# Patient Record
Sex: MALE | Race: WHITE | NOT HISPANIC OR LATINO | Employment: OTHER | URBAN - METROPOLITAN AREA
[De-identification: names, ages, dates, MRNs, and addresses within clinical notes are randomized per-mention and may not be internally consistent; named-entity substitution may affect disease eponyms.]

---

## 2017-07-26 ENCOUNTER — TELEPHONE (OUTPATIENT)
Dept: CARDIOLOGY | Facility: CLINIC | Age: 57
End: 2017-07-26

## 2017-07-26 NOTE — TELEPHONE ENCOUNTER
I called the patient to move appt to [facility]. [facility] for him to call back at [facility] in am .

## 2017-08-09 ENCOUNTER — OFFICE VISIT (OUTPATIENT)
Dept: CARDIOLOGY | Facility: CLINIC | Age: 57
End: 2017-08-09
Payer: COMMERCIAL

## 2017-08-09 VITALS
OXYGEN SATURATION: 96 % | SYSTOLIC BLOOD PRESSURE: 184 MMHG | HEIGHT: 69 IN | DIASTOLIC BLOOD PRESSURE: 82 MMHG | BODY MASS INDEX: 40.17 KG/M2 | HEART RATE: 119 BPM | WEIGHT: 271.19 LBS

## 2017-08-09 DIAGNOSIS — E11.9 TYPE 2 DIABETES MELLITUS WITHOUT COMPLICATION, WITHOUT LONG-TERM CURRENT USE OF INSULIN: ICD-10-CM

## 2017-08-09 DIAGNOSIS — E66.01 SEVERE OBESITY (BMI >= 40): ICD-10-CM

## 2017-08-09 DIAGNOSIS — I87.2 VENOUS INSUFFICIENCY: ICD-10-CM

## 2017-08-09 DIAGNOSIS — I11.9 HYPERTENSIVE HEART DISEASE WITHOUT HEART FAILURE: ICD-10-CM

## 2017-08-09 DIAGNOSIS — E78.00 HYPERCHOLESTEROLEMIA: ICD-10-CM

## 2017-08-09 DIAGNOSIS — R00.0 TACHYCARDIA: Primary | ICD-10-CM

## 2017-08-09 PROCEDURE — 4010F ACE/ARB THERAPY RXD/TAKEN: CPT | Mod: S$GLB,,, | Performed by: INTERNAL MEDICINE

## 2017-08-09 PROCEDURE — 93000 ELECTROCARDIOGRAM COMPLETE: CPT | Mod: S$GLB,,, | Performed by: INTERNAL MEDICINE

## 2017-08-09 PROCEDURE — 3008F BODY MASS INDEX DOCD: CPT | Mod: S$GLB,,, | Performed by: INTERNAL MEDICINE

## 2017-08-09 PROCEDURE — 99214 OFFICE O/P EST MOD 30 MIN: CPT | Mod: S$GLB,,, | Performed by: INTERNAL MEDICINE

## 2017-08-09 RX ORDER — HYDRALAZINE HYDROCHLORIDE 100 MG/1
100 TABLET, FILM COATED ORAL 2 TIMES DAILY
Qty: 180 TABLET | Refills: 1 | Status: SHIPPED | OUTPATIENT
Start: 2017-08-09 | End: 2019-08-19 | Stop reason: SDUPTHER

## 2017-08-09 RX ORDER — METOPROLOL SUCCINATE 200 MG/1
200 TABLET, EXTENDED RELEASE ORAL DAILY
Qty: 90 TABLET | Refills: 1 | Status: SHIPPED | OUTPATIENT
Start: 2017-08-09 | End: 2019-09-19 | Stop reason: SDUPTHER

## 2017-08-09 RX ORDER — PRAVASTATIN SODIUM 40 MG/1
40 TABLET ORAL DAILY
Qty: 90 TABLET | Refills: 1 | Status: SHIPPED | OUTPATIENT
Start: 2017-08-09 | End: 2019-11-14 | Stop reason: SDUPTHER

## 2017-08-09 RX ORDER — HYDROCHLOROTHIAZIDE 25 MG/1
25 TABLET ORAL
COMMUNITY
End: 2017-08-09 | Stop reason: SDUPTHER

## 2017-08-09 RX ORDER — METFORMIN HYDROCHLORIDE 1000 MG/1
1000 TABLET ORAL 2 TIMES DAILY WITH MEALS
COMMUNITY
End: 2019-11-29 | Stop reason: SDUPTHER

## 2017-08-09 RX ORDER — POTASSIUM CHLORIDE 20 MEQ/1
20 TABLET, EXTENDED RELEASE ORAL
COMMUNITY
End: 2018-02-27 | Stop reason: SDUPTHER

## 2017-08-09 RX ORDER — LISINOPRIL 40 MG/1
40 TABLET ORAL DAILY
Qty: 90 TABLET | Refills: 1 | Status: SHIPPED | OUTPATIENT
Start: 2017-08-09 | End: 2019-09-16 | Stop reason: SDUPTHER

## 2017-08-09 RX ORDER — HYDROCHLOROTHIAZIDE 25 MG/1
25 TABLET ORAL DAILY
Qty: 90 TABLET | Refills: 1 | Status: SHIPPED | OUTPATIENT
Start: 2017-08-09 | End: 2019-10-07 | Stop reason: SDUPTHER

## 2017-08-09 RX ORDER — AMLODIPINE BESYLATE 10 MG/1
10 TABLET ORAL DAILY
Qty: 90 TABLET | Refills: 1 | Status: SHIPPED | OUTPATIENT
Start: 2017-08-09 | End: 2019-10-07 | Stop reason: ALTCHOICE

## 2017-08-09 RX ORDER — ASPIRIN 81 MG/1
81 TABLET ORAL NIGHTLY
COMMUNITY

## 2017-08-09 RX ORDER — LISINOPRIL 40 MG/1
40 TABLET ORAL DAILY
Refills: 2 | COMMUNITY
Start: 2017-07-14 | End: 2017-08-09 | Stop reason: SDUPTHER

## 2017-08-09 RX ORDER — DOXAZOSIN 2 MG/1
2 TABLET ORAL NIGHTLY
Qty: 90 TABLET | Refills: 1 | Status: SHIPPED | OUTPATIENT
Start: 2017-08-09 | End: 2019-08-19 | Stop reason: DRUGHIGH

## 2017-08-09 RX ORDER — METOPROLOL SUCCINATE 100 MG/1
100 TABLET, EXTENDED RELEASE ORAL DAILY
Refills: 2 | COMMUNITY
Start: 2017-07-14 | End: 2017-08-09 | Stop reason: DRUGHIGH

## 2017-08-09 RX ORDER — DOXAZOSIN 2 MG/1
2 TABLET ORAL NIGHTLY
COMMUNITY
End: 2017-08-09 | Stop reason: SDUPTHER

## 2017-08-09 RX ORDER — HYDRALAZINE HYDROCHLORIDE 100 MG/1
100 TABLET, FILM COATED ORAL 2 TIMES DAILY
COMMUNITY
End: 2017-08-09 | Stop reason: SDUPTHER

## 2017-08-09 RX ORDER — AMLODIPINE BESYLATE 10 MG/1
10 TABLET ORAL DAILY
COMMUNITY
End: 2017-08-09 | Stop reason: SDUPTHER

## 2017-08-09 RX ORDER — PANTOPRAZOLE SODIUM 40 MG/1
40 TABLET, DELAYED RELEASE ORAL DAILY
COMMUNITY
End: 2019-12-16 | Stop reason: CLARIF

## 2017-08-09 RX ORDER — PRAVASTATIN SODIUM 40 MG/1
40 TABLET ORAL DAILY
COMMUNITY
End: 2017-08-09 | Stop reason: SDUPTHER

## 2017-08-09 NOTE — PATIENT INSTRUCTIONS
About Arrhythmias    Electrical impulses cause the normal heart to beat 60 to 100 times a minute while at rest. These impulses come from a natural pacemaker deep inside the heart muscle. Each impulse causes the heart muscle to contract. This causes the blood to flow through the heart and out to the tissues and organs of your body.  An arrhythmia is a change from the normal speed or pattern of these electrical impulses. This can cause the heart to beat too fast (tachycardia); or too slow (bradycardia); or in an unsteady pattern (irregular rhythm).  Symptoms of arrhythmias  Different people experience arrhythmias differently. Sometimes they may not have symptoms, but just notice a change in their pulse. Symptoms can include:  · Fluttering feeling in the chest  · Shortness of breath  · Chest pain or pressure  · Neck fullness  · Lightheadedness or dizziness  · Fainting or almost fainting  · Palpitations (the sense that your heart is fluttering or beating fast or hard or irregularly)  · Tiredness, fatigue, or weakness  · Cardiac arrest  Causes of arrhythmias  Arrhythmias are most often due to heart disease such as:  · Coronary artery disease  · Heart valve disease  · Enlarged heart  · High blood pressure  · Heart failure  Other causes of  arrhythmia include:  · Certain medicines (such as asthma inhalers and decongestants)  · Some herbal supplements  · Cardiac stimulant drugs (such as cocaine, amphetamine, diet pills, certain decongestant cold medicines, caffeine, and nicotine)  · Excessive alcohol use  · Anxiety and panic disorder  · Thyroid disease  · Anemia  · Diabetes  · Sleep apnea  · Obesity  · Congenital heart disease  · Cardiac genetic diseases  Arrhythmias can often be prevented. The cause and type of arrhythmia determines the best treatment. Sometimes your doctor may want to monitor your heart rate over a 24-hour period or longer. This can help identify the cause of your arrhythmia and find the best treatment.  This can be done with a Holter monitor, a portable EKG recording device attached by wires to your chest. Or you may get an event monitor, which you can place over the skin in front of your heart to record heart rhythms. You can carry this with you as you go about your routine activities during the monitoring period. Implantable loop recorders may also be used to monitor the heart rhythm for up to 2 years. This miniature device is placed underneath the skin overlying the heart.  Home care  The following guidelines will help you care for yourself at home:  · Avoid cardiac stimulants (such as cocaine, amphetamine, diet pills, certain decongestant cold medicines, caffeine, and nicotine).  · If you smoke, stop smoking. Contact your doctor or a local stop-smoking program for help.  · Tell your doctor about any prescription, over-the-counter, or herbal medicines you take. These may be affecting your heart rhythm.  Follow-up care  Follow up with your healthcare provider, or as advised. If a Holter monitor has been recommended, contact the cardiologist you have been referred to as soon as you can  the device. Other outpatient tests may also be arranged for you at that time.  Call 911  This is the fastest and safest way to get to the emergency department. The paramedics can also start treatment on the way to the hospital, if needed.  Don't wait until your symptoms are severe to call 911. Other reasons to call 911 besides chest pain include:  · Chest, shoulder, arm, neck, or back pain  · Shortness of breath  · Feeling lightheaded, faint, or dizzy  · Unexplained fainting  · Rapid heart beat  · Slower than usual heart rate compared to your normal  · Very irregular heartbeat  · Chest pain (angina) with weakness, dizziness, heavy sweating, nausea, or vomiting  · Extreme drowsiness, or confusion  · Weakness of an arm or leg or one side of the face  · Difficulty with speech or vision  When to seek medical advice  Remember,  "things are not always like they are on TV. Sometimes it is not so obvious. You may only feel weak or just "not right." If it is not clear or if you have any doubt, call for advice.  · Seek help for chest pain, or it feels different from usual, even if your symptoms are mild.  · Don't drive yourself. Have someone else drive. If no one can drive you, call 911.  · If your doctor has given you medicines to take when you have symptoms, take them, but do not delay getting help while trying to find them.  Date Last Reviewed: 4/25/2016 © 2000-2016 [employer]. [address] [place] [address], [place] [address]. All rights reserved. This information is not intended as a substitute for professional medical care. Always follow your healthcare professional's instructions.        Weight Management: Getting Started  Healthy bodies come in all shapes and sizes. Not all bodies are made to be thin. For some people, a healthy weight is higher than the average weight listed on weight charts. Your healthcare provider can help you decide on a healthy weight for you.    Reasons to lose weight  Losing weight can help with some health problems, such as high blood pressure, heart disease, diabetes, sleep apnea, and arthritis. You may also feel more energy.  Set your long-term goal  Your goal doesn't even have to be a specific weight. You may decide on a fitness goal (such as being able to walk 10 miles a week), or a health goal (such as lowering your blood pressure). Choose a goal that is measurable and reasonable, so you know when you've reached it. A goal of reaching a BMI of less than 25 is not always reasonable (or possible).   Make an action plan  Habits dont change overnight. Setting your goals too high can leave you feeling discouraged if you cant reach them. Be realistic. Choose one or two small changes you can make now. Set an action plan for how you are going to make these changes. When you can stick to this plan, keep " making a few more small changes. Taking small steps will help you stay on the path to success.  Track your progress  Write down your goals. Then, keep a daily record of your progress. Write down what you eat and how active you are. This record lets you look back on how much youve done. It may also help when youre feeling frustrated. Reward yourself for success. Even if you dont reach every goal, give yourself credit for what you do get done.  Get support  Encouragement from others can help make losing weight easier. Ask your family members and friends for support. They may even want to join you. Also look to your healthcare provider, registered dietitian, and  for help. Your local hospital can give you more information about nutrition, exercise, and weight loss.  Date Last Reviewed: 1/31/2016  © 0196-4366 [employer]. [address] [place] [address]. All rights reserved. This information is not intended as a substitute for professional medical care. Always follow your healthcare professional's instructions.        Established High Blood Pressure    High blood pressure (hypertension) is a chronic disease. Often health care providers dont know what causes it. But it can be caused by certain health conditions and medicines.  If you have high blood pressure, you may not have any symptoms. If you do have symptoms, they may include headache, dizziness, changes in your vision, chest pain, and shortness of breath. But even without symptoms, high blood pressure thats not treated raises your risk for heart attack and stroke. High blood pressure is a serious health risk and shouldnt be ignored.  A blood pressure reading is made up of two numbers: a higher number over a lower number. The top number is the systolic pressure. The bottom number is the diastolic pressure. A normal blood pressure is less than 120 over less than 80.  High blood pressure is when either the top number  is 140 or higher, or the bottom number is 90 or higher. This must be the result when taking your blood pressure a number of times. The blood pressures between normal and high are called prehypertension.  Home care  If you have high blood pressure, you should do what is listed below to lower your blood pressure. If you are taking medicines for high blood pressure, these methods may reduce or end your need for medicines in the future.  · Begin a weight-loss program if you are overweight.  · Cut back on how much salt you get in your diet. Heres how to do this:  ¨ Dont eat foods that have a lot of salt. These include olives, pickles, smoked meats, and salted potato chips.  ¨ Dont add salt to your food at the table.  ¨ Use only small amounts of salt when cooking.  · Begin an exercise program. Talk with your health care provider about the type of exercise program that would be best for you. It doesn't have to be hard. Even brisk walking for 20 minutes 3 times a week is a good form of exercise.  · Dont take medicines that have heart stimulants. This includes many cold and sinus decongestant pills and sprays, as well as diet pills. Check the warnings about hypertension on the label. Stimulants such as amphetamine or cocaine could be lethal for someone with high blood pressure. Never take these.  · Limit how much caffeine you get in your diet. Switch to caffeine-free products.  · Stop smoking. If you are a long-time smoker, this can be hard. Enroll in a stop-smoking program to make it more likely that you will quit for good.  · Learn how to handle stress. This is an important part of any program to lower blood pressure. Learn about relaxation methods like meditation, yoga, or biofeedback.  · If your provider prescribed medicines, take them exactly as directed. Missing doses may cause your blood pressure get out of control.  · Consider buying an automatic blood pressure machine. You can get one of these at most  pharmacies. Use this to watch your blood pressure at home. Give the results to your provider.  Follow-up care  You will need to make regular visits to your health care provider. This is to check your blood pressure and to make changes to your medicines. Make a follow-up appointment as directed.  When to seek medical advice  Call your health care provider right away if any of these occur:  · Chest pain or shortness of breath  · Severe headache  · Throbbing or rushing sound in the ears  · Nosebleed  · Sudden severe pain in your belly (abdomen)  · Extreme drowsiness, confusion, or fainting  · Dizziness or dizziness with a spinning sensation (vertigo)  · Weakness of an arm or leg or one side of the face  · You have problems speaking or seeing   Date Last Reviewed: 11/25/2014  © 3330-5612 [employer]. [address] [place] [address], [place] [address]. All rights reserved. This information is not intended as a substitute for professional medical care. Always follow your healthcare professional's instructions.

## 2017-08-09 NOTE — PROGRESS NOTES
Subjective:    Patient ID:  Rashel Munson III is a 57 y.o. male who presents for Hypertension; Peripheral Vascular Disease; and Hyperlipidemia      HPIPT WAS FOLLOWED AT [facility], NEW TO THIS CLINIC, BP FLUCTUATES,HAS BEEN TIRED WITH 2 JOBS, STATES COMPLIANT WITH MEDS,  SEE ROS    Past Medical History:   Diagnosis Date    Angina pectoris     Coronary artery disease     Diabetes mellitus     Hyperlipidemia     Hypertension     Mitral valve disorder     PVD (peripheral vascular disease)     Sleep apnea      Past Surgical History:   Procedure Laterality Date    CARDIAC CATHETERIZATION  2011    COLONOSCOPY W/ POLYPECTOMY       History reviewed. No pertinent family history.  Social History     Social History    Marital status:      Spouse name: N/A    Number of children: N/A    Years of education: N/A     Social History Main Topics    Smoking status: Never Smoker    Smokeless tobacco: Never Used    Alcohol use No    Drug use: No    Sexual activity: Not Asked     Other Topics Concern    None     Social History Narrative    None       Review of patient's allergies indicates:  No Known Allergies    Current Outpatient Prescriptions:     amlodipine (NORVASC) 10 MG tablet, Take 1 tablet (10 mg total) by mouth once daily., Disp: 90 tablet, Rfl: 1    aspirin (ECOTRIN) 325 MG EC tablet, Take 325 mg by mouth once daily., Disp: , Rfl:     hydrALAZINE (APRESOLINE) 100 MG tablet, Take 1 tablet (100 mg total) by mouth 2 (two) times daily., Disp: 180 tablet, Rfl: 1    hydrochlorothiazide (HYDRODIURIL) 25 MG tablet, Take 1 tablet (25 mg total) by mouth once daily. Four times a week, Disp: 90 tablet, Rfl: 1    lisinopril (PRINIVIL,ZESTRIL) 40 MG tablet, Take 1 tablet (40 mg total) by mouth once daily., Disp: 90 tablet, Rfl: 1    metformin (GLUCOPHAGE) 1000 MG tablet, Take 1,000 mg by mouth 2 (two) times daily with meals., Disp: , Rfl:     pantoprazole (PROTONIX) 40 MG tablet, Take 40 mg by mouth once  daily., Disp: , Rfl:     potassium chloride SA (K-DUR,KLOR-CON) 20 MEQ tablet, Take 20 mEq by mouth. Four times a week, Disp: , Rfl:     pravastatin (PRAVACHOL) 40 MG tablet, Take 1 tablet (40 mg total) by mouth once daily., Disp: 90 tablet, Rfl: 1    doxazosin (CARDURA) 2 MG tablet, Take 1 tablet (2 mg total) by mouth every evening., Disp: 90 tablet, Rfl: 1    metoprolol succinate (TOPROL-XL) 200 MG 24 hr tablet, Take 1 tablet (200 mg total) by mouth once daily., Disp: 90 tablet, Rfl: 1    Review of Systems   Constitution: Negative for chills, diaphoresis, fever, weakness and night sweats.   HENT: Negative for congestion, hearing loss and nosebleeds.    Eyes: Negative for blurred vision, discharge, double vision and visual disturbance.   Cardiovascular: Negative for chest pain, claudication, cyanosis, dyspnea on exertion (MINIMAL,WEIGHT), irregular heartbeat, leg swelling (MILD, BETTER), near-syncope, orthopnea, palpitations, paroxysmal nocturnal dyspnea and syncope.   Respiratory: Negative for cough, hemoptysis, shortness of breath, sleep disturbances due to breathing, sputum production and wheezing.         USES CPAP   Endocrine: Negative for cold intolerance, heat intolerance and polyuria.   Hematologic/Lymphatic: Negative for adenopathy and bleeding problem. Does not bruise/bleed easily.   Skin: Negative for color change, itching and nail changes.   Musculoskeletal: Positive for arthritis. Negative for back pain and falls.   Gastrointestinal: Negative for abdominal pain, change in bowel habit, dysphagia, heartburn, hematemesis, jaundice, melena and vomiting.   Genitourinary: Negative for dysuria, flank pain and frequency.   Neurological: Negative for brief paralysis, difficulty with concentration, disturbances in coordination, dizziness, focal weakness, light-headedness, loss of balance, numbness and tremors.   Psychiatric/Behavioral: Negative for altered mental status, depression, memory loss and substance  "abuse. The patient is not nervous/anxious.    Allergic/Immunologic: Negative for hives and persistent infections.        Objective:      Vitals:    08/09/17 1544   BP: (!) 184/82   Pulse: (!) 119   SpO2: 96%   Weight: 123 kg (271 lb 2.7 oz)   Height: 5' 9" (1.753 m)   PainSc: 0-No pain     Body mass index is 40.04 kg/m².    Physical Exam   Constitutional: He is oriented to person, place, and time. He appears well-developed and well-nourished. He is active.   OBESE   HENT:   Head: Normocephalic and atraumatic.   Mouth/Throat: Oropharynx is clear and moist and mucous membranes are normal.   Eyes: Conjunctivae and EOM are normal. Pupils are equal, round, and reactive to light.   Neck: Normal range of motion. Neck supple. Normal carotid pulses, no hepatojugular reflux and no JVD present. Carotid bruit is not present. No tracheal deviation, no edema and no erythema present. No thyromegaly present.   Cardiovascular: Regular rhythm.   No extrasystoles are present. Tachycardia present.  PMI is not displaced.  Exam reveals no gallop, no distant heart sounds, no friction rub and no midsystolic click.    Murmur heard.   Systolic murmur is present with a grade of 1/6  at the lower left sternal border  Pulses:       Carotid pulses are 2+ on the right side, and 2+ on the left side.       Radial pulses are 2+ on the right side, and 2+ on the left side.        Femoral pulses are 2+ on the right side, and 2+ on the left side.       Dorsalis pedis pulses are 2+ on the right side, and 2+ on the left side.        Posterior tibial pulses are 2+ on the right side, and 2+ on the left side.   Pulmonary/Chest: Effort normal and breath sounds normal. No accessory muscle usage. No tachypnea and no bradypnea. No respiratory distress.   Abdominal: Soft. Bowel sounds are normal. He exhibits no distension and no mass. There is no hepatosplenomegaly. There is no tenderness. There is no CVA tenderness.   Musculoskeletal: Normal range of motion. He " exhibits no edema or deformity.   Lymphadenopathy:     He has no cervical adenopathy.   Neurological: He is alert and oriented to person, place, and time. He has normal strength. He displays no tremor. No cranial nerve deficit. He exhibits normal muscle tone. Coordination normal.   Skin: Skin is warm and dry. No cyanosis or erythema. No pallor.   Psychiatric: He has a normal mood and affect. His speech is normal and behavior is normal. Judgment and thought content normal.               ..    Chemistry    No results found for: NA, K, CL, CO2, BUN, CREATININE, GLU No results found for: CALCIUM, ALKPHOS, AST, ALT, BILITOT, ESTGFRAFRICA, EGFRNONAA         ..No results found for: CHOL  No results found for: HDL  No results found for: LDLCALC  No results found for: TRIG  No results found for: CHOLHDL  ..No results found for: WBC, HGB, HCT, MCV, PLT    Test(s) Reviewed  I have reviewed the following in detail:  [] Stress test   [] Angiography   [] Echocardiogram   [] Labs   [x] Other:       Assessment:         ICD-10-CM ICD-9-CM   1. Tachycardia R00.0 785.0   2. Hypertensive heart disease without heart failure I11.9 402.90   3. Venous insufficiency I87.2 459.81   4. Severe obesity (BMI >= 40) E66.01 278.01   5. Type 2 diabetes mellitus without complication, without long-term current use of insulin E11.9 250.00   6. Hypercholesterolemia E78.00 272.0     Problem List Items Addressed This Visit        Cardiac/Vascular    Tachycardia - Primary    Relevant Orders    EKG 12-lead    Comprehensive metabolic panel    Hemoglobin    TSH    Microalbumin, Timed Urine    VMA, urine    Cortisol, urine, free    5 HIAA, quantitative, Urine    Hypertensive heart disease without heart failure    Relevant Orders    Comprehensive metabolic panel    Microalbumin, Timed Urine    VMA, urine    Cortisol, urine, free    5 HIAA, quantitative, Urine    Venous insufficiency       Endocrine    Severe obesity (BMI >= 40)    Type 2 diabetes mellitus  without complication, without long-term current use of insulin    Relevant Orders    Ambulatory Referral to Internal Medicine    Comprehensive metabolic panel    Hemoglobin A1c      Other Visit Diagnoses     Hypercholesterolemia        Relevant Orders    Comprehensive metabolic panel    Lipid panel           Plan:     EKG ST, LAE, NS T CHANGES, WILL NEED W/U FOR HTN, TACHYCARDIA, INCREASE TOPROL  MG, ALL OTHER CV CLINICALLY STABLE, NO ANGINA, NO HF, NO TIA, NO CLINICAL ARRHYTHMIA,CONTINUE CURRENT MEDS, EDUCATION, DIET, EXERCISE, WEIGHT LOSS, LABS SOON, RTC IN 2 MO, REFER TO IM FOR DM/PCP      Tachycardia  -     EKG 12-lead  -     Comprehensive metabolic panel; Future; Expected date: 08/09/2017  -     Hemoglobin; Future; Expected date: 08/09/2017  -     TSH; Future; Expected date: 08/09/2017  -     Microalbumin, Timed Urine; Future  -     VMA, urine; Future  -     Cortisol, urine, free; Future  -     5 HIAA, quantitative, Urine; Future    Hypertensive heart disease without heart failure  -     Comprehensive metabolic panel; Future; Expected date: 08/09/2017  -     Microalbumin, Timed Urine; Future  -     VMA, urine; Future  -     Cortisol, urine, free; Future  -     5 HIAA, quantitative, Urine; Future    Venous insufficiency    Severe obesity (BMI >= 40)    Type 2 diabetes mellitus without complication, without long-term current use of insulin  -     Ambulatory Referral to Internal Medicine  -     Comprehensive metabolic panel; Future; Expected date: 08/09/2017  -     Hemoglobin A1c; Future; Expected date: 08/09/2017    Hypercholesterolemia  -     Comprehensive metabolic panel; Future; Expected date: 08/09/2017  -     Lipid panel; Future; Expected date: 08/09/2017    Other orders  -     metoprolol succinate (TOPROL-XL) 200 MG 24 hr tablet; Take 1 tablet (200 mg total) by mouth once daily.  Dispense: 90 tablet; Refill: 1  -     pravastatin (PRAVACHOL) 40 MG tablet; Take 1 tablet (40 mg total) by mouth once  daily.  Dispense: 90 tablet; Refill: 1  -     lisinopril (PRINIVIL,ZESTRIL) 40 MG tablet; Take 1 tablet (40 mg total) by mouth once daily.  Dispense: 90 tablet; Refill: 1  -     hydrALAZINE (APRESOLINE) 100 MG tablet; Take 1 tablet (100 mg total) by mouth 2 (two) times daily.  Dispense: 180 tablet; Refill: 1  -     hydrochlorothiazide (HYDRODIURIL) 25 MG tablet; Take 1 tablet (25 mg total) by mouth once daily. Four times a week  Dispense: 90 tablet; Refill: 1  -     doxazosin (CARDURA) 2 MG tablet; Take 1 tablet (2 mg total) by mouth every evening.  Dispense: 90 tablet; Refill: 1  -     amlodipine (NORVASC) 10 MG tablet; Take 1 tablet (10 mg total) by mouth once daily.  Dispense: 90 tablet; Refill: 1    RTC Low level/low impact aerobic exercise 5x's/wk. Heart healthy diet and risk factor modification.    See labs and med orders.    Aerobic exercise 5x's/wk. Heart healthy diet and risk factor modification.    See labs and med orders.

## 2018-02-28 RX ORDER — POTASSIUM CHLORIDE 20 MEQ/1
TABLET, EXTENDED RELEASE ORAL
Qty: 90 TABLET | Refills: 1 | Status: SHIPPED | OUTPATIENT
Start: 2018-02-28 | End: 2019-10-07 | Stop reason: SDUPTHER

## 2018-09-28 RX ORDER — LISINOPRIL 40 MG/1
TABLET ORAL
Qty: 90 TABLET | Refills: 1 | OUTPATIENT
Start: 2018-09-28

## 2019-08-19 ENCOUNTER — NURSE TRIAGE (OUTPATIENT)
Dept: ADMINISTRATIVE | Facility: CLINIC | Age: 59
End: 2019-08-19

## 2019-08-19 ENCOUNTER — OFFICE VISIT (OUTPATIENT)
Dept: CARDIOLOGY | Facility: CLINIC | Age: 59
End: 2019-08-19
Payer: COMMERCIAL

## 2019-08-19 VITALS
SYSTOLIC BLOOD PRESSURE: 184 MMHG | HEART RATE: 106 BPM | DIASTOLIC BLOOD PRESSURE: 86 MMHG | WEIGHT: 276.44 LBS | HEIGHT: 69 IN | BODY MASS INDEX: 40.94 KG/M2

## 2019-08-19 DIAGNOSIS — I10 UNCONTROLLED HYPERTENSION: Primary | ICD-10-CM

## 2019-08-19 DIAGNOSIS — R06.02 SOB (SHORTNESS OF BREATH): ICD-10-CM

## 2019-08-19 DIAGNOSIS — R94.31 NONSPECIFIC ABNORMAL ELECTROCARDIOGRAM (ECG) (EKG): ICD-10-CM

## 2019-08-19 DIAGNOSIS — E66.01 SEVERE OBESITY (BMI >= 40): ICD-10-CM

## 2019-08-19 DIAGNOSIS — E78.00 HYPERCHOLESTEROLEMIA: ICD-10-CM

## 2019-08-19 PROCEDURE — 99999 PR PBB SHADOW E&M-EST. PATIENT-LVL III: ICD-10-PCS | Mod: PBBFAC,,, | Performed by: INTERNAL MEDICINE

## 2019-08-19 PROCEDURE — 99214 PR OFFICE/OUTPT VISIT, EST, LEVL IV, 30-39 MIN: ICD-10-PCS | Mod: S$GLB,,, | Performed by: INTERNAL MEDICINE

## 2019-08-19 PROCEDURE — 99999 PR PBB SHADOW E&M-EST. PATIENT-LVL III: CPT | Mod: PBBFAC,,, | Performed by: INTERNAL MEDICINE

## 2019-08-19 PROCEDURE — 3008F PR BODY MASS INDEX (BMI) DOCUMENTED: ICD-10-PCS | Mod: CPTII,S$GLB,, | Performed by: INTERNAL MEDICINE

## 2019-08-19 PROCEDURE — 3008F BODY MASS INDEX DOCD: CPT | Mod: CPTII,S$GLB,, | Performed by: INTERNAL MEDICINE

## 2019-08-19 PROCEDURE — 99214 OFFICE O/P EST MOD 30 MIN: CPT | Mod: S$GLB,,, | Performed by: INTERNAL MEDICINE

## 2019-08-19 RX ORDER — DOXAZOSIN 4 MG/1
4 TABLET ORAL NIGHTLY
Qty: 90 TABLET | Refills: 3 | Status: SHIPPED | OUTPATIENT
Start: 2019-08-19 | End: 2019-09-16 | Stop reason: DRUGHIGH

## 2019-08-19 RX ORDER — NITROGLYCERIN 0.4 MG/1
0.4 TABLET SUBLINGUAL EVERY 5 MIN PRN
Qty: 25 TABLET | Refills: 0 | Status: SHIPPED | OUTPATIENT
Start: 2019-08-19 | End: 2022-03-09 | Stop reason: SDUPTHER

## 2019-08-19 RX ORDER — HYDRALAZINE HYDROCHLORIDE 100 MG/1
100 TABLET, FILM COATED ORAL 2 TIMES DAILY
Qty: 180 TABLET | Refills: 1 | Status: SHIPPED | OUTPATIENT
Start: 2019-08-19 | End: 2019-11-14 | Stop reason: SDUPTHER

## 2019-08-19 RX ORDER — DOXAZOSIN 4 MG/1
4 TABLET ORAL NIGHTLY
Qty: 30 TABLET | Refills: 0 | Status: SHIPPED | OUTPATIENT
Start: 2019-08-19 | End: 2019-08-19 | Stop reason: SDUPTHER

## 2019-08-19 NOTE — PROGRESS NOTES
Subjective:    Patient ID:  Rashel Munson III is a 59 y.o. male who presents for Hypertension        HPI  NO F/U SINCE 8/2017, BP WAS RELATIVELY OK, COLD LAST WEEK, ON CORICIDIN HBP, BP UP,FOLLOWED AT [facility] IN [place], RECENT CMP OK, ON ER HICCUP, THEN EGD PER DR CAUSEY WAS OK, HAS BEEN OUT OF [facility], STATES IN GENERAL HE IS COMPLIANT WITH MEDICATION HOWEVER NOT WITH DIET, SEE ROS    Past Medical History:   Diagnosis Date    Angina pectoris     Coronary artery disease     Diabetes mellitus     Hyperlipidemia     Hypertension     Mitral valve disorder     PVD (peripheral vascular disease)     Sleep apnea      Past Surgical History:   Procedure Laterality Date    CARDIAC CATHETERIZATION  2011    COLONOSCOPY W/ POLYPECTOMY       No family history on file.  Social History     Socioeconomic History    Marital status:      Spouse name: Not on file    Number of children: Not on file    Years of education: Not on file    Highest education level: Not on file   Occupational History    Not on file   Social Needs    Financial resource strain: Not on file    Food insecurity:     Worry: Not on file     Inability: Not on file    Transportation needs:     Medical: Not on file     Non-medical: Not on file   Tobacco Use    Smoking status: Never Smoker    Smokeless tobacco: Never Used   Substance and Sexual Activity    Alcohol use: No    Drug use: No    Sexual activity: Not on file   Lifestyle    Physical activity:     Days per week: Not on file     Minutes per session: Not on file    Stress: Not on file   Relationships    Social connections:     Talks on phone: Not on file     Gets together: Not on file     Attends Judaism service: Not on file     Active member of club or organization: Not on file     Attends meetings of clubs or organizations: Not on file     Relationship status: Not on file   Other Topics Concern    Not on file   Social History Narrative    Not on file        Review of patient's allergies indicates:  No Known Allergies    Current Outpatient Medications:     amlodipine (NORVASC) 10 MG tablet, Take 1 tablet (10 mg total) by mouth once daily., Disp: 90 tablet, Rfl: 1    aspirin (ECOTRIN) 325 MG EC tablet, Take 325 mg by mouth once daily., Disp: , Rfl:     doxazosin (CARDURA) 4 MG tablet, Take 1 tablet (4 mg total) by mouth every evening., Disp: 90 tablet, Rfl: 3    glyBURIDE (DIABETA) 2.5 MG tablet, Take 1 tablet (2.5 mg total) by mouth daily with breakfast., Disp: 90 tablet, Rfl: 3    hydrALAZINE (APRESOLINE) 100 MG tablet, Take 1 tablet (100 mg total) by mouth 2 (two) times daily., Disp: 180 tablet, Rfl: 1    hydrochlorothiazide (HYDRODIURIL) 25 MG tablet, Take 1 tablet (25 mg total) by mouth once daily. Four times a week, Disp: 90 tablet, Rfl: 1    ibuprofen (ADVIL,MOTRIN) 600 MG tablet, Take 1 tablet (600 mg total) by mouth 3 (three) times daily., Disp: 60 tablet, Rfl: 1    lisinopril (PRINIVIL,ZESTRIL) 40 MG tablet, Take 1 tablet (40 mg total) by mouth once daily., Disp: 90 tablet, Rfl: 1    metformin (GLUCOPHAGE) 1000 MG tablet, Take 1,000 mg by mouth 2 (two) times daily with meals., Disp: , Rfl:     metoprolol succinate (TOPROL-XL) 200 MG 24 hr tablet, Take 1 tablet (200 mg total) by mouth once daily., Disp: 90 tablet, Rfl: 1    nitroGLYCERIN (NITROSTAT) 0.4 MG SL tablet, Place 1 tablet (0.4 mg total) under the tongue every 5 (five) minutes as needed., Disp: 25 tablet, Rfl: 0    pantoprazole (PROTONIX) 40 MG tablet, Take 40 mg by mouth once daily., Disp: , Rfl:     potassium chloride SA (K-DUR,KLOR-CON) 20 MEQ tablet, TAKE 1 TABLET DAILY WITH FOOD, Disp: 90 tablet, Rfl: 1    pravastatin (PRAVACHOL) 40 MG tablet, Take 1 tablet (40 mg total) by mouth once daily., Disp: 90 tablet, Rfl: 1    Review of Systems   Constitution: Negative for chills, diaphoresis, fever, malaise/fatigue and night sweats.   HENT: Positive for congestion. Negative for hearing  "loss and nosebleeds.    Eyes: Negative for blurred vision and visual disturbance.   Cardiovascular: Positive for dyspnea on exertion (MILD). Negative for chest pain, claudication, cyanosis, irregular heartbeat, leg swelling (MILD), near-syncope, orthopnea, palpitations, paroxysmal nocturnal dyspnea and syncope.   Respiratory: Negative for cough, hemoptysis, shortness of breath and wheezing.    Endocrine: Negative for cold intolerance, heat intolerance and polyuria.        BG FLUCTUAUES   Hematologic/Lymphatic: Negative for adenopathy. Does not bruise/bleed easily.   Skin: Negative for color change, itching, nail changes and rash.   Musculoskeletal: Negative for back pain, falls and joint pain (KNEES).   Gastrointestinal: Negative for abdominal pain, change in bowel habit, dysphagia, heartburn, hematemesis, jaundice, melena and vomiting.   Genitourinary: Negative for dysuria, flank pain and frequency.   Neurological: Positive for light-headedness. Negative for brief paralysis, dizziness, focal weakness, loss of balance, numbness, paresthesias, seizures, sensory change, tremors and weakness.   Psychiatric/Behavioral: Negative for altered mental status, depression and memory loss. The patient is not nervous/anxious.    Allergic/Immunologic: Negative for hives and persistent infections.        Objective:      Vitals:    08/19/19 1506   BP: (!) 184/86   Pulse: 106   Weight: 125.4 kg (276 lb 7.3 oz)   Height: 5' 9" (1.753 m)   PainSc: 0-No pain     Body mass index is 40.83 kg/m².    Physical Exam   Constitutional: He is oriented to person, place, and time. He appears well-developed and well-nourished. He is active.   OBESE   HENT:   Head: Normocephalic and atraumatic.   Mouth/Throat: Oropharynx is clear and moist and mucous membranes are normal.   Eyes: Pupils are equal, round, and reactive to light. Conjunctivae and EOM are normal.   Neck: Normal range of motion. Neck supple. Normal carotid pulses, no hepatojugular " reflux and no JVD present. Carotid bruit is not present. No tracheal deviation, no edema and no erythema present. No thyromegaly present.   Cardiovascular: Normal rate, regular rhythm and normal heart sounds.  No extrasystoles are present. PMI is not displaced. Exam reveals no gallop, no distant heart sounds, no friction rub and no midsystolic click.   No murmur heard.  Pulses:       Carotid pulses are 2+ on the right side, and 2+ on the left side.       Radial pulses are 2+ on the right side, and 2+ on the left side.        Femoral pulses are 2+ on the right side, and 2+ on the left side.       Popliteal pulses are 2+ on the right side, and 2+ on the left side.        Dorsalis pedis pulses are 2+ on the right side, and 2+ on the left side.        Posterior tibial pulses are 2+ on the right side, and 2+ on the left side.   Pulmonary/Chest: Effort normal and breath sounds normal. No accessory muscle usage. No tachypnea and no bradypnea. No respiratory distress.   Abdominal: Soft. Bowel sounds are normal. He exhibits no distension and no mass. There is no hepatosplenomegaly. There is no tenderness. There is no CVA tenderness.   Musculoskeletal: Normal range of motion. He exhibits no edema or deformity.   Lymphadenopathy:     He has no cervical adenopathy.   Neurological: He is alert and oriented to person, place, and time. He has normal strength. He displays no tremor. No cranial nerve deficit.   Skin: Skin is warm and dry. No cyanosis or erythema. No pallor.   Psychiatric: He has a normal mood and affect. His speech is normal and behavior is normal.               ..    Chemistry        Component Value Date/Time     05/06/2019 1331    K 3.7 05/06/2019 1331     05/06/2019 1331    CO2 24 05/06/2019 1331    BUN 17 05/06/2019 1331    CREATININE 0.78 05/06/2019 1331     (H) 05/06/2019 1331        Component Value Date/Time    CALCIUM 9.5 05/06/2019 1331    ALKPHOS 80 05/06/2019 1331    AST 21 05/06/2019  1331    ALT 29 05/06/2019 1331    BILITOT 0.4 05/06/2019 1331    ESTGFRAFRICA >60 05/06/2019 1331    EGFRNONAA >60 05/06/2019 1331            ..No results found for: CHOL  No results found for: HDL  No results found for: LDLCALC  No results found for: TRIG  No results found for: CHOLHDL  ..  Lab Results   Component Value Date    WBC 7.87 05/06/2019    HGB 15.2 05/06/2019    HCT 43.9 05/06/2019    MCV 84 05/06/2019     05/06/2019       Test(s) Reviewed  I have reviewed the following in detail:  [] Stress test   [] Angiography   [] Echocardiogram   [x] Labs   [x] Other:       Assessment:         ICD-10-CM ICD-9-CM   1. Uncontrolled hypertension I10 401.9   2. SOB (shortness of breath) R06.02 786.05   3. Nonspecific abnormal electrocardiogram (ECG) (EKG) R94.31 794.31   4. Severe obesity (BMI >= 40) E66.01 278.01   5. Hypercholesterolemia E78.00 272.0     Problem List Items Addressed This Visit        Cardiac/Vascular    Uncontrolled hypertension - Primary    Relevant Orders    Stress test with myocardial perfusion    CV US Renal Artery Stenosis Hypertension Complete    Nonspecific abnormal electrocardiogram (ECG) (EKG)    Hypercholesterolemia    Relevant Orders    Lipid panel       Endocrine    Severe obesity (BMI >= 40)       Other    SOB (shortness of breath)    Relevant Orders    Stress test with myocardial perfusion           Plan:         EKG SR, NS T CHANGES, INCREASE CARDURA TO 4 MG, INCREASED CV RISK, WILL NEED EVALUATION, WILL CHECK NUCLEAR STRESS TEST, RENAL AA US, DIET, WEIGHT LOSS REFER TO PCP, NEEDS FOLLOW-UP ON DIABETES,ALL OTHER CV CLINICALLY RELATIVELY STABLE,  NO HF, NO TIA, NO CLINICAL ARRHYTHMIA,CONTINUE CURRENT MEDS, EDUCATION, DIET, EXERCISE, WEIGHT LOSS, RETURN TO CLINIC IN FEW WEEKS  Uncontrolled hypertension  -     Stress test with myocardial perfusion; Future  -     CV US Renal Artery Stenosis Hypertension Complete; Future    SOB (shortness of breath)  -     Stress test with myocardial  perfusion; Future    Nonspecific abnormal electrocardiogram (ECG) (EKG)    Severe obesity (BMI >= 40)    Hypercholesterolemia  -     Lipid panel; Future; Expected date: 08/19/2019    Other orders  -     Discontinue: doxazosin (CARDURA) 4 MG tablet; Take 1 tablet (4 mg total) by mouth every evening.  Dispense: 30 tablet; Refill: 0  -     nitroGLYCERIN (NITROSTAT) 0.4 MG SL tablet; Place 1 tablet (0.4 mg total) under the tongue every 5 (five) minutes as needed.  Dispense: 25 tablet; Refill: 0  -     doxazosin (CARDURA) 4 MG tablet; Take 1 tablet (4 mg total) by mouth every evening.  Dispense: 90 tablet; Refill: 3  -     hydrALAZINE (APRESOLINE) 100 MG tablet; Take 1 tablet (100 mg total) by mouth 2 (two) times daily.  Dispense: 180 tablet; Refill: 1    RTC Low level/low impact aerobic exercise 5x's/wk. Heart healthy diet and risk factor modification.    See labs and med orders.    Aerobic exercise 5x's/wk. Heart healthy diet and risk factor modification.    See labs and med orders.

## 2019-08-19 NOTE — TELEPHONE ENCOUNTER
Reason for Disposition   Systolic BP >= 180 OR Diastolic >= 110    Additional Information   Negative: Sounds like a life-threatening emergency to the triager   Negative: Pregnant > 20 weeks and new hand or face swelling   Negative: Pregnant > 20 weeks and BP > 140/90   Negative: Systolic BP >= 160 OR Diastolic >= 100, and any cardiac or neurologic symptoms (e.g., chest pain, difficulty breathing, unsteady gait, blurred vision)   Negative: Patient sounds very sick or weak to the triager   Negative: BP Systolic BP >= 140 OR Diastolic >= 90 and postpartum < 4 weeks   Negative: Systolic BP >= 180 OR Diastolic >= 110, and missed most recent dose of blood pressure medication    Protocols used: HIGH BLOOD PRESSURE-A-OH    Pt called stated his blood pressure this am was 181/84 then 174/90. Pt stated his blood pressure has been in the 180's/80's since last week and he has been taking his medications. Pt instructed on care advice and will see Dr Lua today.

## 2019-08-22 ENCOUNTER — INITIAL CONSULT (OUTPATIENT)
Dept: PRIMARY CARE CLINIC | Facility: CLINIC | Age: 59
End: 2019-08-22
Payer: COMMERCIAL

## 2019-08-22 VITALS
SYSTOLIC BLOOD PRESSURE: 160 MMHG | DIASTOLIC BLOOD PRESSURE: 76 MMHG | WEIGHT: 276 LBS | OXYGEN SATURATION: 96 % | HEIGHT: 69 IN | BODY MASS INDEX: 40.88 KG/M2 | HEART RATE: 75 BPM

## 2019-08-22 DIAGNOSIS — I10 UNCONTROLLED HYPERTENSION: ICD-10-CM

## 2019-08-22 DIAGNOSIS — E78.00 HYPERCHOLESTEROLEMIA: ICD-10-CM

## 2019-08-22 DIAGNOSIS — E11.9 TYPE 2 DIABETES MELLITUS WITHOUT COMPLICATION, WITHOUT LONG-TERM CURRENT USE OF INSULIN: Primary | ICD-10-CM

## 2019-08-22 DIAGNOSIS — E66.01 SEVERE OBESITY (BMI >= 40): ICD-10-CM

## 2019-08-22 DIAGNOSIS — I87.2 VENOUS INSUFFICIENCY: ICD-10-CM

## 2019-08-22 DIAGNOSIS — I11.9 HYPERTENSIVE HEART DISEASE WITHOUT HEART FAILURE: ICD-10-CM

## 2019-08-22 DIAGNOSIS — I87.2 VENOUS STASIS DERMATITIS OF BOTH LOWER EXTREMITIES: ICD-10-CM

## 2019-08-22 DIAGNOSIS — R06.02 SOB (SHORTNESS OF BREATH): ICD-10-CM

## 2019-08-22 PROCEDURE — 99204 OFFICE O/P NEW MOD 45 MIN: CPT | Mod: S$GLB,,, | Performed by: PHYSICIAN ASSISTANT

## 2019-08-22 PROCEDURE — 3008F PR BODY MASS INDEX (BMI) DOCUMENTED: ICD-10-PCS | Mod: CPTII,S$GLB,, | Performed by: PHYSICIAN ASSISTANT

## 2019-08-22 PROCEDURE — 3008F BODY MASS INDEX DOCD: CPT | Mod: CPTII,S$GLB,, | Performed by: PHYSICIAN ASSISTANT

## 2019-08-22 PROCEDURE — 99204 PR OFFICE/OUTPT VISIT, NEW, LEVL IV, 45-59 MIN: ICD-10-PCS | Mod: S$GLB,,, | Performed by: PHYSICIAN ASSISTANT

## 2019-08-22 RX ORDER — GLYBURIDE 2.5 MG/1
2.5 TABLET ORAL
Qty: 90 TABLET | Refills: 3 | Status: SHIPPED | OUTPATIENT
Start: 2019-08-22 | End: 2019-09-30

## 2019-08-22 NOTE — PROGRESS NOTES
Subjective:       Patient ID: Rashel Munson III is a 59 y.o. male.    Chief Complaint: Hypertension and Diabetes    HPI   Pt in for DM   Last eye exam 2 + yrs ago  Last foot exam over 1 yr.  Need labs  Review of Systems   Constitutional: Positive for fatigue. Negative for activity change, appetite change, chills, diaphoresis, fever and unexpected weight change.   HENT: Negative.    Eyes: Negative.    Respiratory: Negative.  Negative for cough and shortness of breath.    Cardiovascular: Positive for leg swelling. Negative for chest pain.   Gastrointestinal: Negative.    Endocrine: Negative.    Genitourinary: Negative.    Musculoskeletal: Negative.    Skin: Negative.  Negative for rash.   Neurological: Negative.        Objective:      Physical Exam   Constitutional: He appears well-developed and well-nourished. No distress.   HENT:   Head: Normocephalic and atraumatic.   Right Ear: External ear normal.   Left Ear: External ear normal.   Nose: Nose normal.   Mouth/Throat: Oropharynx is clear and moist. No oropharyngeal exudate.   Eyes: Conjunctivae are normal. No scleral icterus.   Neck: Normal range of motion. Neck supple. No tracheal deviation present. No thyromegaly present.   Cardiovascular: Normal rate, regular rhythm, normal heart sounds and intact distal pulses. Exam reveals no gallop and no friction rub.   No murmur heard.  Pulses:       Dorsalis pedis pulses are 2+ on the right side, and 2+ on the left side.        Posterior tibial pulses are 1+ on the right side, and 1+ on the left side.   Pulmonary/Chest: Effort normal and breath sounds normal. No stridor. No respiratory distress. He has no wheezes. He has no rales.   Abdominal: Soft. Bowel sounds are normal. He exhibits no distension and no mass. There is no tenderness. There is no rebound and no guarding.   No organomegaly   Musculoskeletal: He exhibits edema.        Right foot: There is normal range of motion and no deformity.        Left foot: There  is normal range of motion and no deformity.   1 + lower ext edema   Feet:   Right Foot:   Protective Sensation: 10 sites tested. 10 sites sensed.   Skin Integrity: Negative for ulcer, blister, skin breakdown, erythema, warmth, callus or dry skin.   Left Foot:   Protective Sensation: 10 sites tested. 10 sites sensed.   Skin Integrity: Negative for ulcer, blister, skin breakdown, erythema, warmth, callus or dry skin.   Lymphadenopathy:     He has no cervical adenopathy.   Skin: Skin is warm and dry. Rash noted.   Stasis derm both lower ext  Spider and varicose veins both lower ext    Vitals reviewed.      Assessment:       1. Type 2 diabetes mellitus without complication, without long-term current use of insulin    2. Hypertensive heart disease without heart failure    3. Venous insufficiency    4. Uncontrolled hypertension    5. Hypercholesterolemia    6. Severe obesity (BMI >= 40)    7. SOB (shortness of breath)    8. Venous stasis dermatitis of both lower extremities        Plan:       Rashel was seen today for hypertension and diabetes.    Diagnoses and all orders for this visit:    Type 2 diabetes mellitus without complication, without long-term current use of insulin  -     glyBURIDE (DIABETA) 2.5 MG tablet; Take 1 tablet (2.5 mg total) by mouth daily with breakfast.  -     Comprehensive metabolic panel; Future  -     Hemoglobin A1c; Future  -     Lipid panel; Future  -     TSH; Future  -     CBC auto differential; Future  -     Cancel: MICROALBUMIN / CREATININE RATIO URINE  -     Urinalysis; Future  -     MICROALBUMIN / CREATININE RATIO URINE; Future    Hypertensive heart disease without heart failure  -     Comprehensive metabolic panel; Future  -     Hemoglobin A1c; Future  -     Lipid panel; Future  -     TSH; Future  -     CBC auto differential; Future  -     Cancel: MICROALBUMIN / CREATININE RATIO URINE  -     Urinalysis; Future  -     MICROALBUMIN / CREATININE RATIO URINE; Future    Venous insufficiency  -      Comprehensive metabolic panel; Future  -     Hemoglobin A1c; Future  -     Lipid panel; Future  -     TSH; Future  -     CBC auto differential; Future  -     Cancel: MICROALBUMIN / CREATININE RATIO URINE  -     Urinalysis; Future  -     MICROALBUMIN / CREATININE RATIO URINE; Future    Uncontrolled hypertension  -     Comprehensive metabolic panel; Future  -     Hemoglobin A1c; Future  -     Lipid panel; Future  -     TSH; Future  -     CBC auto differential; Future  -     Cancel: MICROALBUMIN / CREATININE RATIO URINE  -     Urinalysis; Future  -     MICROALBUMIN / CREATININE RATIO URINE; Future    Hypercholesterolemia  -     Comprehensive metabolic panel; Future  -     Hemoglobin A1c; Future  -     Lipid panel; Future  -     TSH; Future  -     CBC auto differential; Future  -     Cancel: MICROALBUMIN / CREATININE RATIO URINE  -     Urinalysis; Future  -     MICROALBUMIN / CREATININE RATIO URINE; Future    Severe obesity (BMI >= 40)  -     Comprehensive metabolic panel; Future  -     Hemoglobin A1c; Future  -     Lipid panel; Future  -     TSH; Future  -     CBC auto differential; Future  -     Cancel: MICROALBUMIN / CREATININE RATIO URINE  -     Urinalysis; Future  -     MICROALBUMIN / CREATININE RATIO URINE; Future    SOB (shortness of breath)  -     Comprehensive metabolic panel; Future  -     Hemoglobin A1c; Future  -     Lipid panel; Future  -     TSH; Future  -     CBC auto differential; Future  -     Cancel: MICROALBUMIN / CREATININE RATIO URINE  -     Urinalysis; Future  -     MICROALBUMIN / CREATININE RATIO URINE; Future    Venous stasis dermatitis of both lower extremities  -     Comprehensive metabolic panel; Future  -     Hemoglobin A1c; Future  -     Lipid panel; Future  -     TSH; Future  -     CBC auto differential; Future  -     Cancel: MICROALBUMIN / CREATININE RATIO URINE  -     Urinalysis; Future  -     MICROALBUMIN / CREATININE RATIO URINE; Future    discussed otc's  Salt restriction   Proper  leg elevation  Discussed diet  Support hose

## 2019-08-22 NOTE — LETTER
August 22, 2019      Lennie Lua MD  [address] [facility] [address]  [facility] [address]           [address] [address]  Phone: [phone]          Patient: Rashel Munson III   MR Number: [id number]   YOB: 1960   Date of Visit: 8/22/2019       Dear Dr. Lennie Lua:    Thank you for referring Rashel Munson to me for evaluation. Attached you will find relevant portions of my assessment and plan of care.    If you have questions, please do not hesitate to call me. I look forward to following Rashel Munson along with you.    Sincerely,    Simba Fair PA-C    Enclosure  CC:  No Recipients    If you would like to receive this communication electronically, please contact [email or web address] or [phone] to request more information on [employer] access.    For providers and/or their staff who would like to refer a patient to [facility], please contact us through our one-stop-shop provider referral line, [facility], at [phone].    If you feel you have received this communication in error or would no longer like to receive these types of communications, please e-mail [email or web address]

## 2019-08-29 LAB
LEFT EYE DM RETINOPATHY: NEGATIVE
RIGHT EYE DM RETINOPATHY: NEGATIVE

## 2019-09-05 ENCOUNTER — HOSPITAL ENCOUNTER (OUTPATIENT)
Dept: RADIOLOGY | Facility: HOSPITAL | Age: 59
Discharge: HOME | End: 2019-09-05
Attending: INTERNAL MEDICINE
Payer: COMMERCIAL

## 2019-09-05 ENCOUNTER — CLINICAL SUPPORT (OUTPATIENT)
Dept: CARDIOLOGY | Facility: CLINIC | Age: 59
End: 2019-09-05
Attending: INTERNAL MEDICINE
Payer: COMMERCIAL

## 2019-09-05 ENCOUNTER — TELEPHONE (OUTPATIENT)
Dept: CARDIOLOGY | Facility: CLINIC | Age: 59
End: 2019-09-05

## 2019-09-05 VITALS — WEIGHT: 276 LBS | HEIGHT: 69 IN | BODY MASS INDEX: 40.88 KG/M2

## 2019-09-05 DIAGNOSIS — R94.39 ABNORMAL STRESS TEST: Primary | ICD-10-CM

## 2019-09-05 DIAGNOSIS — I10 UNCONTROLLED HYPERTENSION: ICD-10-CM

## 2019-09-05 DIAGNOSIS — R06.02 SOB (SHORTNESS OF BREATH): ICD-10-CM

## 2019-09-05 LAB
CV STRESS BASE HR: 58 BPM
DIASTOLIC BLOOD PRESSURE: 80 MMHG
NUC REST EJECTION FRACTION: 52
NUC STRESS EJECTION FRACTION: 55 %
OHS CV CPX 1 MINUTE RECOVERY HEART RATE: 96 BPM
OHS CV CPX 85 PERCENT MAX PREDICTED HEART RATE MALE: 137
OHS CV CPX MAX PREDICTED HEART RATE: 161
OHS CV CPX PATIENT IS FEMALE: 0
OHS CV CPX PATIENT IS MALE: 1
OHS CV CPX PEAK DIASTOLIC BLOOD PRESSURE: 79 MMHG
OHS CV CPX PEAK HEAR RATE: 100 BPM
OHS CV CPX PEAK RATE PRESSURE PRODUCT: NORMAL
OHS CV CPX PEAK SYSTOLIC BLOOD PRESSURE: 177 MMHG
OHS CV CPX PERCENT MAX PREDICTED HEART RATE ACHIEVED: 62
OHS CV CPX RATE PRESSURE PRODUCT PRESENTING: 9164
STRESS ECHO TARGET HR: 136.85 BPM
SYSTOLIC BLOOD PRESSURE: 158 MMHG

## 2019-09-05 PROCEDURE — 78452 HT MUSCLE IMAGE SPECT MULT: CPT | Mod: 26,,, | Performed by: INTERNAL MEDICINE

## 2019-09-05 PROCEDURE — 93015 STRESS TEST WITH MYOCARDIAL PERFUSION (CUPID ONLY): ICD-10-PCS | Mod: ,,, | Performed by: INTERNAL MEDICINE

## 2019-09-05 PROCEDURE — 99999 PR PBB SHADOW E&M-EST. PATIENT-LVL I: ICD-10-PCS | Mod: PBBFAC,,,

## 2019-09-05 PROCEDURE — 93015 CV STRESS TEST SUPVJ I&R: CPT | Mod: ,,, | Performed by: INTERNAL MEDICINE

## 2019-09-05 PROCEDURE — 99999 PR PBB SHADOW E&M-EST. PATIENT-LVL I: CPT | Mod: PBBFAC,,,

## 2019-09-05 PROCEDURE — 78452 STRESS TEST WITH MYOCARDIAL PERFUSION (CUPID ONLY): ICD-10-PCS | Mod: 26,,, | Performed by: INTERNAL MEDICINE

## 2019-09-10 PROBLEM — R94.39 ABNORMAL STRESS TEST: Status: ACTIVE | Noted: 2019-09-10

## 2019-09-11 ENCOUNTER — TELEPHONE (OUTPATIENT)
Dept: CARDIOLOGY | Facility: CLINIC | Age: 59
End: 2019-09-11

## 2019-09-11 NOTE — TELEPHONE ENCOUNTER
----- Message from Arabella Lester sent at 9/11/2019  8:14 AM CDT -----  Contact: pt's wife Lissett Galeana would like a call back in regards to questions pt's have about a stent cord he was given. Pt also, needs a note stating he has to be out until Monday for work.    He can be reached at [phone].    Thank you

## 2019-09-12 ENCOUNTER — TELEPHONE (OUTPATIENT)
Dept: CARDIOLOGY | Facility: CLINIC | Age: 59
End: 2019-09-12

## 2019-09-12 NOTE — LETTER
September 12, 2019      [facility]  [address] [facility] [place]  [place] [address]  Phone: [phone]       Patient: Rashel Munson   YOB: 1960      To Whom It May Concern:    Rashel Munson was at [facility] on 9/10/2019 for angiogram procedure. He may return to work on 9/17/2019 with no restrictions. If you have any questions or concerns, or if I can be of further assistance, please do not hesitate to contact me.    Sincerely,    Lennie Lua M.D. F.[facility]ANDOC.

## 2019-09-12 NOTE — TELEPHONE ENCOUNTER
----- Message from Caron Angel sent at 9/12/2019  8:13 AM CDT -----  Contact: wife Lissett Diaz   Patient wife call to speak to nurse regarding patient need doctor excuse for work ,patient wife states they will like excuse that patient return work on 09/17      Need excuse to start from  09/10 -09/17 to return to work ,need excuse fax today please per wife       Wife ask to please fax to wife office at [phone] attention to Lissett ,wife cell is [phone]      Patient contact  [phone] (work)

## 2019-09-16 ENCOUNTER — OFFICE VISIT (OUTPATIENT)
Dept: CARDIOLOGY | Facility: CLINIC | Age: 59
End: 2019-09-16
Payer: COMMERCIAL

## 2019-09-16 VITALS
HEART RATE: 82 BPM | WEIGHT: 279.31 LBS | DIASTOLIC BLOOD PRESSURE: 85 MMHG | HEIGHT: 69 IN | BODY MASS INDEX: 41.37 KG/M2 | SYSTOLIC BLOOD PRESSURE: 162 MMHG

## 2019-09-16 DIAGNOSIS — R06.02 SOB (SHORTNESS OF BREATH): ICD-10-CM

## 2019-09-16 DIAGNOSIS — I11.9 HYPERTENSIVE HEART DISEASE WITHOUT HEART FAILURE: ICD-10-CM

## 2019-09-16 DIAGNOSIS — R00.0 TACHYCARDIA: ICD-10-CM

## 2019-09-16 DIAGNOSIS — Z79.02 LONG TERM (CURRENT) USE OF ANTITHROMBOTICS/ANTIPLATELETS: ICD-10-CM

## 2019-09-16 DIAGNOSIS — I25.118 CORONARY ARTERY DISEASE INVOLVING NATIVE CORONARY ARTERY OF NATIVE HEART WITH OTHER FORM OF ANGINA PECTORIS: Primary | ICD-10-CM

## 2019-09-16 PROBLEM — I25.10 CORONARY ARTERY DISEASE INVOLVING NATIVE CORONARY ARTERY OF NATIVE HEART: Status: ACTIVE | Noted: 2019-09-16

## 2019-09-16 PROCEDURE — 99999 PR PBB SHADOW E&M-EST. PATIENT-LVL IV: ICD-10-PCS | Mod: PBBFAC,,, | Performed by: INTERNAL MEDICINE

## 2019-09-16 PROCEDURE — 93000 ELECTROCARDIOGRAM COMPLETE: CPT | Mod: S$GLB,,, | Performed by: INTERNAL MEDICINE

## 2019-09-16 PROCEDURE — 99999 PR PBB SHADOW E&M-EST. PATIENT-LVL IV: CPT | Mod: PBBFAC,,, | Performed by: INTERNAL MEDICINE

## 2019-09-16 PROCEDURE — 3008F BODY MASS INDEX DOCD: CPT | Mod: CPTII,S$GLB,, | Performed by: INTERNAL MEDICINE

## 2019-09-16 PROCEDURE — 99214 PR OFFICE/OUTPT VISIT, EST, LEVL IV, 30-39 MIN: ICD-10-PCS | Mod: S$GLB,,, | Performed by: INTERNAL MEDICINE

## 2019-09-16 PROCEDURE — 99214 OFFICE O/P EST MOD 30 MIN: CPT | Mod: S$GLB,,, | Performed by: INTERNAL MEDICINE

## 2019-09-16 PROCEDURE — 93000 EKG 12-LEAD: ICD-10-PCS | Mod: S$GLB,,, | Performed by: INTERNAL MEDICINE

## 2019-09-16 PROCEDURE — 3008F PR BODY MASS INDEX (BMI) DOCUMENTED: ICD-10-PCS | Mod: CPTII,S$GLB,, | Performed by: INTERNAL MEDICINE

## 2019-09-16 RX ORDER — LISINOPRIL 40 MG/1
40 TABLET ORAL DAILY
Qty: 90 TABLET | Refills: 1 | Status: SHIPPED | OUTPATIENT
Start: 2019-09-16 | End: 2019-12-16 | Stop reason: CLARIF

## 2019-09-16 RX ORDER — SODIUM CHLORIDE 0.9 % (FLUSH) 0.9 %
10 SYRINGE (ML) INJECTION
Status: DISCONTINUED | OUTPATIENT
Start: 2019-09-16 | End: 2021-12-09

## 2019-09-16 RX ORDER — DOXAZOSIN 8 MG/1
8 TABLET ORAL NIGHTLY
Qty: 90 TABLET | Refills: 1 | Status: SHIPPED | OUTPATIENT
Start: 2019-09-16 | End: 2019-12-16 | Stop reason: CLARIF

## 2019-09-16 RX ORDER — SODIUM CHLORIDE 9 MG/ML
INJECTION, SOLUTION INTRAVENOUS ONCE
Status: CANCELLED | OUTPATIENT
Start: 2019-09-16 | End: 2019-09-16

## 2019-09-16 NOTE — PATIENT INSTRUCTIONS
Angiogram    Arrive for procedure at: [facility] on 9/24/19. Your procedure is scheduled for 12pm.     You will receive a phone call from [facility] Pre-Op Department with further instructions prior to your scheduled procedure.    Notify the nurse if you are ALLERGIC TO IODINE.    FASTING: You MAY NOT have anything to eat or drink AFTER MIDNIGHT the day before your procedure. If your procedure is scheduled in the afternoon, you may have a LIGHT BREAKFAST 8 hours prior to your procedure.  For example: Two slices of toast; black coffee or black tea.    MEDICATIONS: You may take your regular morning medications with water. If there are any medications that you should not take, you will be instructed to hold them for that morning.    ? CARDIOLOGY PRE-PROCEDURE MEDICATION ORDERS:  ** Please hold any medications that are checked below:    HOLD   # OF DAYS TO HOLD    ? Metformin    Day before procedure & morning of procedure    CONTINUE the Following Medications   ? Plavix        ? Aspirin    WHAT TO EXPECT:    How long will the procedure take?  The procedure will take an average of 1 - 2 hours to perform.  After the procedure, you will need to lay flat for around 4 - 6 hours to minimize bleeding from the puncture site. If the wrist is accessed you will need to keep your arm still as instructed by the nurse.    When can I go home?  You may be able to be discharged home that same afternoon if there were no complications.  If you have one of the following: balloon; stent; pacemaker or defibrillator procedures, you may spend one night for observation.  Your doctor will determine your discharge based upon your progress.  The results of your procedure will be discussed with you before you are discharged.  Any further testing or procedures will be scheduled for you either before you leave or you will be instructed to call for a future appointment.      TRANSPORTATION:  PLEASE ARRANGE TO HAVE SOMEONE DRIVE YOU HOME  FOLLOWING YOUR PROCEDURE, YOU WILL NOT BE ALLOWED TO DRIVE.

## 2019-09-16 NOTE — PROGRESS NOTES
Subjective:    Patient ID:  Rashel Munson III is a 59 y.o. male who presents for Hypertension and Coronary Artery Disease (LHC with 2 stents)        HPI  S/P LHC, SEVERE LAD DISEASE, HAD PCI/ PHAM, REVIEWED WITH PT STILL WITH SIGNIFICANT OM DISEASE, TO BE STAGED , HIGH LVEDP, BLOOD PRESSURE BETTER BUT STILL ELEVATED STILL WITH SOME SHORTNESS OF BREATH, SEE ROS    Past Medical History:   Diagnosis Date    Angina pectoris     Coronary artery disease     Diabetes mellitus     Hyperlipidemia     Hypertension     Mitral valve disorder     PVD (peripheral vascular disease)     Sleep apnea      Past Surgical History:   Procedure Laterality Date    Angiogram, Coronary, with Left Heart Cath  9/10/2019    Performed by Lennie [facility]ua MD at [facility] CATH    CARDIAC CATHETERIZATION  2011    COLONOSCOPY W/ POLYPECTOMY      ESOPHAGOGASTRODUODENOSCOPY (EGD) WITH DILATION      INSERTION, STENT, CORONARY ARTERY N/A 9/10/2019    Performed by [facility]ua [facility] at [facility] CATH    Left heart cath Left 9/10/2019    Performed by Lennie Lua MD at [facility] CATH     Family History   Problem Relation Age of Onset    Hypertension Mother     Aneurysm Father     Hypertension Father     Deep vein thrombosis Father      Social History     Socioeconomic History    Marital status:      Spouse name: Not on file    Number of children: Not on file    Years of education: Not on file    Highest education level: Not on file   Occupational History    Not on file   Social Needs    Financial resource strain: Not on file    Food insecurity:     Worry: Not on file     Inability: Not on file    Transportation needs:     Medical: Not on file     Non-medical: Not on file   Tobacco Use    Smoking status: Never Smoker    Smokeless tobacco: Never Used   Substance and Sexual Activity    Alcohol use: No    Drug use: No    Sexual activity: Not on file   Lifestyle    Physical activity:     Days per week: Not on file     Minutes per  session: Not on file    Stress: Not on file   Relationships    Social connections:     Talks on phone: Not on file     Gets together: Not on file     Attends Gnosticism service: Not on file     Active member of club or organization: Not on file     Attends meetings of clubs or organizations: Not on file     Relationship status: Not on file   Other Topics Concern    Not on file   Social History Narrative    Not on file       Review of patient's allergies indicates:  No Known Allergies    Current Outpatient Medications:     amlodipine (NORVASC) 10 MG tablet, Take 1 tablet (10 mg total) by mouth once daily., Disp: 90 tablet, Rfl: 1    aspirin (ECOTRIN) 325 MG EC tablet, Take 325 mg by mouth once daily., Disp: , Rfl:     clopidogrel (PLAVIX) 75 mg tablet, Take 1 tablet (75 mg total) by mouth once daily., Disp: 30 tablet, Rfl: 11    glyBURIDE (DIABETA) 2.5 MG tablet, Take 1 tablet (2.5 mg total) by mouth daily with breakfast., Disp: 90 tablet, Rfl: 3    hydrALAZINE (APRESOLINE) 100 MG tablet, Take 1 tablet (100 mg total) by mouth 2 (two) times daily., Disp: 180 tablet, Rfl: 1    hydrochlorothiazide (HYDRODIURIL) 25 MG tablet, Take 1 tablet (25 mg total) by mouth once daily. Four times a week, Disp: 90 tablet, Rfl: 1    lisinopril (PRINIVIL,ZESTRIL) 40 MG tablet, Take 1 tablet (40 mg total) by mouth once daily., Disp: 90 tablet, Rfl: 1    metformin (GLUCOPHAGE) 1000 MG tablet, Take 1,000 mg by mouth 2 (two) times daily with meals., Disp: , Rfl:     nitroGLYCERIN (NITROSTAT) 0.4 MG SL tablet, Place 1 tablet (0.4 mg total) under the tongue every 5 (five) minutes as needed., Disp: 25 tablet, Rfl: 0    pantoprazole (PROTONIX) 40 MG tablet, Take 40 mg by mouth once daily., Disp: , Rfl:     potassium chloride SA (K-DUR,KLOR-CON) 20 MEQ tablet, TAKE 1 TABLET DAILY WITH FOOD (Patient taking differently: TAKE 1 TABLET DAILY WITH FOOD 4x weekly), Disp: 90 tablet, Rfl: 1    pravastatin (PRAVACHOL) 40 MG tablet, Take 1  tablet (40 mg total) by mouth once daily. (Patient taking differently: Take 40 mg by mouth nightly. ), Disp: 90 tablet, Rfl: 1    doxazosin (CARDURA) 8 MG Tab, Take 1 tablet (8 mg total) by mouth every evening., Disp: 90 tablet, Rfl: 1    metoprolol succinate (TOPROL-XL) 200 MG 24 hr tablet, Take 1 tablet (200 mg total) by mouth once daily., Disp: 90 tablet, Rfl: 1    Current Facility-Administered Medications:     sodium chloride 0.9% flush 10 mL, 10 mL, Intravenous, PRN, Lennie Lua MD    Review of Systems   Constitution: Negative for chills, diaphoresis, fever, malaise/fatigue and night sweats.   HENT: Negative for congestion and nosebleeds.    Eyes: Negative for blurred vision and visual disturbance.   Cardiovascular: Negative for chest pain, claudication, cyanosis, dyspnea on exertion (MILD), irregular heartbeat, leg swelling (MILD), near-syncope, orthopnea, palpitations, paroxysmal nocturnal dyspnea and syncope.   Respiratory: Negative for cough, hemoptysis, shortness of breath (SOME) and wheezing.    Endocrine: Negative for cold intolerance, heat intolerance and polyuria.        BG FLUCTUAUES   Hematologic/Lymphatic: Negative for adenopathy. Does not bruise/bleed easily.   Skin: Negative for color change, itching, nail changes and rash.   Musculoskeletal: Negative for back pain, falls and joint pain (KNEES).   Gastrointestinal: Negative for abdominal pain, change in bowel habit, dysphagia, heartburn, hematemesis, jaundice, melena and vomiting.   Genitourinary: Negative for dysuria, flank pain and frequency.   Neurological: Negative for brief paralysis, dizziness, focal weakness, light-headedness, loss of balance, numbness, paresthesias, seizures and weakness.   Psychiatric/Behavioral: Negative for altered mental status, depression and memory loss. The patient is not nervous/anxious.    Allergic/Immunologic: Negative for hives and persistent infections.        Objective:      Vitals:    09/16/19 1132  "  BP: (!) 162/85   Pulse: 82   Weight: 126.7 kg (279 lb 5.2 oz)   Height: 5' 9" (1.753 m)   PainSc: 0-No pain     Body mass index is 41.25 kg/m².    Physical Exam   Constitutional: He is oriented to person, place, and time. He appears well-developed and well-nourished. He is active.   OBESE   HENT:   Head: Normocephalic and atraumatic.   Mouth/Throat: Oropharynx is clear and moist and mucous membranes are normal.   Eyes: Pupils are equal, round, and reactive to light. Conjunctivae and EOM are normal.   Neck: Normal range of motion. Neck supple. Normal carotid pulses, no hepatojugular reflux and no JVD present. Carotid bruit is not present. No edema and no erythema present. No thyromegaly present.   Cardiovascular: Normal rate, regular rhythm and normal heart sounds.  No extrasystoles are present. PMI is not displaced. Exam reveals no gallop, no distant heart sounds, no friction rub and no midsystolic click.   No murmur heard.  Pulses:       Carotid pulses are 2+ on the right side, and 2+ on the left side.       Radial pulses are 2+ on the right side, and 2+ on the left side.        Femoral pulses are 2+ on the right side, and 2+ on the left side.       Popliteal pulses are 2+ on the right side, and 2+ on the left side.        Dorsalis pedis pulses are 2+ on the right side, and 2+ on the left side.        Posterior tibial pulses are 2+ on the right side, and 2+ on the left side.   Pulmonary/Chest: Effort normal and breath sounds normal. No accessory muscle usage. No tachypnea and no bradypnea. No respiratory distress.   Abdominal: Soft. Bowel sounds are normal. He exhibits no distension and no mass. There is no hepatosplenomegaly. There is no CVA tenderness.   Musculoskeletal: Normal range of motion. He exhibits no edema or deformity.   Lymphadenopathy:     He has no cervical adenopathy.   Neurological: He is alert and oriented to person, place, and time. He has normal strength. He displays no tremor. No cranial " nerve deficit.   Skin: Skin is warm and dry. No cyanosis or erythema. No pallor.   Psychiatric: He has a normal mood and affect. His speech is normal and behavior is normal.               ..    Chemistry        Component Value Date/Time     09/05/2019 0830    K 4.2 09/05/2019 0830     09/05/2019 0830    CO2 26 09/05/2019 0830    BUN 19 09/05/2019 0830    CREATININE 1.0 09/05/2019 0830     (H) 09/05/2019 0830        Component Value Date/Time    CALCIUM 9.3 09/05/2019 0830    ALKPHOS 78 09/05/2019 0830    AST 17 09/05/2019 0830    ALT 22 09/05/2019 0830    BILITOT 0.7 09/05/2019 0830    ESTGFRAFRICA >60.0 09/05/2019 0830    EGFRNONAA >60.0 09/05/2019 0830            ..  Lab Results   Component Value Date    CHOL 110 (L) 09/05/2019    CHOL 110 (L) 09/05/2019     Lab Results   Component Value Date    HDL 34 (L) 09/05/2019    HDL 34 (L) 09/05/2019     Lab Results   Component Value Date    LDLCALC 47.6 (L) 09/05/2019    LDLCALC 47.6 (L) 09/05/2019     Lab Results   Component Value Date    TRIG 142 09/05/2019    TRIG 142 09/05/2019     Lab Results   Component Value Date    CHOLHDL 30.9 09/05/2019    CHOLHDL 30.9 09/05/2019     ..  Lab Results   Component Value Date    WBC 5.38 09/05/2019    HGB 14.0 09/05/2019    HCT 42.2 09/05/2019    MCV 88 09/05/2019     09/05/2019       Test(s) Reviewed  I have reviewed the following in detail:  [x] Stress test   [x] Angiography   [] Echocardiogram   [x] Labs   [x] Other:       Assessment:         ICD-10-CM ICD-9-CM   1. Coronary artery disease involving native coronary artery of native heart with other form of angina pectoris I25.118 414.01     413.9   2. Long term (current) use of antithrombotics/antiplatelets Z79.02 V58.63   3. Hypertensive heart disease without heart failure I11.9 402.90   4. SOB (shortness of breath) R06.02 786.05   5. Tachycardia R00.0 785.0     Problem List Items Addressed This Visit        Cardiac/Vascular    Tachycardia     Hypertensive heart disease without heart failure    Relevant Orders    Full code    Coronary artery disease involving native coronary artery of native heart - Primary    Relevant Orders    Case Request-Cath Lab: INSERTION, STENT, CORONARY ARTERY (Completed)    Full code       Hematology    Long term (current) use of antithrombotics/antiplatelets       Other    SOB (shortness of breath)    Relevant Orders    Case Request-Cath Lab: INSERTION, STENT, CORONARY ARTERY (Completed)    Full code           Plan:     EKG SR, NS T CHANGES, LAE, INCREASE CARDURA TO 8 MG, PCI OF LCX/ STAGED, CHECK RENAL AA, ALL OTHER CV CLINICALLY STABLE,  NO HF, NO TIA, NO CLINICAL ARRHYTHMIA,CONTINUE CURRENT MEDS, EDUCATION, DIET, EXERCISE, WEIGHT LOSS, NEEDS BETTER DIABETES CONTROL, DISCUSSED PLAN WITH THE PATIENT IN DETAILS      Coronary artery disease involving native coronary artery of native heart with other form of angina pectoris  -     Case Request-Cath Lab: INSERTION, STENT, CORONARY ARTERY; Standing  -     Full code; Standing    Long term (current) use of antithrombotics/antiplatelets    Hypertensive heart disease without heart failure  -     Full code; Standing  -     SCHEDULED EKG 12-LEAD (to Muse)    SOB (shortness of breath)  -     Case Request-Cath Lab: INSERTION, STENT, CORONARY ARTERY; Standing  -     Full code; Standing    Tachycardia  -     SCHEDULED EKG 12-LEAD (to Muse)    Other orders  -     lisinopril (PRINIVIL,ZESTRIL) 40 MG tablet; Take 1 tablet (40 mg total) by mouth once daily.  Dispense: 90 tablet; Refill: 1  -     doxazosin (CARDURA) 8 MG Tab; Take 1 tablet (8 mg total) by mouth every evening.  Dispense: 90 tablet; Refill: 1  -     sodium chloride 0.9% flush 10 mL    RTC Low level/low impact aerobic exercise 5x's/wk. Heart healthy diet and risk factor modification.    See labs and med orders.    Aerobic exercise 5x's/wk. Heart healthy diet and risk factor modification.    See labs and med orders.

## 2019-09-16 NOTE — LETTER
September 16, 2019      [facility]  [address] [facility] [address]  [place] [address]  Phone: [phone]       Patient: Rashel Munson   YOB: 1960  Date of Visit: 09/16/2019    To Whom It May Concern:    Bailey Munson  was at [facility] on 09/16/2019. He is not clear to return back to work until after his procedure on 9/24/19. If you have any questions or concerns, or if I can be of further assistance, please do not hesitate to contact me.    Sincerely,      Lennie MEYER/O

## 2019-09-17 DIAGNOSIS — R00.0 TACHYCARDIA: ICD-10-CM

## 2019-09-17 DIAGNOSIS — I11.9 HYPERTENSIVE HEART DISEASE WITHOUT HEART FAILURE: Primary | ICD-10-CM

## 2019-09-19 ENCOUNTER — OFFICE VISIT (OUTPATIENT)
Dept: PRIMARY CARE CLINIC | Facility: CLINIC | Age: 59
End: 2019-09-19
Payer: COMMERCIAL

## 2019-09-19 VITALS
HEART RATE: 78 BPM | DIASTOLIC BLOOD PRESSURE: 68 MMHG | WEIGHT: 280.44 LBS | BODY MASS INDEX: 41.54 KG/M2 | HEIGHT: 69 IN | OXYGEN SATURATION: 96 % | SYSTOLIC BLOOD PRESSURE: 150 MMHG

## 2019-09-19 DIAGNOSIS — I11.9 HYPERTENSIVE HEART DISEASE WITHOUT HEART FAILURE: ICD-10-CM

## 2019-09-19 DIAGNOSIS — I25.10 CORONARY ARTERY DISEASE INVOLVING NATIVE CORONARY ARTERY OF NATIVE HEART WITHOUT ANGINA PECTORIS: ICD-10-CM

## 2019-09-19 DIAGNOSIS — I25.10 CORONARY ARTERY DISEASE INVOLVING NATIVE CORONARY ARTERY OF NATIVE HEART WITHOUT ANGINA PECTORIS: Primary | ICD-10-CM

## 2019-09-19 DIAGNOSIS — I10 UNCONTROLLED HYPERTENSION: ICD-10-CM

## 2019-09-19 DIAGNOSIS — E11.9 TYPE 2 DIABETES MELLITUS WITHOUT COMPLICATION, WITHOUT LONG-TERM CURRENT USE OF INSULIN: Primary | ICD-10-CM

## 2019-09-19 PROCEDURE — 3008F BODY MASS INDEX DOCD: CPT | Mod: CPTII,S$GLB,, | Performed by: PHYSICIAN ASSISTANT

## 2019-09-19 PROCEDURE — 3045F PR MOST RECENT HEMOGLOBIN A1C LEVEL 7.0-9.0%: CPT | Mod: CPTII,S$GLB,, | Performed by: PHYSICIAN ASSISTANT

## 2019-09-19 PROCEDURE — 3008F PR BODY MASS INDEX (BMI) DOCUMENTED: ICD-10-PCS | Mod: CPTII,S$GLB,, | Performed by: PHYSICIAN ASSISTANT

## 2019-09-19 PROCEDURE — 99214 OFFICE O/P EST MOD 30 MIN: CPT | Mod: S$GLB,,, | Performed by: PHYSICIAN ASSISTANT

## 2019-09-19 PROCEDURE — 3045F PR MOST RECENT HEMOGLOBIN A1C LEVEL 7.0-9.0%: ICD-10-PCS | Mod: CPTII,S$GLB,, | Performed by: PHYSICIAN ASSISTANT

## 2019-09-19 PROCEDURE — 99214 PR OFFICE/OUTPT VISIT, EST, LEVL IV, 30-39 MIN: ICD-10-PCS | Mod: S$GLB,,, | Performed by: PHYSICIAN ASSISTANT

## 2019-09-19 RX ORDER — METOPROLOL SUCCINATE 200 MG/1
200 TABLET, EXTENDED RELEASE ORAL DAILY
Qty: 90 TABLET | Refills: 1 | Status: SHIPPED | OUTPATIENT
Start: 2019-09-19 | End: 2019-12-16 | Stop reason: CLARIF

## 2019-09-19 RX ORDER — CLOPIDOGREL BISULFATE 75 MG/1
75 TABLET ORAL DAILY
Qty: 90 TABLET | Refills: 1 | Status: SHIPPED | OUTPATIENT
Start: 2019-09-19 | End: 2020-06-09

## 2019-09-19 NOTE — PROGRESS NOTES
Subjective:       Patient ID: Rashel Munson III is a 59 y.o. male.    Chief Complaint: Diabetes    HPI   Pt in for f/u  Lab tests completed  Pt has had 2 stents since last visit for his cad  Hb A1c 7.8  BP is still elevated  Pt having another stent next wk  Review of Systems   Constitutional: Positive for activity change. Negative for appetite change, chills, diaphoresis, fatigue, fever and unexpected weight change.   HENT: Negative.    Eyes: Negative.    Respiratory: Negative.  Negative for cough and shortness of breath.    Cardiovascular: Negative.  Negative for chest pain and leg swelling.   Gastrointestinal: Negative.    Endocrine: Negative.    Genitourinary: Negative.    Musculoskeletal: Negative.    Skin: Negative.  Negative for rash.   Neurological: Negative.        Objective:      Physical Exam   Constitutional: He is oriented to person, place, and time. He appears well-developed and well-nourished. No distress.   HENT:   Head: Normocephalic and atraumatic.   Right Ear: External ear normal.   Left Ear: External ear normal.   Nose: Nose normal.   Mouth/Throat: Oropharynx is clear and moist. No oropharyngeal exudate.   Eyes: Conjunctivae are normal. No scleral icterus.   Neck: Normal range of motion. Neck supple. No tracheal deviation present. No thyromegaly present.   Cardiovascular: Normal rate, regular rhythm, normal heart sounds and intact distal pulses. Exam reveals no gallop and no friction rub.   No murmur heard.  Pulmonary/Chest: Effort normal and breath sounds normal. No stridor. No respiratory distress. He has no wheezes. He has no rales.   Musculoskeletal: He exhibits no edema.   Lymphadenopathy:     He has no cervical adenopathy.   Neurological: He is alert and oriented to person, place, and time.   Skin: Skin is warm and dry. No rash noted.   Vitals reviewed.      Assessment:       1. Type 2 diabetes mellitus without complication, without long-term current use of insulin    2. Uncontrolled  hypertension    3. Coronary artery disease involving native coronary artery of native heart without angina pectoris        Plan:       Rashel was seen today for diabetes.    Diagnoses and all orders for this visit:    Type 2 diabetes mellitus without complication, without long-term current use of insulin  -     Ambulatory Referral to Diabetes Education; Future    Uncontrolled hypertension  -     Ambulatory Referral to Diabetes Education; Future    Coronary artery disease involving native coronary artery of native heart without angina pectoris  -     Ambulatory Referral to Diabetes Education; Future    discussed diet  Salt restriction  F/u check 30 days  Pt seeing cardiology

## 2019-09-27 ENCOUNTER — TELEPHONE (OUTPATIENT)
Dept: CARDIOLOGY | Facility: CLINIC | Age: 59
End: 2019-09-27

## 2019-09-30 ENCOUNTER — PATIENT MESSAGE (OUTPATIENT)
Dept: ENDOCRINOLOGY | Facility: CLINIC | Age: 59
End: 2019-09-30

## 2019-09-30 ENCOUNTER — OFFICE VISIT (OUTPATIENT)
Dept: ENDOCRINOLOGY | Facility: CLINIC | Age: 59
End: 2019-09-30
Payer: COMMERCIAL

## 2019-09-30 VITALS
HEIGHT: 69 IN | HEART RATE: 88 BPM | BODY MASS INDEX: 41.88 KG/M2 | SYSTOLIC BLOOD PRESSURE: 152 MMHG | DIASTOLIC BLOOD PRESSURE: 80 MMHG | RESPIRATION RATE: 18 BRPM | WEIGHT: 282.75 LBS

## 2019-09-30 DIAGNOSIS — I25.10 CORONARY ARTERY DISEASE INVOLVING NATIVE CORONARY ARTERY OF NATIVE HEART WITHOUT ANGINA PECTORIS: ICD-10-CM

## 2019-09-30 DIAGNOSIS — E11.8 TYPE 2 DIABETES MELLITUS WITH COMPLICATION, WITHOUT LONG-TERM CURRENT USE OF INSULIN: Primary | ICD-10-CM

## 2019-09-30 DIAGNOSIS — I10 UNCONTROLLED HYPERTENSION: ICD-10-CM

## 2019-09-30 DIAGNOSIS — E78.00 HYPERCHOLESTEROLEMIA: ICD-10-CM

## 2019-09-30 DIAGNOSIS — E66.01 MORBID OBESITY WITH BMI OF 40.0-44.9, ADULT: ICD-10-CM

## 2019-09-30 PROCEDURE — 3008F PR BODY MASS INDEX (BMI) DOCUMENTED: ICD-10-PCS | Mod: CPTII,S$GLB,, | Performed by: NURSE PRACTITIONER

## 2019-09-30 PROCEDURE — 99204 PR OFFICE/OUTPT VISIT, NEW, LEVL IV, 45-59 MIN: ICD-10-PCS | Mod: S$GLB,,, | Performed by: NURSE PRACTITIONER

## 2019-09-30 PROCEDURE — 3008F BODY MASS INDEX DOCD: CPT | Mod: CPTII,S$GLB,, | Performed by: NURSE PRACTITIONER

## 2019-09-30 PROCEDURE — 99999 PR PBB SHADOW E&M-EST. PATIENT-LVL V: ICD-10-PCS | Mod: PBBFAC,,, | Performed by: NURSE PRACTITIONER

## 2019-09-30 PROCEDURE — 3045F PR MOST RECENT HEMOGLOBIN A1C LEVEL 7.0-9.0%: CPT | Mod: CPTII,S$GLB,, | Performed by: NURSE PRACTITIONER

## 2019-09-30 PROCEDURE — 99204 OFFICE O/P NEW MOD 45 MIN: CPT | Mod: S$GLB,,, | Performed by: NURSE PRACTITIONER

## 2019-09-30 PROCEDURE — 99999 PR PBB SHADOW E&M-EST. PATIENT-LVL V: CPT | Mod: PBBFAC,,, | Performed by: NURSE PRACTITIONER

## 2019-09-30 PROCEDURE — 3045F PR MOST RECENT HEMOGLOBIN A1C LEVEL 7.0-9.0%: ICD-10-PCS | Mod: CPTII,S$GLB,, | Performed by: NURSE PRACTITIONER

## 2019-09-30 NOTE — PROGRESS NOTES
"Unable to scheduled Diabetes Education per pt "I have to figure out a day off of work and see when my wife is off. I will call back to get an appointment"  "

## 2019-09-30 NOTE — LETTER
September 30, 2019      Simba Fair PA-C  [address]  [address] [address] [address]           [facility]  [address] [place] [address]  Phone: [phone]  Fax: [phone]          Patient: Rashel Munson III   MR Number: [id number]   YOB: 1960   Date of Visit: 9/30/2019       Dear Simba Fair:    Thank you for referring Rashel Munson to me for evaluation. Attached you will find relevant portions of my assessment and plan of care.    If you have questions, please do not hesitate to call me. I look forward to following Rashel Munson along with you.    Sincerely,    Kenyon Gupta  CC:  No Recipients    If you would like to receive this communication electronically, please contact [email or web address] or [phone] to request more information on [employer] Link access.    For providers and/or their staff who would like to refer a patient to [facility], please contact us through our one-stop-shop provider referral line, [facility], at [phone].    If you feel you have received this communication in error or would no longer like to receive these types of communications, please e-mail [email or web address]

## 2019-09-30 NOTE — PROGRESS NOTES
"CC: Mr. Rashel Munson III arrives today for management of Type 2 DM and review of chronic medical conditions, as listed in the Visit Diagnosis section of this encounter.       HPI: Mr. Rashel Munson III was diagnosed with Type 2 DM in ~2010. He was diagnosed based on lab work. Initial treatment consisted of metformin. + FH of DM in maternal GM and uncles. Denies hospitalizations due to DM.   He has CAD and recently is s/p 3 stents in September. Follows with Dr. Lua.     This is his first time being seen in endocrine.     He works for [employer] and also works part time at [employer].    Patient was actually referred to Diabetes Education but was booked with Endocrine instead.    He recently received Livongo meter through his insurance.     BG readings are checked 2-3x/day.            Hypoglycemia: No, but he just started monitoring his glucose.     Missing Insulin/PO medication doses: Rare - has missed PM metformin dose.     Exercise: No formal but on his feet at work.     Dietary Habits: Eats 3 meals/day. Patient reports diet has "been terrible." Snacks on PB crackers. Drinks ~ 2 large glasses of milk/day. .    Last DM education appointment: never    He was recently informed by his insurance that cost of Trulicity, Ozempic, Bydureon would be close to $300 for 90 day supply.       CURRENT DIABETIC MEDS: metformin 1000 mg BID, glyburide 2.5 mg daily  Vial or pen: n/a  Glucometer type: Livongo    Previous DM treatments:  n/a    Last Eye Exam: 9/2019, Dr. Walsh's old office (can't recall provider's name). No DR  Last Podiatry Exam: n/a    REVIEW OF SYSTEMS  Constitutional: no c/o fatigue, weakness, or weight loss.   Eyes: denies visual disturbances.  ENT: denies dysphagia, hearing loss  Cardiac: no palpitations or chest pain.  Respiratory: no cough or dyspnea.  GI: no c/o abdominal pain or nausea. Denies h/o pancreatitis.   : denies urinary frequency, burning, discharge, frequent UTIs  Skin: no lesions or " "rashes.  Musculoskeletal: denies difficulty mobilizing, denies muscle or joint pains  Neuro: no numbness, tingling, or parasthesias.  Endocrine: denies polyphagia, polydipsia, polyuria. Denies personal or family h/o MTC, MEN2.      Personally reviewed Past Medical, Surgical, Social History.    Vital Signs  BP (!) 152/80   Pulse 88   Resp 18   Ht 5' 9" (1.753 m)   Wt 128.3 kg (282 lb 11.8 oz)   BMI 41.75 kg/m²     Personally reviewed the below labs:    Hemoglobin A1C   Date Value Ref Range Status   09/05/2019 7.8 (H) 4.0 - 5.6 % Final     Comment:     ADA Screening Guidelines:  5.7-6.4%  Consistent with prediabetes  >or=6.5%  Consistent with diabetes  High levels of fetal hemoglobin interfere with the HbA1C  assay. Heterozygous hemoglobin variants (HbS, HgC, etc)do  not significantly interfere with this assay.   However, presence of multiple variants may affect accuracy.         Chemistry        Component Value Date/Time     09/24/2019 0727    K 4.0 09/24/2019 0727     09/24/2019 0727    CO2 28 09/24/2019 0727    BUN 18 09/24/2019 0727    CREATININE 0.87 09/24/2019 0727     (H) 09/24/2019 0727        Component Value Date/Time    CALCIUM 8.9 09/24/2019 0727    ALKPHOS 78 09/05/2019 0830    AST 17 09/05/2019 0830    ALT 22 09/05/2019 0830    BILITOT 0.7 09/05/2019 0830    ESTGFRAFRICA >60 09/24/2019 0727    EGFRNONAA >60 09/24/2019 0727          Lab Results   Component Value Date    CHOL 110 (L) 09/05/2019    CHOL 110 (L) 09/05/2019     Lab Results   Component Value Date    HDL 34 (L) 09/05/2019    HDL 34 (L) 09/05/2019     Lab Results   Component Value Date    LDLCALC 47.6 (L) 09/05/2019    LDLCALC 47.6 (L) 09/05/2019     Lab Results   Component Value Date    TRIG 142 09/05/2019    TRIG 142 09/05/2019     Lab Results   Component Value Date    CHOLHDL 30.9 09/05/2019    CHOLHDL 30.9 09/05/2019       Lab Results   Component Value Date    MICALBCREAT 14.0 09/05/2019     Lab Results   Component " Value Date    TSH 0.723 09/05/2019       Estimated Creatinine Clearance: 121.2 mL/min (based on SCr of 0.87 mg/dL).    No results found for: [id number]      PHYSICAL EXAMINATION  Constitutional: Appears well, no distress  Neck: Supple, trachea midline; no thyromegaly or nodules.   Respiratory: CTA, even and unlabored.  Cardiovascular: RRR, no murmurs, no carotid bruits. DP pulses  2+ bilaterally; no edema.    GI: bowel sounds active, no hernia noted  Lymph: no cervical or supraclavicular lymphadenopathy  Skin: warm and dry; no lipohypertrophy, or acanthosis nigracans observed.  Psych: normal affect, pleasant mood, conversant  Musculoskeletal: steady gait, no clubbing, full ROM to all extremities  Neuro: DTR 2+ BUE/2+BLE.  Feet: appropriate footwear.    A1c target < 7%      Assessment/Plan  1. Type 2 diabetes mellitus with complication, without long-term current use of insulin  -- Uncontrolled. Fasting hyperglycemia noted. Also having some PP excursions. Would ideally add GLP-1RA, due to potential for weight loss.  -- I advised pt to notify me if this is costly. Would be worth running a prescription to find out actual cost.   -- start Ozempic 0.25 mg weekly. After 4 weeks, increase dose to 0.5 mg weekly. Discussed medication's mechanism of action, side effects, and contraindications. Advised pt to notify me for extreme nausea, abdominal pain, etc.  -- Stop glyburide once reaching Ozempic 0.5 mg weekly.   -- continue metformin  -- can consider Jardiance in the future, which may help BP.  Ambulatory Referral to Diabetes Education for diet    -- Discussed diagnosis of DM, A1c goals, progression of disease, long term complications and tx options.      2. Coronary artery disease involving native coronary artery of native heart without angina pectoris  -- currently stable  -- follows with cardiology   3. Uncontrolled hypertension  -- uncontrolled  -- managed by cardiology  -- continue current meds and monitor   4.  Hypercholesterolemia  -- controlled  -- continue pravastatin   5. Morbid obesity with BMI of 40.0-44.9, adult  -- uncontrolled  -- increasing insulin resistance  Body mass index is 41.75 kg/m².        FOLLOW UP  Follow up in about 3 months (around 12/30/2019).   Patient instructed to bring BG logs to each follow up   Patient encouraged to call for any BG/medication issues, concerns, or questions.    Orders Placed This Encounter   Procedures    Hemoglobin A1c    Comprehensive metabolic panel    Ambulatory Referral to Diabetes Education

## 2019-10-01 ENCOUNTER — PATIENT MESSAGE (OUTPATIENT)
Dept: ENDOCRINOLOGY | Facility: CLINIC | Age: 59
End: 2019-10-01

## 2019-10-01 ENCOUNTER — PATIENT MESSAGE (OUTPATIENT)
Dept: CARDIOLOGY | Facility: CLINIC | Age: 59
End: 2019-10-01

## 2019-10-01 NOTE — TELEPHONE ENCOUNTER
Please advise: patient stating he is getting SOB easily while walking around and wondering is this normal after procedure.

## 2019-10-07 ENCOUNTER — OFFICE VISIT (OUTPATIENT)
Dept: CARDIOLOGY | Facility: CLINIC | Age: 59
End: 2019-10-07
Payer: COMMERCIAL

## 2019-10-07 VITALS
SYSTOLIC BLOOD PRESSURE: 138 MMHG | HEIGHT: 69 IN | HEART RATE: 97 BPM | BODY MASS INDEX: 41.37 KG/M2 | WEIGHT: 279.31 LBS | DIASTOLIC BLOOD PRESSURE: 75 MMHG

## 2019-10-07 DIAGNOSIS — I25.118 CORONARY ARTERY DISEASE INVOLVING NATIVE CORONARY ARTERY OF NATIVE HEART WITH OTHER FORM OF ANGINA PECTORIS: ICD-10-CM

## 2019-10-07 DIAGNOSIS — R06.02 SOB (SHORTNESS OF BREATH): Primary | ICD-10-CM

## 2019-10-07 DIAGNOSIS — Z95.5 STENTED CORONARY ARTERY: ICD-10-CM

## 2019-10-07 DIAGNOSIS — Z79.02 LONG TERM (CURRENT) USE OF ANTITHROMBOTICS/ANTIPLATELETS: ICD-10-CM

## 2019-10-07 DIAGNOSIS — I11.9 HYPERTENSIVE HEART DISEASE WITHOUT HEART FAILURE: ICD-10-CM

## 2019-10-07 DIAGNOSIS — E66.01 MORBID OBESITY WITH BMI OF 40.0-44.9, ADULT: ICD-10-CM

## 2019-10-07 DIAGNOSIS — I11.9 HYPERTENSIVE HEART DISEASE WITHOUT HEART FAILURE: Primary | ICD-10-CM

## 2019-10-07 PROBLEM — I10 UNCONTROLLED HYPERTENSION: Status: RESOLVED | Noted: 2019-08-19 | Resolved: 2019-10-07

## 2019-10-07 PROCEDURE — 3008F BODY MASS INDEX DOCD: CPT | Mod: CPTII,S$GLB,, | Performed by: INTERNAL MEDICINE

## 2019-10-07 PROCEDURE — 99999 PR PBB SHADOW E&M-EST. PATIENT-LVL IV: ICD-10-PCS | Mod: PBBFAC,,, | Performed by: INTERNAL MEDICINE

## 2019-10-07 PROCEDURE — 99999 PR PBB SHADOW E&M-EST. PATIENT-LVL IV: CPT | Mod: PBBFAC,,, | Performed by: INTERNAL MEDICINE

## 2019-10-07 PROCEDURE — 99214 OFFICE O/P EST MOD 30 MIN: CPT | Mod: S$GLB,,, | Performed by: INTERNAL MEDICINE

## 2019-10-07 PROCEDURE — 3008F PR BODY MASS INDEX (BMI) DOCUMENTED: ICD-10-PCS | Mod: CPTII,S$GLB,, | Performed by: INTERNAL MEDICINE

## 2019-10-07 PROCEDURE — 99214 PR OFFICE/OUTPT VISIT, EST, LEVL IV, 30-39 MIN: ICD-10-PCS | Mod: S$GLB,,, | Performed by: INTERNAL MEDICINE

## 2019-10-07 RX ORDER — HYDROCHLOROTHIAZIDE 25 MG/1
25 TABLET ORAL DAILY
Qty: 90 TABLET | Refills: 1 | Status: SHIPPED | OUTPATIENT
Start: 2019-10-07 | End: 2019-11-06 | Stop reason: ALTCHOICE

## 2019-10-07 RX ORDER — DILTIAZEM HYDROCHLORIDE 180 MG/1
180 CAPSULE, COATED, EXTENDED RELEASE ORAL DAILY
Qty: 30 CAPSULE | Refills: 0 | Status: SHIPPED | OUTPATIENT
Start: 2019-10-07 | End: 2019-11-11 | Stop reason: SDUPTHER

## 2019-10-07 RX ORDER — AMLODIPINE BESYLATE 10 MG/1
10 TABLET ORAL DAILY
Qty: 90 TABLET | Refills: 1 | OUTPATIENT
Start: 2019-10-07

## 2019-10-07 RX ORDER — POTASSIUM CHLORIDE 20 MEQ/1
20 TABLET, EXTENDED RELEASE ORAL DAILY
Qty: 90 TABLET | Refills: 1 | Status: SHIPPED | OUTPATIENT
Start: 2019-10-07 | End: 2023-06-01

## 2019-10-07 NOTE — PROGRESS NOTES
STILL REPORTS SOME SHORTNESS OF BREATH Subjective:    Patient ID:  Rashel Munson III is a 59 y.o. male who presents for Coronary Artery Disease (LHC x 3 stents ); Shortness of Breath; and Hypertension        HPI  S/P PCI, DOING OK, STILL WITH CASTILLO, BP MUCH BETTER, BG BETTER, STILL WITH SOB,WANTS TO WAIT ON RTW, ON SHORT TERM DISABILITY AND WANTS TO STAY ON IT FOR NOW, NO CHEST PAIN PER SE, NO TIA TYPE SYMPTOMS, NO NEAR-SYNCOPE, REVIEWED ANGIOGRAM WITH THE PATIENT AND HIS WIFE, SEE ROS    Past Medical History:   Diagnosis Date    Angina pectoris     Anticoagulant long-term use     Arthritis     Coronary artery disease     stents, last 9/10/19    Diabetes mellitus     Hyperlipidemia     Hypertension     Mitral valve disorder     Sleep apnea     no cpap     Past Surgical History:   Procedure Laterality Date    ANGIOGRAM, CORONARY, WITH LEFT HEART CATHETERIZATION  9/10/2019    Procedure: Angiogram, Coronary, with Left Heart Cath;  Surgeon: Lennie Lua MD;  Location: [facility];  Service: Cardiology;;    ANGIOGRAM, CORONARY, WITH LEFT HEART CATHETERIZATION  9/24/2019    Procedure: Angiogram, Coronary, with Left Heart Cath;  Surgeon: Lennie Lua MD;  Location: [facility];  Service: Cardiology;;    CARDIAC CATHETERIZATION  2011    COLONOSCOPY W/ POLYPECTOMY      CORONARY STENT PLACEMENT N/A 9/10/2019    Procedure: INSERTION, STENT, CORONARY ARTERY;  Surgeon: Lennie Lua MD;  Location: [facility];  Service: Cardiology;  Laterality: N/A;    CORONARY STENT PLACEMENT N/A 9/24/2019    Procedure: INSERTION, STENT, CORONARY ARTERY;  Surgeon: Lennie Lua MD;  Location: [facility];  Service: Cardiology;  Laterality: N/A;    ESOPHAGOGASTRODUODENOSCOPY (EGD) WITH DILATION      LEFT HEART CATHETERIZATION Left 9/10/2019    Procedure: Left heart cath;  Surgeon: Lennie Lua MD;  Location: [facility];  Service: Cardiology;  Laterality: Left;     Family History   Problem Relation Age of Onset    Hypertension  Mother     Aneurysm Father     Hypertension Father     Deep vein thrombosis Father      Social History     Socioeconomic History    Marital status:      Spouse name: Not on file    Number of children: Not on file    Years of education: Not on file    Highest education level: Not on file   Occupational History    Not on file   Social Needs    Financial resource strain: Not on file    Food insecurity:     Worry: Not on file     Inability: Not on file    Transportation needs:     Medical: Not on file     Non-medical: Not on file   Tobacco Use    Smoking status: Never Smoker    Smokeless tobacco: Never Used   Substance and Sexual Activity    Alcohol use: Yes     Comment: few times per yr    Drug use: No    Sexual activity: Not on file   Lifestyle    Physical activity:     Days per week: Not on file     Minutes per session: Not on file    Stress: Not on file   Relationships    Social connections:     Talks on phone: Not on file     Gets together: Not on file     Attends Tenriism service: Not on file     Active member of club or organization: Not on file     Attends meetings of clubs or organizations: Not on file     Relationship status: Not on file   Other Topics Concern    Not on file   Social History Narrative    Not on file       Review of patient's allergies indicates:  No Known Allergies    Current Outpatient Medications:     aspirin (ECOTRIN) 325 MG EC tablet, Take 325 mg by mouth once daily., Disp: , Rfl:     clopidogrel (PLAVIX) 75 mg tablet, Take 1 tablet (75 mg total) by mouth once daily., Disp: 90 tablet, Rfl: 1    doxazosin (CARDURA) 8 MG Tab, Take 1 tablet (8 mg total) by mouth every evening., Disp: 90 tablet, Rfl: 1    hydrALAZINE (APRESOLINE) 100 MG tablet, Take 1 tablet (100 mg total) by mouth 2 (two) times daily., Disp: 180 tablet, Rfl: 1    lisinopril (PRINIVIL,ZESTRIL) 40 MG tablet, Take 1 tablet (40 mg total) by mouth once daily., Disp: 90 tablet, Rfl: 1    metformin  (GLUCOPHAGE) 1000 MG tablet, Take 1,000 mg by mouth 2 (two) times daily with meals., Disp: , Rfl:     metoprolol succinate (TOPROL-XL) 200 MG 24 hr tablet, Take 1 tablet (200 mg total) by mouth once daily., Disp: 90 tablet, Rfl: 1    nitroGLYCERIN (NITROSTAT) 0.4 MG SL tablet, Place 1 tablet (0.4 mg total) under the tongue every 5 (five) minutes as needed., Disp: 25 tablet, Rfl: 0    pantoprazole (PROTONIX) 40 MG tablet, Take 40 mg by mouth once daily., Disp: , Rfl:     pravastatin (PRAVACHOL) 40 MG tablet, Take 1 tablet (40 mg total) by mouth once daily. (Patient taking differently: Take 40 mg by mouth nightly. ), Disp: 90 tablet, Rfl: 1    semaglutide (OZEMPIC) 0.25 mg or 0.5 mg(2 mg/1.5 mL) PnIj, Inject 0.25 mg into the skin every 7 days. After 4 weeks, increase dose to 0.5 mg every 7 days, Disp: 3 Syringe, Rfl: 3    diltiaZEM (CARDIZEM CD) 180 MG 24 hr capsule, Take 1 capsule (180 mg total) by mouth once daily., Disp: 30 capsule, Rfl: 0    hydroCHLOROthiazide (HYDRODIURIL) 25 MG tablet, Take 1 tablet (25 mg total) by mouth once daily. Four times a week, Disp: 90 tablet, Rfl: 1    potassium chloride SA (K-DUR,KLOR-CON) 20 MEQ tablet, Take 1 tablet (20 mEq total) by mouth once daily. with food., Disp: 90 tablet, Rfl: 1    Current Facility-Administered Medications:     sodium chloride 0.9% flush 10 mL, 10 mL, Intravenous, PRN, Lennie Lua MD    Review of Systems   Constitution: Negative for chills, diaphoresis, fever, malaise/fatigue and night sweats. Weight loss: SOME.   HENT: Negative for congestion and nosebleeds.    Eyes: Negative for blurred vision and visual disturbance.   Cardiovascular: Negative for chest pain, claudication, cyanosis, dyspnea on exertion (MILD), irregular heartbeat, leg swelling (MILD), near-syncope, orthopnea, palpitations, paroxysmal nocturnal dyspnea and syncope.   Respiratory: Negative for cough, hemoptysis, shortness of breath (SOME, IN AM WHEN WAKES UP) and wheezing.   "  Endocrine: Negative for cold intolerance, heat intolerance and polyuria.        BG FLUCTUAUES   Hematologic/Lymphatic: Negative for adenopathy. Does not bruise/bleed easily.   Skin: Negative for color change, itching, nail changes and rash.   Musculoskeletal: Negative for back pain, falls and joint pain (KNEES).   Gastrointestinal: Negative for abdominal pain, change in bowel habit, dysphagia, heartburn (WITH PROTONIX), hematemesis, jaundice, melena and vomiting.   Genitourinary: Negative for dysuria, flank pain and frequency.   Neurological: Negative for brief paralysis, dizziness, focal weakness, light-headedness, loss of balance, numbness, paresthesias and weakness.   Psychiatric/Behavioral: Negative for altered mental status, depression and memory loss. The patient is not nervous/anxious.    Allergic/Immunologic: Negative for hives and persistent infections.        Objective:      Vitals:    10/07/19 1055   BP: 138/75   Pulse: 97   Weight: 126.7 kg (279 lb 5.2 oz)   Height: 5' 9" (1.753 m)   PainSc: 0-No pain     Body mass index is 41.25 kg/m².    Physical Exam   Constitutional: He is oriented to person, place, and time. He appears well-developed and well-nourished. He is active.   OBESE   HENT:   Head: Normocephalic and atraumatic.   Mouth/Throat: Oropharynx is clear and moist and mucous membranes are normal.   Eyes: Pupils are equal, round, and reactive to light. Conjunctivae and EOM are normal.   Neck: Normal range of motion. Neck supple. Normal carotid pulses, no hepatojugular reflux and no JVD present. Carotid bruit is not present. No edema and no erythema present. No thyromegaly present.   Cardiovascular: Normal rate, regular rhythm and normal heart sounds.  No extrasystoles are present. PMI is not displaced. Exam reveals no gallop, no distant heart sounds, no friction rub and no midsystolic click.   No murmur heard.  Pulses:       Carotid pulses are 2+ on the right side, and 2+ on the left side.       " Radial pulses are 2+ on the right side, and 2+ on the left side.        Femoral pulses are 2+ on the right side, and 2+ on the left side.       Popliteal pulses are 2+ on the right side, and 2+ on the left side.        Dorsalis pedis pulses are 2+ on the right side, and 2+ on the left side.        Posterior tibial pulses are 2+ on the right side, and 2+ on the left side.   Pulmonary/Chest: Effort normal and breath sounds normal. No accessory muscle usage. No tachypnea and no bradypnea. No respiratory distress.   Abdominal: Soft. Bowel sounds are normal. He exhibits no distension and no mass. There is no hepatosplenomegaly. There is no CVA tenderness.   Musculoskeletal: Normal range of motion. He exhibits no edema or deformity.   Lymphadenopathy:     He has no cervical adenopathy.   Neurological: He is alert and oriented to person, place, and time. He has normal strength. He displays no tremor. No cranial nerve deficit.   Skin: Skin is warm and dry. No cyanosis or erythema. No pallor.   Psychiatric: He has a normal mood and affect. His speech is normal and behavior is normal.               ..    Chemistry        Component Value Date/Time     09/24/2019 0727    K 4.0 09/24/2019 0727     09/24/2019 0727    CO2 28 09/24/2019 0727    BUN 18 09/24/2019 0727    CREATININE 0.87 09/24/2019 0727     (H) 09/24/2019 0727        Component Value Date/Time    CALCIUM 8.9 09/24/2019 0727    ALKPHOS 78 09/05/2019 0830    AST 17 09/05/2019 0830    ALT 22 09/05/2019 0830    BILITOT 0.7 09/05/2019 0830    ESTGFRAFRICA >60 09/24/2019 0727    EGFRNONAA >60 09/24/2019 0727            ..  Lab Results   Component Value Date    CHOL 110 (L) 09/05/2019    CHOL 110 (L) 09/05/2019     Lab Results   Component Value Date    HDL 34 (L) 09/05/2019    HDL 34 (L) 09/05/2019     Lab Results   Component Value Date    LDLCALC 47.6 (L) 09/05/2019    LDLCALC 47.6 (L) 09/05/2019     Lab Results   Component Value Date    TRIG 142  09/05/2019    TRIG 142 09/05/2019     Lab Results   Component Value Date    CHOLHDL 30.9 09/05/2019    CHOLHDL 30.9 09/05/2019     ..  Lab Results   Component Value Date    WBC 6.02 09/24/2019    HGB 14.3 09/24/2019    HCT 42.3 09/24/2019    MCV 85 09/24/2019     09/24/2019       Test(s) Reviewed  I have reviewed the following in detail:  [] Stress test   [x] Angiography   [] Echocardiogram   [x] Labs   [] Other:       Assessment:         ICD-10-CM ICD-9-CM   1. SOB (shortness of breath) R06.02 786.05   2. Coronary artery disease involving native coronary artery of native heart with other form of angina pectoris I25.118 414.01     413.9   3. Long term (current) use of antithrombotics/antiplatelets Z79.02 V58.63   4. Morbid obesity with BMI of 40.0-44.9, adult E66.01 278.01    Z68.41 V85.41   5. Hypertensive heart disease without heart failure I11.9 402.90   6. Stented coronary artery Z95.5 V45.82     Problem List Items Addressed This Visit        Cardiac/Vascular    Coronary artery disease involving native coronary artery of native heart    Relevant Orders    Complete PFT w/ bronchodilator    Cardiac Rehab Phase II    Hypertensive heart disease without heart failure    Relevant Orders    Complete PFT w/ bronchodilator    Stented coronary artery    Relevant Orders    Cardiac Rehab Phase II       Hematology    Long term (current) use of antithrombotics/antiplatelets    Relevant Orders    Complete PFT w/ bronchodilator       Endocrine    Morbid obesity with BMI of 40.0-44.9, adult    Relevant Orders    Complete PFT w/ bronchodilator       Other    SOB (shortness of breath) - Primary    Relevant Orders    Complete PFT w/ bronchodilator    Cardiac Rehab Phase II           Plan:     CHANGE NORVASC TO CARDIZEM, WATCH BLOOD PRESSURE, NO SIGNIFICANT RENAL ARTERY STENOSIS, CHECK PFT'S, AVOID PROTONIX, OK FOR PEPCID, WEIGHT LOSS EMPHASIZED SINCE THIS DEFINITELY IS CONTRIBUTING TO HIS SHORTNESS OF BREATH, START CARDIAC  REHAB,, ALSO NEEDS BETTER DIABETES CONTROL,ALL OTHER CV CLINICALLY STABLE,  NO HF, NO TIA, NO CLINICAL ARRHYTHMIA,CONTINUE CURRENT MEDS, EDUCATION, DIET, EXERCISE, WEIGHT LOSS, RETURN CLINIC IN ABOUT 1-2 MONTHS      SOB (shortness of breath)  -     Complete PFT w/ bronchodilator; Future  -     Cardiac Rehab Phase II; Future    Coronary artery disease involving native coronary artery of native heart with other form of angina pectoris  -     Complete PFT w/ bronchodilator; Future  -     Cardiac Rehab Phase II; Future    Long term (current) use of antithrombotics/antiplatelets  -     Complete PFT w/ bronchodilator; Future    Morbid obesity with BMI of 40.0-44.9, adult  -     Complete PFT w/ bronchodilator; Future    Hypertensive heart disease without heart failure  -     Complete PFT w/ bronchodilator; Future    Stented coronary artery  -     Cardiac Rehab Phase II; Future    Other orders  -     diltiaZEM (CARDIZEM CD) 180 MG 24 hr capsule; Take 1 capsule (180 mg total) by mouth once daily.  Dispense: 30 capsule; Refill: 0    RTC Low level/low impact aerobic exercise 5x's/wk. Heart healthy diet and risk factor modification.    See labs and med orders.    Aerobic exercise 5x's/wk. Heart healthy diet and risk factor modification.    See labs and med orders.

## 2019-10-08 NOTE — TELEPHONE ENCOUNTER
Spoke to Shreya and informed that patient was in office yesterday and would fax office note and return to work letter to number provided. Shreya verbalized understanding

## 2019-10-08 NOTE — TELEPHONE ENCOUNTER
----- Message from Jennifer Gunderson sent at 10/8/2019  9:40 AM CDT -----  Type: Needs Medical Advice    Who Called:  Shreya - Nurse  with [facility]  Symptoms (please be specific):  na  How long has patient had these symptoms:  na  Pharmacy name and phone #:  [facility]  Best Call Back Number: [phone]  Additional Information: has additional questions since patient was seen in the office yesterday 10 07 19/please call

## 2019-10-17 ENCOUNTER — OFFICE VISIT (OUTPATIENT)
Dept: PRIMARY CARE CLINIC | Facility: CLINIC | Age: 59
End: 2019-10-17
Payer: COMMERCIAL

## 2019-10-17 VITALS
HEIGHT: 69 IN | BODY MASS INDEX: 42.74 KG/M2 | HEART RATE: 87 BPM | WEIGHT: 288.56 LBS | OXYGEN SATURATION: 94 % | DIASTOLIC BLOOD PRESSURE: 80 MMHG | SYSTOLIC BLOOD PRESSURE: 158 MMHG

## 2019-10-17 DIAGNOSIS — R60.0 LOCALIZED EDEMA: ICD-10-CM

## 2019-10-17 DIAGNOSIS — G47.33 OBSTRUCTIVE SLEEP APNEA: ICD-10-CM

## 2019-10-17 DIAGNOSIS — Z86.718 HISTORY OF DEEP VEIN THROMBOSIS (DVT) OF LOWER EXTREMITY: ICD-10-CM

## 2019-10-17 DIAGNOSIS — I11.9 HYPERTENSIVE HEART DISEASE WITHOUT HEART FAILURE: Primary | ICD-10-CM

## 2019-10-17 DIAGNOSIS — R06.02 SOB (SHORTNESS OF BREATH): ICD-10-CM

## 2019-10-17 DIAGNOSIS — R53.82 CHRONIC FATIGUE: ICD-10-CM

## 2019-10-17 DIAGNOSIS — E11.8 TYPE 2 DIABETES MELLITUS WITH COMPLICATION, WITHOUT LONG-TERM CURRENT USE OF INSULIN: ICD-10-CM

## 2019-10-17 PROCEDURE — 3008F PR BODY MASS INDEX (BMI) DOCUMENTED: ICD-10-PCS | Mod: CPTII,S$GLB,, | Performed by: PHYSICIAN ASSISTANT

## 2019-10-17 PROCEDURE — 99214 PR OFFICE/OUTPT VISIT, EST, LEVL IV, 30-39 MIN: ICD-10-PCS | Mod: S$GLB,,, | Performed by: PHYSICIAN ASSISTANT

## 2019-10-17 PROCEDURE — 3008F BODY MASS INDEX DOCD: CPT | Mod: CPTII,S$GLB,, | Performed by: PHYSICIAN ASSISTANT

## 2019-10-17 PROCEDURE — 99214 OFFICE O/P EST MOD 30 MIN: CPT | Mod: S$GLB,,, | Performed by: PHYSICIAN ASSISTANT

## 2019-10-17 RX ORDER — FUROSEMIDE 20 MG/1
20 TABLET ORAL DAILY
Qty: 30 TABLET | Refills: 11 | Status: SHIPPED | OUTPATIENT
Start: 2019-10-17 | End: 2019-11-11 | Stop reason: SDUPTHER

## 2019-10-17 NOTE — PROGRESS NOTES
Subjective:       Patient ID: Rashel Munson III is a 59 y.o. male.    Chief Complaint: Diabetes    HPI   Pt has noted SOB since starting Plavix  Pt has noted increase lower ext. Edema  BP is improved but still elevated  Glucose levels are staying around 150  Pt has started Ozempic   Pt with 8 lb wt. Increase over past 30 days  No recent reeval for sleep apnea  Mask is not fitting correctly  No CPAP adjustments  Review of Systems   Constitutional: Positive for activity change and fatigue. Negative for appetite change, chills, diaphoresis, fever and unexpected weight change.   HENT: Negative.    Eyes: Negative.    Respiratory: Positive for shortness of breath. Negative for cough, chest tightness and wheezing.    Cardiovascular: Positive for leg swelling. Negative for chest pain.   Gastrointestinal: Negative.    Endocrine: Negative.    Genitourinary: Negative.    Musculoskeletal: Negative.    Skin: Negative.  Negative for rash.   Neurological: Negative.        Objective:      Physical Exam   Constitutional: He is oriented to person, place, and time. He appears well-developed and well-nourished. No distress.   HENT:   Head: Normocephalic and atraumatic.   Right Ear: External ear normal.   Left Ear: External ear normal.   Nose: Nose normal.   Mouth/Throat: Oropharynx is clear and moist. No oropharyngeal exudate.   Eyes: Conjunctivae are normal. No scleral icterus.   Neck: Normal range of motion. Neck supple. No tracheal deviation present. No thyromegaly present.   Cardiovascular: Normal rate, regular rhythm, normal heart sounds and intact distal pulses. Exam reveals no gallop and no friction rub.   No murmur heard.  Pulmonary/Chest: Effort normal and breath sounds normal. No stridor. No respiratory distress. He has no wheezes. He has no rales.   Musculoskeletal: Normal range of motion. He exhibits edema. He exhibits no tenderness.   Lymphadenopathy:     He has no cervical adenopathy.   Neurological: He is alert and  oriented to person, place, and time.   Skin: Skin is warm and dry. No rash noted.   Vitals reviewed.      Assessment:       1. Hypertensive heart disease without heart failure    2. Type 2 diabetes mellitus with complication, without long-term current use of insulin    3. SOB (shortness of breath)    4. History of deep vein thrombosis (DVT) of lower extremity    5. Localized edema    6. Obstructive sleep apnea    7. Chronic fatigue        Plan:       Rashel was seen today for diabetes.    Diagnoses and all orders for this visit:    Hypertensive heart disease without heart failure    Type 2 diabetes mellitus with complication, without long-term current use of insulin    SOB (shortness of breath)  -     Ambulatory referral to Sleep Disorders    History of deep vein thrombosis (DVT) of lower extremity    Localized edema  -     furosemide (LASIX) 20 MG tablet; Take 1 tablet (20 mg total) by mouth once daily.    Obstructive sleep apnea  -     Ambulatory referral to Sleep Disorders    Chronic fatigue  -     Ambulatory referral to Sleep Disorders    continue leg elevation and salt restriction

## 2019-10-21 ENCOUNTER — NURSE TRIAGE (OUTPATIENT)
Dept: ADMINISTRATIVE | Facility: CLINIC | Age: 59
End: 2019-10-21

## 2019-10-21 NOTE — TELEPHONE ENCOUNTER
Wife is calling due to sob, and pt had stent placement in mid September.  Dr. Lua, cardiologist,  is aware of the sob, had a lung test about 1 week ago, which was told looked ok, per pt's wife.  His sob is significantly worse, has to sit and take a rest break in order to get across the room.  He's supposed to be starting cardiac rehab this Friday.      Reason for Disposition   MODERATE difficulty breathing (e.g., speaks in phrases, SOB even at rest, pulse 100-120) of new onset or worse than normal    Additional Information   Negative: Breathing stopped and hasn't returned   Negative: Choking on something   Negative: SEVERE difficulty breathing (e.g., struggling for each breath, speaks in single words, pulse > 120)   Negative: Bluish (or gray) lips or face   Negative: Difficult to awaken or acting confused (e.g., disoriented, slurred speech)   Negative: Passed out (i.e., fainted, collapsed and was not responding)   Negative: Wheezing started suddenly after medicine, an allergic food, or bee sting   Negative: Stridor   Negative: Slow, shallow and weak breathing   Negative: Sounds like a life-threatening emergency to the triager   Negative: Chest pain   Negative: Wheezing (high pitched whistling sound) and previous asthma attacks or use of asthma medicines   Negative: Difficulty breathing and only present when coughing   Negative: Difficulty breathing and only from stuffy or runny nose    Protocols used: BREATHING DIFFICULTY-A-OH

## 2019-11-06 ENCOUNTER — OFFICE VISIT (OUTPATIENT)
Dept: CARDIOLOGY | Facility: CLINIC | Age: 59
End: 2019-11-06
Payer: COMMERCIAL

## 2019-11-06 VITALS
DIASTOLIC BLOOD PRESSURE: 56 MMHG | HEIGHT: 69 IN | HEART RATE: 85 BPM | BODY MASS INDEX: 42.05 KG/M2 | WEIGHT: 283.94 LBS | SYSTOLIC BLOOD PRESSURE: 132 MMHG | OXYGEN SATURATION: 96 %

## 2019-11-06 DIAGNOSIS — I25.118 CORONARY ARTERY DISEASE INVOLVING NATIVE CORONARY ARTERY OF NATIVE HEART WITH OTHER FORM OF ANGINA PECTORIS: ICD-10-CM

## 2019-11-06 DIAGNOSIS — G47.33 OBSTRUCTIVE SLEEP APNEA: ICD-10-CM

## 2019-11-06 DIAGNOSIS — E66.01 MORBID OBESITY WITH BMI OF 40.0-44.9, ADULT: ICD-10-CM

## 2019-11-06 DIAGNOSIS — I11.9 HYPERTENSIVE HEART DISEASE WITHOUT HEART FAILURE: ICD-10-CM

## 2019-11-06 DIAGNOSIS — R06.02 SOB (SHORTNESS OF BREATH): Primary | ICD-10-CM

## 2019-11-06 PROCEDURE — 99214 PR OFFICE/OUTPT VISIT, EST, LEVL IV, 30-39 MIN: ICD-10-PCS | Mod: S$GLB,,, | Performed by: INTERNAL MEDICINE

## 2019-11-06 PROCEDURE — 99214 OFFICE O/P EST MOD 30 MIN: CPT | Mod: S$GLB,,, | Performed by: INTERNAL MEDICINE

## 2019-11-06 PROCEDURE — 3008F PR BODY MASS INDEX (BMI) DOCUMENTED: ICD-10-PCS | Mod: CPTII,S$GLB,, | Performed by: INTERNAL MEDICINE

## 2019-11-06 PROCEDURE — 3008F BODY MASS INDEX DOCD: CPT | Mod: CPTII,S$GLB,, | Performed by: INTERNAL MEDICINE

## 2019-11-06 RX ORDER — SPIRONOLACTONE 25 MG/1
25 TABLET ORAL DAILY
Qty: 30 TABLET | Refills: 1 | Status: SHIPPED | OUTPATIENT
Start: 2019-11-06 | End: 2019-11-25 | Stop reason: SDUPTHER

## 2019-11-06 NOTE — PROGRESS NOTES
Subjective:    Patient ID:  Rashel Munson III is a 59 y.o. male who presents for Coronary Artery Disease (Stents) and Hypertension        HPI  STARTED CARDIAC REHAB, BP FLUCTUATES, BETTER, STILL WITH CASTILLO, PFT'S WNL,JUST GOT CPAP YESTERDAY, NO CHEST PAIN, NO TIA TYPE SYMPTOMS, NO NEAR-SYNCOPE, SEE ROS    Past Medical History:   Diagnosis Date    Angina pectoris     Anticoagulant long-term use     Arthritis     Coronary artery disease     stents, last 9/10/19    Diabetes mellitus     Hyperlipidemia     Hypertension     Mitral valve disorder     Sleep apnea     no cpap     Past Surgical History:   Procedure Laterality Date    ANGIOGRAM, CORONARY, WITH LEFT HEART CATHETERIZATION  9/10/2019    Procedure: Angiogram, Coronary, with Left Heart Cath;  Surgeon: Lennie Lua MD;  Location: [facility];  Service: Cardiology;;    ANGIOGRAM, CORONARY, WITH LEFT HEART CATHETERIZATION  9/24/2019    Procedure: Angiogram, Coronary, with Left Heart Cath;  Surgeon: Lennie Lua MD;  Location: [facility];  Service: Cardiology;;    CARDIAC CATHETERIZATION  2011    COLONOSCOPY W/ POLYPECTOMY      CORONARY STENT PLACEMENT N/A 9/10/2019    Procedure: INSERTION, STENT, CORONARY ARTERY;  Surgeon: Lennie Lua MD;  Location: [facility];  Service: Cardiology;  Laterality: N/A;    CORONARY STENT PLACEMENT N/A 9/24/2019    Procedure: INSERTION, STENT, CORONARY ARTERY;  Surgeon: Lennie Lua MD;  Location: [facility];  Service: Cardiology;  Laterality: N/A;    ESOPHAGOGASTRODUODENOSCOPY (EGD) WITH DILATION      LEFT HEART CATHETERIZATION Left 9/10/2019    Procedure: Left heart cath;  Surgeon: Lennie Lua MD;  Location: [facility];  Service: Cardiology;  Laterality: Left;     Family History   Problem Relation Age of Onset    Hypertension Mother     Aneurysm Father     Hypertension Father     Deep vein thrombosis Father      Social History     Socioeconomic History    Marital status:      Spouse name: Not on file     Number of children: Not on file    Years of education: Not on file    Highest education level: Not on file   Occupational History    Not on file   Social Needs    Financial resource strain: Not on file    Food insecurity:     Worry: Not on file     Inability: Not on file    Transportation needs:     Medical: Not on file     Non-medical: Not on file   Tobacco Use    Smoking status: Never Smoker    Smokeless tobacco: Never Used   Substance and Sexual Activity    Alcohol use: Yes     Comment: few times per yr    Drug use: No    Sexual activity: Not on file   Lifestyle    Physical activity:     Days per week: Not on file     Minutes per session: Not on file    Stress: Not on file   Relationships    Social connections:     Talks on phone: Not on file     Gets together: Not on file     Attends Scientologist service: Not on file     Active member of club or organization: Not on file     Attends meetings of clubs or organizations: Not on file     Relationship status: Not on file   Other Topics Concern    Not on file   Social History Narrative    Not on file       Review of patient's allergies indicates:  No Known Allergies    Current Outpatient Medications:     aspirin (ECOTRIN) 325 MG EC tablet, Take 325 mg by mouth once daily., Disp: , Rfl:     clopidogrel (PLAVIX) 75 mg tablet, Take 1 tablet (75 mg total) by mouth once daily., Disp: 90 tablet, Rfl: 1    diltiaZEM (CARDIZEM CD) 180 MG 24 hr capsule, Take 1 capsule (180 mg total) by mouth once daily., Disp: 30 capsule, Rfl: 0    doxazosin (CARDURA) 8 MG Tab, Take 1 tablet (8 mg total) by mouth every evening., Disp: 90 tablet, Rfl: 1    furosemide (LASIX) 20 MG tablet, Take 1 tablet (20 mg total) by mouth once daily., Disp: 30 tablet, Rfl: 11    hydrALAZINE (APRESOLINE) 100 MG tablet, Take 1 tablet (100 mg total) by mouth 2 (two) times daily., Disp: 180 tablet, Rfl: 1    lisinopril (PRINIVIL,ZESTRIL) 40 MG tablet, Take 1 tablet (40 mg total) by mouth  once daily., Disp: 90 tablet, Rfl: 1    metformin (GLUCOPHAGE) 1000 MG tablet, Take 1,000 mg by mouth 2 (two) times daily with meals., Disp: , Rfl:     metoprolol succinate (TOPROL-XL) 200 MG 24 hr tablet, Take 1 tablet (200 mg total) by mouth once daily., Disp: 90 tablet, Rfl: 1    nitroGLYCERIN (NITROSTAT) 0.4 MG SL tablet, Place 1 tablet (0.4 mg total) under the tongue every 5 (five) minutes as needed., Disp: 25 tablet, Rfl: 0    pantoprazole (PROTONIX) 40 MG tablet, Take 40 mg by mouth once daily., Disp: , Rfl:     potassium chloride SA (K-DUR,KLOR-CON) 20 MEQ tablet, Take 1 tablet (20 mEq total) by mouth once daily. with food., Disp: 90 tablet, Rfl: 1    pravastatin (PRAVACHOL) 40 MG tablet, Take 1 tablet (40 mg total) by mouth once daily. (Patient taking differently: Take 40 mg by mouth nightly. ), Disp: 90 tablet, Rfl: 1    semaglutide (OZEMPIC) 0.25 mg or 0.5 mg(2 mg/1.5 mL) PnIj, Inject 0.25 mg into the skin every 7 days. After 4 weeks, increase dose to 0.5 mg every 7 days, Disp: 3 Syringe, Rfl: 3    prasugrel (EFFIENT) 10 mg Tab, Take 1 tablet (10 mg total) by mouth once daily., Disp: 30 tablet, Rfl: 1    spironolactone (ALDACTONE) 25 MG tablet, Take 1 tablet (25 mg total) by mouth once daily., Disp: 30 tablet, Rfl: 1    Current Facility-Administered Medications:     sodium chloride 0.9% flush 10 mL, 10 mL, Intravenous, PRN, Lennie Lua MD    Review of Systems   Constitution: Negative for chills, diaphoresis, fever, malaise/fatigue and night sweats. Weight loss: SOME.   HENT: Negative for congestion and nosebleeds.    Eyes: Negative for blurred vision and visual disturbance.   Cardiovascular: Negative for chest pain, claudication, cyanosis, dyspnea on exertion (MILD), irregular heartbeat, leg swelling (MILD), near-syncope, orthopnea, palpitations, paroxysmal nocturnal dyspnea and syncope.   Respiratory: Negative for cough, hemoptysis, shortness of breath (SOME, IN AM WHEN WAKES UP) and  "wheezing.    Endocrine: Negative for cold intolerance, heat intolerance and polyuria.        BG FLUCTUAUES   Hematologic/Lymphatic: Negative for adenopathy. Does not bruise/bleed easily.   Skin: Negative for color change, itching, nail changes and rash.   Musculoskeletal: Negative for back pain, falls and joint pain (KNEES).   Gastrointestinal: Negative for abdominal pain, change in bowel habit, dysphagia, heartburn (WITH PROTONIX), hematemesis, jaundice, melena and vomiting.   Genitourinary: Negative for dysuria, flank pain and frequency.   Neurological: Negative for brief paralysis, dizziness, focal weakness, light-headedness, loss of balance, numbness, paresthesias and weakness.   Psychiatric/Behavioral: Negative for altered mental status, depression and memory loss. The patient is not nervous/anxious.    Allergic/Immunologic: Negative for hives and persistent infections.        Objective:      Vitals:    11/06/19 1003   BP: (!) 132/56   Pulse: 85   SpO2: 96%   Weight: 128.8 kg (283 lb 15.2 oz)   Height: 5' 9" (1.753 m)   PainSc: 0-No pain     Body mass index is 41.93 kg/m².    Physical Exam   Constitutional: He is oriented to person, place, and time. He appears well-developed and well-nourished. He is active.   OBESE   HENT:   Head: Normocephalic and atraumatic.   Mouth/Throat: Oropharynx is clear and moist and mucous membranes are normal.   Eyes: Pupils are equal, round, and reactive to light. Conjunctivae and EOM are normal.   Neck: Normal range of motion. Neck supple. Normal carotid pulses, no hepatojugular reflux and no JVD present. Carotid bruit is not present. No edema and no erythema present. No thyromegaly present.   Cardiovascular: Normal rate, regular rhythm and normal heart sounds.  No extrasystoles are present. PMI is not displaced. Exam reveals no gallop, no distant heart sounds, no friction rub and no midsystolic click.   No murmur heard.  Pulses:       Carotid pulses are 2+ on the right side, and " 2+ on the left side.       Radial pulses are 2+ on the right side, and 2+ on the left side.        Femoral pulses are 2+ on the right side, and 2+ on the left side.       Popliteal pulses are 2+ on the right side, and 2+ on the left side.        Dorsalis pedis pulses are 2+ on the right side, and 2+ on the left side.        Posterior tibial pulses are 2+ on the right side, and 2+ on the left side.   Pulmonary/Chest: Effort normal and breath sounds normal. No accessory muscle usage. No tachypnea and no bradypnea. No respiratory distress.   Abdominal: Soft. Bowel sounds are normal. He exhibits no distension and no mass. There is no hepatosplenomegaly. There is no CVA tenderness.   Musculoskeletal: Normal range of motion. He exhibits no edema or deformity.   Lymphadenopathy:     He has no cervical adenopathy.   Neurological: He is alert and oriented to person, place, and time. He has normal strength. He displays no tremor. No cranial nerve deficit.   Skin: Skin is warm and dry. No cyanosis or erythema. No pallor.   Psychiatric: He has a normal mood and affect. His speech is normal and behavior is normal.               ..    Chemistry        Component Value Date/Time     09/24/2019 0727    K 4.0 09/24/2019 0727     09/24/2019 0727    CO2 28 09/24/2019 0727    BUN 18 09/24/2019 0727    CREATININE 0.87 09/24/2019 0727     (H) 09/24/2019 0727        Component Value Date/Time    CALCIUM 8.9 09/24/2019 0727    ALKPHOS 78 09/05/2019 0830    AST 17 09/05/2019 0830    ALT 22 09/05/2019 0830    BILITOT 0.7 09/05/2019 0830    ESTGFRAFRICA >60 09/24/2019 0727    EGFRNONAA >60 09/24/2019 0727            ..  Lab Results   Component Value Date    CHOL 110 (L) 09/05/2019    CHOL 110 (L) 09/05/2019     Lab Results   Component Value Date    HDL 34 (L) 09/05/2019    HDL 34 (L) 09/05/2019     Lab Results   Component Value Date    LDLCALC 47.6 (L) 09/05/2019    LDLCALC 47.6 (L) 09/05/2019     Lab Results   Component  Value Date    TRIG 142 09/05/2019    TRIG 142 09/05/2019     Lab Results   Component Value Date    CHOLHDL 30.9 09/05/2019    CHOLHDL 30.9 09/05/2019     ..  Lab Results   Component Value Date    WBC 6.02 09/24/2019    HGB 14.3 09/24/2019    HCT 42.3 09/24/2019    MCV 85 09/24/2019     09/24/2019       Test(s) Reviewed  I have reviewed the following in detail:  [] Stress test   [] Angiography   [] Echocardiogram   [x] Labs   [x] Other:       Assessment:         ICD-10-CM ICD-9-CM   1. SOB (shortness of breath) R06.02 786.05   2. Coronary artery disease involving native coronary artery of native heart with other form of angina pectoris I25.118 414.01     413.9   3. Hypertensive heart disease without heart failure I11.9 402.90   4. Morbid obesity with BMI of 40.0-44.9, adult E66.01 278.01    Z68.41 V85.41   5. Obstructive sleep apnea G47.33 327.23     Problem List Items Addressed This Visit        Cardiac/Vascular    Coronary artery disease involving native coronary artery of native heart    Relevant Orders    Nuclear Stress - Cardiology Interpreted    Hypertensive heart disease without heart failure    Relevant Orders    Basic metabolic panel       Endocrine    Morbid obesity with BMI of 40.0-44.9, adult       Other    SOB (shortness of breath) - Primary    Relevant Orders    Nuclear Stress - Cardiology Interpreted    Basic metabolic panel    Obstructive sleep apnea           Plan:     STRESS TEST TO ASSESS FOR ISCHEMIA, ??? SECONDARY GAIN, HAS MULTIPLE INSURANCE POLICIES, HAS FEW MON WITH SHORT TERM DIASABILITY, ADD SPIRONOLACTONE, DC HCTZ SINCE HE IS ON LASIX, NO HEART FAILURE, NO CLINICAL ARRHYTHMIA, NO TIA, RETURN TO CLINIC IN 1-2 MONTHS FOR REASSESSMENT, CONTINUE WITH CARDIAC REHAB IN WEIGHT LOSS      SOB (shortness of breath)  -     Nuclear Stress - Cardiology Interpreted; Future  -     Basic metabolic panel; Future; Expected date: 11/06/2019    Coronary artery disease involving native coronary artery of  native heart with other form of angina pectoris  -     Nuclear Stress - Cardiology Interpreted; Future    Hypertensive heart disease without heart failure  -     Basic metabolic panel; Future; Expected date: 11/06/2019    Morbid obesity with BMI of 40.0-44.9, adult    Obstructive sleep apnea    Other orders  -     spironolactone (ALDACTONE) 25 MG tablet; Take 1 tablet (25 mg total) by mouth once daily.  Dispense: 30 tablet; Refill: 1    RTC Low level/low impact aerobic exercise 5x's/wk. Heart healthy diet and risk factor modification.    See labs and med orders.    Aerobic exercise 5x's/wk. Heart healthy diet and risk factor modification.    See labs and med orders.

## 2019-11-08 ENCOUNTER — PATIENT MESSAGE (OUTPATIENT)
Dept: CARDIOLOGY | Facility: CLINIC | Age: 59
End: 2019-11-08

## 2019-11-08 RX ORDER — PRASUGREL 10 MG/1
10 TABLET, FILM COATED ORAL DAILY
Qty: 30 TABLET | Refills: 1 | Status: SHIPPED | OUTPATIENT
Start: 2019-11-08 | End: 2019-12-16 | Stop reason: CLARIF

## 2019-11-11 DIAGNOSIS — R60.0 LOCALIZED EDEMA: ICD-10-CM

## 2019-11-11 DIAGNOSIS — I11.9 HYPERTENSIVE HEART DISEASE WITHOUT HEART FAILURE: Primary | ICD-10-CM

## 2019-11-11 RX ORDER — DILTIAZEM HYDROCHLORIDE 180 MG/1
180 CAPSULE, COATED, EXTENDED RELEASE ORAL DAILY
Qty: 90 CAPSULE | Refills: 0 | Status: SHIPPED | OUTPATIENT
Start: 2019-11-11 | End: 2019-11-14 | Stop reason: SDUPTHER

## 2019-11-11 RX ORDER — FUROSEMIDE 20 MG/1
20 TABLET ORAL DAILY
Qty: 30 TABLET | Refills: 11 | Status: SHIPPED | OUTPATIENT
Start: 2019-11-11 | End: 2019-12-16 | Stop reason: CLARIF

## 2019-11-14 DIAGNOSIS — I11.9 HYPERTENSIVE HEART DISEASE WITHOUT HEART FAILURE: ICD-10-CM

## 2019-11-14 DIAGNOSIS — I25.10 ATHEROSCLEROSIS OF CORONARY ARTERY, ANGINA PRESENCE UNSPECIFIED, UNSPECIFIED VESSEL OR LESION TYPE, UNSPECIFIED WHETHER NATIVE OR TRANSPLANTED HEART: Primary | ICD-10-CM

## 2019-11-14 RX ORDER — DILTIAZEM HYDROCHLORIDE 180 MG/1
180 CAPSULE, COATED, EXTENDED RELEASE ORAL DAILY
Qty: 90 CAPSULE | Refills: 0 | Status: SHIPPED | OUTPATIENT
Start: 2019-11-14 | End: 2019-12-16 | Stop reason: CLARIF

## 2019-11-14 RX ORDER — PRAVASTATIN SODIUM 40 MG/1
40 TABLET ORAL DAILY
Qty: 90 TABLET | Refills: 1 | Status: SHIPPED | OUTPATIENT
Start: 2019-11-14 | End: 2019-12-16 | Stop reason: CLARIF

## 2019-11-14 RX ORDER — HYDRALAZINE HYDROCHLORIDE 100 MG/1
100 TABLET, FILM COATED ORAL 2 TIMES DAILY
Qty: 180 TABLET | Refills: 1 | Status: SHIPPED | OUTPATIENT
Start: 2019-11-14 | End: 2023-06-20 | Stop reason: SDUPTHER

## 2019-11-19 ENCOUNTER — HOSPITAL ENCOUNTER (OUTPATIENT)
Dept: RADIOLOGY | Facility: HOSPITAL | Age: 59
Discharge: HOME | End: 2019-11-19
Attending: INTERNAL MEDICINE
Payer: COMMERCIAL

## 2019-11-19 ENCOUNTER — CLINICAL SUPPORT (OUTPATIENT)
Dept: CARDIOLOGY | Facility: CLINIC | Age: 59
End: 2019-11-19
Attending: INTERNAL MEDICINE
Payer: COMMERCIAL

## 2019-11-19 VITALS — BODY MASS INDEX: 41.92 KG/M2 | HEIGHT: 69 IN | WEIGHT: 283 LBS

## 2019-11-19 DIAGNOSIS — I25.118 CORONARY ARTERY DISEASE INVOLVING NATIVE CORONARY ARTERY OF NATIVE HEART WITH OTHER FORM OF ANGINA PECTORIS: ICD-10-CM

## 2019-11-19 DIAGNOSIS — R06.02 SOB (SHORTNESS OF BREATH): ICD-10-CM

## 2019-11-19 LAB
CV STRESS BASE HR: 81 BPM
DIASTOLIC BLOOD PRESSURE: 72 MMHG
NUC REST EJECTION FRACTION: 56
NUC STRESS EJECTION FRACTION: 57 %
OHS CV CPX 1 MINUTE RECOVERY HEART RATE: 115 BPM
OHS CV CPX 85 PERCENT MAX PREDICTED HEART RATE MALE: 137
OHS CV CPX MAX PREDICTED HEART RATE: 161
OHS CV CPX PATIENT IS FEMALE: 0
OHS CV CPX PATIENT IS MALE: 1
OHS CV CPX PEAK DIASTOLIC BLOOD PRESSURE: 75 MMHG
OHS CV CPX PEAK HEAR RATE: 125 BPM
OHS CV CPX PEAK RATE PRESSURE PRODUCT: NORMAL
OHS CV CPX PEAK SYSTOLIC BLOOD PRESSURE: 168 MMHG
OHS CV CPX PERCENT MAX PREDICTED HEART RATE ACHIEVED: 78
OHS CV CPX RATE PRESSURE PRODUCT PRESENTING: NORMAL
STRESS ECHO TARGET HR: 137 BPM
SYSTOLIC BLOOD PRESSURE: 143 MMHG

## 2019-11-19 PROCEDURE — 93015 STRESS TEST WITH MYOCARDIAL PERFUSION (CUPID ONLY): ICD-10-PCS | Mod: ,,, | Performed by: INTERNAL MEDICINE

## 2019-11-19 PROCEDURE — 93015 CV STRESS TEST SUPVJ I&R: CPT | Mod: ,,, | Performed by: INTERNAL MEDICINE

## 2019-11-19 PROCEDURE — 78452 HT MUSCLE IMAGE SPECT MULT: CPT | Mod: 26,,, | Performed by: INTERNAL MEDICINE

## 2019-11-19 PROCEDURE — 78452 STRESS TEST WITH MYOCARDIAL PERFUSION (CUPID ONLY): ICD-10-PCS | Mod: 26,,, | Performed by: INTERNAL MEDICINE

## 2019-11-19 PROCEDURE — 99999 PR PBB SHADOW E&M-EST. PATIENT-LVL I: ICD-10-PCS | Mod: PBBFAC,,,

## 2019-11-19 PROCEDURE — 99999 PR PBB SHADOW E&M-EST. PATIENT-LVL I: CPT | Mod: PBBFAC,,,

## 2019-11-21 ENCOUNTER — PATIENT MESSAGE (OUTPATIENT)
Dept: CARDIOLOGY | Facility: CLINIC | Age: 59
End: 2019-11-21

## 2019-11-22 ENCOUNTER — TELEPHONE (OUTPATIENT)
Dept: CARDIOLOGY | Facility: CLINIC | Age: 59
End: 2019-11-22

## 2019-11-22 NOTE — TELEPHONE ENCOUNTER
----- Message from Diallo Bautista sent at 11/22/2019 10:13 AM CST -----  Type: Needs Medical Advice    Who Called:  Self   Symptoms (please be specific):  How long has patient had these symptoms:    Pharmacy name and phone #:    Best Call Back Number: [phone]   Additional Information: Patient called asking for the status for short term disability paper work. Paper work is due next week.

## 2019-11-22 NOTE — TELEPHONE ENCOUNTER
----- Message from Diallo Bautista sent at 11/22/2019  1:32 PM CST -----  Type: Needs Medical Advice    Who Called: [facility] representative - [place]   Symptoms (please be specific):   How long has patient had these symptoms:  #  Pharmacy name and phone #:   Best Call Back Number: [phone]  Additional Information: Patient started cardiac rehab, the representative asking how long will pt be required to have cardiac rehab.

## 2019-11-25 RX ORDER — SPIRONOLACTONE 25 MG/1
25 TABLET ORAL DAILY
Qty: 90 TABLET | Refills: 1 | Status: SHIPPED | OUTPATIENT
Start: 2019-11-25 | End: 2022-03-09 | Stop reason: SDUPTHER

## 2019-11-25 NOTE — TELEPHONE ENCOUNTER
----- Message from Martha Gaviria sent at 11/25/2019 12:50 PM CST -----  Contact: [facility]  Type:  Pharmacy Calling to Clarify an RX    Name of Caller:  Cat  Pharmacy Name:  [facility]  Prescription Name:  metformin (GLUCOPHAGE) 1000 MG tablet  What do they need to clarify?:  Pt reuqesting 90 day supply  Best Call Back Number:  [phone]  Additional Information:    Forgot to add this script to previous encounter

## 2019-11-25 NOTE — TELEPHONE ENCOUNTER
----- Message from Derick Gayle sent at 11/25/2019 12:45 PM CST -----  Contact: [facility]/Cat Sheltno called in and wanted to see if patients Rx for spironolactone (ALDACTONE) 25 MG tablet could be changed to a 90 day Rx due to cost?    Cat's call back number is [phone], ref# [id number]

## 2019-11-29 DIAGNOSIS — R60.0 LOCALIZED EDEMA: ICD-10-CM

## 2019-12-02 RX ORDER — METFORMIN HYDROCHLORIDE 1000 MG/1
1000 TABLET ORAL 2 TIMES DAILY WITH MEALS
Qty: 180 TABLET | Refills: 1 | Status: SHIPPED | OUTPATIENT
Start: 2019-12-02 | End: 2020-07-24 | Stop reason: SDUPTHER

## 2019-12-11 ENCOUNTER — LAB VISIT (OUTPATIENT)
Dept: LAB | Facility: HOSPITAL | Age: 59
End: 2019-12-11
Attending: PHYSICIAN ASSISTANT
Payer: COMMERCIAL

## 2019-12-11 DIAGNOSIS — E11.8 TYPE 2 DIABETES MELLITUS WITH COMPLICATION, WITHOUT LONG-TERM CURRENT USE OF INSULIN: ICD-10-CM

## 2019-12-11 LAB
ALBUMIN SERPL BCP-MCNC: 4.1 G/DL (ref 3.5–5.2)
ALP SERPL-CCNC: 99 U/L (ref 55–135)
ALT SERPL W/O P-5'-P-CCNC: 23 U/L (ref 10–44)
ANION GAP SERPL CALC-SCNC: 10 MMOL/L (ref 8–16)
AST SERPL-CCNC: 18 U/L (ref 10–40)
BILIRUB SERPL-MCNC: 0.5 MG/DL (ref 0.1–1)
BUN SERPL-MCNC: 23 MG/DL (ref 6–20)
CALCIUM SERPL-MCNC: 10.6 MG/DL (ref 8.7–10.5)
CHLORIDE SERPL-SCNC: 101 MMOL/L (ref 95–110)
CO2 SERPL-SCNC: 27 MMOL/L (ref 23–29)
CREAT SERPL-MCNC: 1.2 MG/DL (ref 0.5–1.4)
EST. GFR  (AFRICAN AMERICAN): >60 ML/MIN/1.73 M^2
EST. GFR  (NON AFRICAN AMERICAN): >60 ML/MIN/1.73 M^2
ESTIMATED AVG GLUCOSE: 140 MG/DL (ref 68–131)
GLUCOSE SERPL-MCNC: 193 MG/DL (ref 70–110)
HBA1C MFR BLD HPLC: 6.5 % (ref 4–5.6)
POTASSIUM SERPL-SCNC: 4.6 MMOL/L (ref 3.5–5.1)
PROT SERPL-MCNC: 7.7 G/DL (ref 6–8.4)
SODIUM SERPL-SCNC: 138 MMOL/L (ref 136–145)

## 2019-12-11 PROCEDURE — 80053 COMPREHEN METABOLIC PANEL: CPT

## 2019-12-11 PROCEDURE — 36415 COLL VENOUS BLD VENIPUNCTURE: CPT | Mod: PO

## 2019-12-11 PROCEDURE — 83036 HEMOGLOBIN GLYCOSYLATED A1C: CPT

## 2019-12-12 ENCOUNTER — PATIENT MESSAGE (OUTPATIENT)
Dept: ENDOCRINOLOGY | Facility: CLINIC | Age: 59
End: 2019-12-12

## 2019-12-17 ENCOUNTER — PATIENT MESSAGE (OUTPATIENT)
Dept: ENDOCRINOLOGY | Facility: CLINIC | Age: 59
End: 2019-12-17

## 2019-12-23 ENCOUNTER — OFFICE VISIT (OUTPATIENT)
Dept: ENDOCRINOLOGY | Facility: CLINIC | Age: 59
End: 2019-12-23
Payer: COMMERCIAL

## 2019-12-23 VITALS
HEART RATE: 90 BPM | SYSTOLIC BLOOD PRESSURE: 130 MMHG | DIASTOLIC BLOOD PRESSURE: 70 MMHG | HEIGHT: 69 IN | WEIGHT: 274.25 LBS | BODY MASS INDEX: 40.62 KG/M2 | OXYGEN SATURATION: 95 %

## 2019-12-23 DIAGNOSIS — E11.8 TYPE 2 DIABETES MELLITUS WITH COMPLICATION, WITHOUT LONG-TERM CURRENT USE OF INSULIN: Primary | ICD-10-CM

## 2019-12-23 DIAGNOSIS — I25.118 CORONARY ARTERY DISEASE INVOLVING NATIVE CORONARY ARTERY OF NATIVE HEART WITH OTHER FORM OF ANGINA PECTORIS: ICD-10-CM

## 2019-12-23 DIAGNOSIS — E66.01 MORBID OBESITY WITH BMI OF 40.0-44.9, ADULT: ICD-10-CM

## 2019-12-23 DIAGNOSIS — E78.00 HYPERCHOLESTEROLEMIA: ICD-10-CM

## 2019-12-23 DIAGNOSIS — I10 ESSENTIAL HYPERTENSION: ICD-10-CM

## 2019-12-23 PROCEDURE — 99214 OFFICE O/P EST MOD 30 MIN: CPT | Mod: S$GLB,,, | Performed by: NURSE PRACTITIONER

## 2019-12-23 PROCEDURE — 3008F PR BODY MASS INDEX (BMI) DOCUMENTED: ICD-10-PCS | Mod: CPTII,S$GLB,, | Performed by: NURSE PRACTITIONER

## 2019-12-23 PROCEDURE — 99214 PR OFFICE/OUTPT VISIT, EST, LEVL IV, 30-39 MIN: ICD-10-PCS | Mod: S$GLB,,, | Performed by: NURSE PRACTITIONER

## 2019-12-23 PROCEDURE — 3044F PR MOST RECENT HEMOGLOBIN A1C LEVEL <7.0%: ICD-10-PCS | Mod: CPTII,S$GLB,, | Performed by: NURSE PRACTITIONER

## 2019-12-23 PROCEDURE — 3044F HG A1C LEVEL LT 7.0%: CPT | Mod: CPTII,S$GLB,, | Performed by: NURSE PRACTITIONER

## 2019-12-23 PROCEDURE — 99999 PR PBB SHADOW E&M-EST. PATIENT-LVL III: ICD-10-PCS | Mod: PBBFAC,,, | Performed by: NURSE PRACTITIONER

## 2019-12-23 PROCEDURE — 3008F BODY MASS INDEX DOCD: CPT | Mod: CPTII,S$GLB,, | Performed by: NURSE PRACTITIONER

## 2019-12-23 PROCEDURE — 99999 PR PBB SHADOW E&M-EST. PATIENT-LVL III: CPT | Mod: PBBFAC,,, | Performed by: NURSE PRACTITIONER

## 2019-12-23 RX ORDER — AMLODIPINE BESYLATE 10 MG/1
TABLET ORAL
COMMUNITY
Start: 2018-10-01 | End: 2019-12-23

## 2019-12-23 RX ORDER — GLYBURIDE 2.5 MG/1
TABLET ORAL
COMMUNITY
Start: 2018-10-01 | End: 2019-12-23

## 2019-12-23 RX ORDER — METFORMIN HYDROCHLORIDE 1000 MG/1
TABLET ORAL
COMMUNITY
Start: 2018-10-01 | End: 2019-12-23

## 2019-12-23 RX ORDER — AMLODIPINE, VALSARTAN AND HYDROCHLOROTHIAZIDE 10; 320; 25 MG/1; MG/1; MG/1
TABLET ORAL
Refills: 1 | COMMUNITY
Start: 2019-12-03 | End: 2020-06-09

## 2019-12-23 NOTE — PROGRESS NOTES
"CC: Mr. Rashel Munson III arrives today for management of Type 2 DM and review of chronic medical conditions, as listed in the Visit Diagnosis section of this encounter.       HPI: Mr. Rashel Munson III was diagnosed with Type 2 DM in ~2010. He was diagnosed based on lab work. Initial treatment consisted of metformin. + FH of DM in maternal GM and uncles. Denies hospitalizations due to DM.   He has CAD and recently is s/p 3 stents in September 2019, 1 stent in December 2019. Follows with Dr. Mccallum.     Patient was last seen by me in September. At this time, Ozempic was added and glyburide was discontinued.  He was referred to Diabetes Education for diet counseling but did not make this appt.      BG readings are checked 2-3x/day. No logs to meter to clinic. However, does report fasting glucoses range 180-210.    Hypoglycemia: No    Missing Insulin/PO medication doses: Rare    Exercise: Cardiac rehab 3 days/week.    Dietary Habits: Eats 3 meals/day. Denies snacking. Avoids sugary beverages.     Last DM education appointment: never    He reports that he is going back to see Dr. Mccallum in January and may need another (5th) stent. Has continued with shortness of breath over the past several months. Had "breathing test" which pt states was normal.       CURRENT DIABETIC MEDS: metformin 1000 mg BID, Ozempic 0.5 mg weekly  Vial or pen: n/a  Glucometer type: [place]    Previous DM treatments:  glyburide    Last Eye Exam: 9/2019, Dr. Walsh's old office (can't recall provider's name). No DR  Last Podiatry Exam: n/a    REVIEW OF SYSTEMS  Constitutional: no c/o fatigue, weakness. + 8# weight loss since starting Ozempic.   Eyes: denies visual disturbances.  Cardiac: no palpitations or chest pain.  Respiratory: + dry cough. + ongoing dyspnea, which he has reported to cardiology.   GI: no c/o abdominal pain or nausea. Denies h/o pancreatitis.   Skin: no lesions or rashes.  Neuro: no numbness, tingling, or " "parasthesias.  Endocrine: denies polyphagia, polydipsia, polyuria. Denies personal or family h/o MTC, MEN2.      Personally reviewed Past Medical, Surgical, Social History.    Vital Signs  /70   Pulse 90   Ht 5' 9" (1.753 m)   Wt 124.4 kg (274 lb 4 oz)   SpO2 95%   BMI 40.50 kg/m²     Personally reviewed the below labs:    Hemoglobin A1C   Date Value Ref Range Status   12/11/2019 6.5 (H) 4.0 - 5.6 % Final     Comment:     ADA Screening Guidelines:  5.7-6.4%  Consistent with prediabetes  >or=6.5%  Consistent with diabetes  High levels of fetal hemoglobin interfere with the HbA1C  assay. Heterozygous hemoglobin variants (HbS, HgC, etc)do  not significantly interfere with this assay.   However, presence of multiple variants may affect accuracy.     09/05/2019 7.8 (H) 4.0 - 5.6 % Final     Comment:     ADA Screening Guidelines:  5.7-6.4%  Consistent with prediabetes  >or=6.5%  Consistent with diabetes  High levels of fetal hemoglobin interfere with the HbA1C  assay. Heterozygous hemoglobin variants (HbS, HgC, etc)do  not significantly interfere with this assay.   However, presence of multiple variants may affect accuracy.         Chemistry        Component Value Date/Time     12/11/2019 1220    K 4.6 12/11/2019 1220     12/11/2019 1220    CO2 27 12/11/2019 1220    BUN 23 (H) 12/11/2019 1220    CREATININE 1.2 12/11/2019 1220     (H) 12/11/2019 1220        Component Value Date/Time    CALCIUM 10.6 (H) 12/11/2019 1220    ALKPHOS 99 12/11/2019 1220    AST 18 12/11/2019 1220    ALT 23 12/11/2019 1220    BILITOT 0.5 12/11/2019 1220    ESTGFRAFRICA >60.0 12/11/2019 1220    EGFRNONAA >60.0 12/11/2019 1220          Lab Results   Component Value Date    CHOL 110 (L) 09/05/2019    CHOL 110 (L) 09/05/2019     Lab Results   Component Value Date    HDL 34 (L) 09/05/2019    HDL 34 (L) 09/05/2019     Lab Results   Component Value Date    LDLCALC 47.6 (L) 09/05/2019    LDLCALC 47.6 (L) 09/05/2019     Lab " Results   Component Value Date    TRIG 142 09/05/2019    TRIG 142 09/05/2019     Lab Results   Component Value Date    CHOLHDL 30.9 09/05/2019    CHOLHDL 30.9 09/05/2019       Lab Results   Component Value Date    MICALBCREAT 14.0 09/05/2019     Lab Results   Component Value Date    TSH 0.723 09/05/2019       CrCl cannot be calculated (Patient's most recent lab result is older than the maximum 7 days allowed.).    No results found for: [id number]      PHYSICAL EXAMINATION  Constitutional: Appears well, no distress  Neck: Supple, trachea midline; no thyromegaly or nodules.   Respiratory: CTA, even and unlabored.  Cardiovascular: RRR, no murmurs, no carotid bruits. DP pulses  2+ bilaterally; no edema.    GI: bowel sounds active, no hernia noted  Skin: warm and dry; no lipohypertrophy, or acanthosis nigracans observed.  Neuro: DTR diminished to BUE/1+BLE.  Feet: appropriate footwear.    A1c target < 7%      Assessment/Plan  1. Type 2 diabetes mellitus with complication, without long-term current use of insulin  -- A1c has improved. Pt reports fasting hyperglycemia since stopping glyburide.   -- increase Ozempic to 1 mg weekly.  -- continue metformin  -- caution with SGLT2-I, due to use of aldactone and HCTZ  -- can consider adding back DUMONT, if needed  -- check BG 1x/day, alternating times.     -- Discussed diagnosis of DM, A1c goals, progression of disease, long term complications and tx options.      2. Coronary artery disease involving native coronary artery of native heart without angina pectoris  -- recent angiogram with stent.   -- follows with cardiology   3. Hypertension  -- uncontrolled  -- managed by cardiology  -- continue current meds and monitor   4. Hypercholesterolemia  -- controlled  -- continue pravastatin   5. Morbid obesity with BMI of 40.0-44.9, adult  -- improving.  -- increasing insulin resistance  Body mass index is 40.5 kg/m².        FOLLOW UP  Follow up in about 3 months (around 3/23/2020).    Patient instructed to bring BG logs to each follow up   Patient encouraged to call for any BG/medication issues, concerns, or questions.    Orders Placed This Encounter   Procedures    Hemoglobin A1c    Basic metabolic panel

## 2020-02-24 LAB
CHOLEST SERPL-MSCNC: 109 MG/DL (ref 0–200)
HBA1C MFR BLD: 8.4 % (ref 4–6)
HDLC SERPL-MCNC: 25 MG/DL (ref 35–70)
LDL/HDL RATIO: 0.4
LDLC SERPL CALC-MCNC: 9 MG/DL (ref 0–160)
TRIGL SERPL-MCNC: 377 MG/DL (ref 40–160)
VLDLC SERPL-MCNC: 75 MG/DL

## 2020-02-28 ENCOUNTER — PATIENT OUTREACH (OUTPATIENT)
Dept: ADMINISTRATIVE | Facility: HOSPITAL | Age: 60
End: 2020-02-28

## 2020-02-28 NOTE — PROGRESS NOTES
Chart review completed 02/28/2020.  Care Everywhere updates requested and reviewed.  Immunizations reconciled. Media reviewed.      updated with external lab report.        Health Maintenance Due   Topic Date Due    Hepatitis C Screening  1960    HIV Screening  01/22/1975    TETANUS VACCINE  01/22/1978    Pneumococcal Vaccine (Medium Risk) (1 of 1 - PPSV23) 01/22/1979    Shingles Vaccine (1 of 2) 01/22/2010    Colonoscopy  01/22/2010

## 2020-03-02 ENCOUNTER — PATIENT MESSAGE (OUTPATIENT)
Dept: ENDOCRINOLOGY | Facility: CLINIC | Age: 60
End: 2020-03-02

## 2020-04-15 ENCOUNTER — TELEPHONE (OUTPATIENT)
Dept: ENDOCRINOLOGY | Facility: CLINIC | Age: 60
End: 2020-04-15

## 2020-05-06 ENCOUNTER — TELEPHONE (OUTPATIENT)
Dept: ENDOCRINOLOGY | Facility: CLINIC | Age: 60
End: 2020-05-06

## 2020-05-06 ENCOUNTER — PATIENT MESSAGE (OUTPATIENT)
Dept: ENDOCRINOLOGY | Facility: CLINIC | Age: 60
End: 2020-05-06

## 2020-05-06 ENCOUNTER — OFFICE VISIT (OUTPATIENT)
Dept: ENDOCRINOLOGY | Facility: CLINIC | Age: 60
End: 2020-05-06
Payer: COMMERCIAL

## 2020-05-06 DIAGNOSIS — E66.01 MORBID OBESITY WITH BMI OF 40.0-44.9, ADULT: ICD-10-CM

## 2020-05-06 DIAGNOSIS — I10 ESSENTIAL HYPERTENSION: ICD-10-CM

## 2020-05-06 DIAGNOSIS — E78.00 HYPERCHOLESTEROLEMIA: ICD-10-CM

## 2020-05-06 DIAGNOSIS — E11.8 TYPE 2 DIABETES MELLITUS WITH COMPLICATION, WITHOUT LONG-TERM CURRENT USE OF INSULIN: Primary | ICD-10-CM

## 2020-05-06 DIAGNOSIS — I25.10 CORONARY ARTERY DISEASE INVOLVING NATIVE CORONARY ARTERY OF NATIVE HEART WITHOUT ANGINA PECTORIS: ICD-10-CM

## 2020-05-06 PROCEDURE — 3052F PR MOST RECENT HEMOGLOBIN A1C LEVEL 8.0 - < 9.0%: ICD-10-PCS | Mod: CPTII,,, | Performed by: NURSE PRACTITIONER

## 2020-05-06 PROCEDURE — 3052F HG A1C>EQUAL 8.0%<EQUAL 9.0%: CPT | Mod: CPTII,,, | Performed by: NURSE PRACTITIONER

## 2020-05-06 PROCEDURE — 99213 PR OFFICE/OUTPT VISIT, EST, LEVL III, 20-29 MIN: ICD-10-PCS | Mod: 95,,, | Performed by: NURSE PRACTITIONER

## 2020-05-06 PROCEDURE — 99213 OFFICE O/P EST LOW 20 MIN: CPT | Mod: 95,,, | Performed by: NURSE PRACTITIONER

## 2020-05-06 RX ORDER — PEN NEEDLE, DIABETIC 30 GX3/16"
NEEDLE, DISPOSABLE MISCELLANEOUS
Qty: 100 EACH | Refills: 3 | Status: SHIPPED | OUTPATIENT
Start: 2020-05-06 | End: 2021-06-08

## 2020-05-06 RX ORDER — INSULIN DEGLUDEC 100 U/ML
20 INJECTION, SOLUTION SUBCUTANEOUS DAILY
Qty: 45 ML | Refills: 3 | Status: SHIPPED | OUTPATIENT
Start: 2020-05-06 | End: 2020-06-10

## 2020-05-06 NOTE — PROGRESS NOTES
CC: Mr. Rashel Munson III arrives today for management of Type 2 DM and review of chronic medical conditions, as listed in the Visit Diagnosis section of this encounter.       HPI: Mr. Rashel Munson III was diagnosed with Type 2 DM in ~2010. He was diagnosed based on lab work. Initial treatment consisted of metformin. + FH of DM in maternal GM and uncles. Denies hospitalizations due to DM.   He has CAD and recently with 5 stent placements. Follows with Dr. Mccallum.     Patient presents for video visit, due to appointment restrictions related to COVID-19. Labs will not be scheduled at this time, due to lab restrictions for COVID-19.     The patient location is:  Patient Home   The chief complaint leading to consultation is: Type 2 DM  Visit type: Virtual visit with synchronous audio and video  Total time spent with patient: 15 min  Each patient to whom he or she provides medical services by telemedicine is:  (1) informed of the relationship between the physician and patient and the respective role of any other health care provider with respect to management of the patient; and (2) notified that he or she may decline to receive medical services by telemedicine and may withdraw from such care at any time.       Patient was last seen by me in December.     BG readings are checked 1-2x/day. No logs for review. He reports most blood sugars run mid-high 200s, including fasting.     Hypoglycemia: No    Missing Insulin/PO medication doses: No    Exercise: No formal.     Dietary Habits: Eats 3 meals/day. Denies snacking. Avoids sugary beverages.     Last DM education appointment: never      CURRENT DIABETIC MEDS: metformin 1000 mg BID, Ozempic 1 mg weekly  Vial or pen: n/a  Glucometer type: Darlyn    Previous DM treatments:  Glyburide - hypoglycemia    Last Eye Exam: 9/2019, Dr. Walsh's old office (can't recall provider's name). No DR  Last Podiatry Exam: n/a    REVIEW OF SYSTEMS  Constitutional: no c/o fatigue,  weakness. + 10# weight loss since December.  Eyes: denies visual disturbances.  Cardiac: no palpitations or chest pain.  Respiratory: denies cough. + ongoing dyspnea, which he has reported to cardiology.   GI: no c/o abdominal pain or nausea. Denies h/o pancreatitis.   Skin: no lesions or rashes.  Neuro: no numbness, tingling, or parasthesias.  Endocrine: denies polyphagia, polydipsia, polyuria. Denies personal or family h/o MTC, MEN2.      Personally reviewed Past Medical, Surgical, Social History.    Vital Signs  There were no vitals taken for this visit.  - video visit    Personally reviewed the below labs:    Hemoglobin A1C   Date Value Ref Range Status   02/24/2020 8.4 (A) 4.0 - 6.0 % Final   12/11/2019 6.5 (H) 4.0 - 5.6 % Final     Comment:     ADA Screening Guidelines:  5.7-6.4%  Consistent with prediabetes  >or=6.5%  Consistent with diabetes  High levels of fetal hemoglobin interfere with the HbA1C  assay. Heterozygous hemoglobin variants (HbS, HgC, etc)do  not significantly interfere with this assay.   However, presence of multiple variants may affect accuracy.     09/05/2019 7.8 (H) 4.0 - 5.6 % Final     Comment:     ADA Screening Guidelines:  5.7-6.4%  Consistent with prediabetes  >or=6.5%  Consistent with diabetes  High levels of fetal hemoglobin interfere with the HbA1C  assay. Heterozygous hemoglobin variants (HbS, HgC, etc)do  not significantly interfere with this assay.   However, presence of multiple variants may affect accuracy.         Chemistry        Component Value Date/Time     12/11/2019 1220    K 4.6 12/11/2019 1220     12/11/2019 1220    CO2 27 12/11/2019 1220    BUN 23 (H) 12/11/2019 1220    CREATININE 1.2 12/11/2019 1220     (H) 12/11/2019 1220        Component Value Date/Time    CALCIUM 10.6 (H) 12/11/2019 1220    ALKPHOS 99 12/11/2019 1220    AST 18 12/11/2019 1220    ALT 23 12/11/2019 1220    BILITOT 0.5 12/11/2019 1220    ESTGFRAFRICA >60.0 12/11/2019 1220     EGFRNONAA >60.0 12/11/2019 1220          Lab Results   Component Value Date    CHOL 109 02/24/2020    CHOL 110 (L) 09/05/2019    CHOL 110 (L) 09/05/2019     Lab Results   Component Value Date    HDL 25 (A) 02/24/2020    HDL 34 (L) 09/05/2019    HDL 34 (L) 09/05/2019     Lab Results   Component Value Date    LDLCALC 9 02/24/2020    LDLCALC 47.6 (L) 09/05/2019    LDLCALC 47.6 (L) 09/05/2019     Lab Results   Component Value Date    TRIG 377 (A) 02/24/2020    TRIG 142 09/05/2019    TRIG 142 09/05/2019     Lab Results   Component Value Date    CHOLHDL 30.9 09/05/2019    CHOLHDL 30.9 09/05/2019       Lab Results   Component Value Date    MICALBCREAT 14.0 09/05/2019     Lab Results   Component Value Date    TSH 0.723 09/05/2019       CrCl cannot be calculated (Patient's most recent lab result is older than the maximum 7 days allowed.).    No results found for: [id number]      PHYSICAL EXAMINATION  Deferred - video visit    A1c target < 7%      Assessment/Plan  1. Type 2 diabetes mellitus with complication, without long-term current use of insulin  -- Worsening with FBG >200. Needs basal insulin.    -- start Tresiba 20 units QAM. May increase dose by 4 units once weekly until fasting glucoses are < 150  -- continue Ozempic 1 mg weekly.  -- continue metformin  -- caution with potential future SGLT2-I, due to use of aldactone and HCTZ  -- check BG 2x/day    -- Discussed diagnosis of DM, A1c goals, progression of disease, long term complications and tx options.      2. Coronary artery disease involving native coronary artery of native heart without angina pectoris  -- optimize DM control  -- follows with cardiology   3. Hypertension  -- managed by cardiology  -- continue current meds and monitor   4. Hypercholesterolemia  -- uncontrolled with elevated TRG  -- optimize glycemic control  -- continue pravastatin   5. Morbid obesity with BMI of 40.0-44.9, adult  -- improving.  -- increasing insulin resistance.  There is no  height or weight on file to calculate BMI.        FOLLOW UP  Follow up in about 4 weeks (around 6/3/2020).   Patient instructed to bring BG logs to each follow up   Patient encouraged to call for any BG/medication issues, concerns, or questions.

## 2020-06-09 ENCOUNTER — TELEPHONE (OUTPATIENT)
Dept: ENDOCRINOLOGY | Facility: CLINIC | Age: 60
End: 2020-06-09

## 2020-06-09 DIAGNOSIS — E11.8 TYPE 2 DIABETES MELLITUS WITH COMPLICATION, WITHOUT LONG-TERM CURRENT USE OF INSULIN: ICD-10-CM

## 2020-06-09 RX ORDER — INSULIN DEGLUDEC 100 U/ML
28 INJECTION, SOLUTION SUBCUTANEOUS DAILY
Qty: 45 ML | Refills: 3 | Status: CANCELLED | OUTPATIENT
Start: 2020-06-09

## 2020-06-09 RX ORDER — VALSARTAN AND HYDROCHLOROTHIAZIDE 320; 25 MG/1; MG/1
TABLET, FILM COATED ORAL
COMMUNITY
Start: 2020-05-06 | End: 2022-03-09

## 2020-06-09 NOTE — TELEPHONE ENCOUNTER
Confirmed Virtual visit,allergies,meds,pharmacy([facility] or [facility]) and smoking status

## 2020-06-10 ENCOUNTER — OFFICE VISIT (OUTPATIENT)
Dept: ENDOCRINOLOGY | Facility: CLINIC | Age: 60
End: 2020-06-10
Payer: COMMERCIAL

## 2020-06-10 DIAGNOSIS — E78.00 HYPERCHOLESTEROLEMIA: ICD-10-CM

## 2020-06-10 DIAGNOSIS — E66.01 MORBID OBESITY WITH BMI OF 40.0-44.9, ADULT: ICD-10-CM

## 2020-06-10 DIAGNOSIS — E11.8 TYPE 2 DIABETES MELLITUS WITH COMPLICATION, WITHOUT LONG-TERM CURRENT USE OF INSULIN: Primary | ICD-10-CM

## 2020-06-10 DIAGNOSIS — I10 ESSENTIAL HYPERTENSION: ICD-10-CM

## 2020-06-10 DIAGNOSIS — I25.10 CORONARY ARTERY DISEASE INVOLVING NATIVE CORONARY ARTERY OF NATIVE HEART WITHOUT ANGINA PECTORIS: ICD-10-CM

## 2020-06-10 PROCEDURE — 3052F PR MOST RECENT HEMOGLOBIN A1C LEVEL 8.0 - < 9.0%: ICD-10-PCS | Mod: CPTII,,, | Performed by: NURSE PRACTITIONER

## 2020-06-10 PROCEDURE — 99213 OFFICE O/P EST LOW 20 MIN: CPT | Mod: 95,,, | Performed by: NURSE PRACTITIONER

## 2020-06-10 PROCEDURE — 99213 PR OFFICE/OUTPT VISIT, EST, LEVL III, 20-29 MIN: ICD-10-PCS | Mod: 95,,, | Performed by: NURSE PRACTITIONER

## 2020-06-10 PROCEDURE — 3052F HG A1C>EQUAL 8.0%<EQUAL 9.0%: CPT | Mod: CPTII,,, | Performed by: NURSE PRACTITIONER

## 2020-06-10 RX ORDER — INSULIN DEGLUDEC 100 U/ML
36 INJECTION, SOLUTION SUBCUTANEOUS DAILY
Qty: 45 ML | Refills: 3
Start: 2020-06-10 | End: 2020-08-19

## 2020-06-10 RX ORDER — GLIMEPIRIDE 2 MG/1
2 TABLET ORAL
Qty: 90 TABLET | Refills: 3 | Status: SHIPPED | OUTPATIENT
Start: 2020-06-10 | End: 2020-08-19

## 2020-06-10 NOTE — PROGRESS NOTES
CC: Mr. Rashel Munson III arrives today for management of Type 2 DM and review of chronic medical conditions, as listed in the Visit Diagnosis section of this encounter.       HPI: Mr. Rashel Munson III was diagnosed with Type 2 DM in ~2010. He was diagnosed based on lab work. Initial treatment consisted of metformin. + FH of DM in maternal GM and uncles. Denies hospitalizations due to DM.   He has CAD and recently with 5 stent placements. Follows with Dr. Mccallum.     Patient presents for video visit, due to appointment restrictions related to COVID-19.     The patient location is:  Patient Home   The chief complaint leading to consultation is: Type 2 DM  Visit type: Virtual visit with synchronous audio and video  Total time spent with patient: 15 min  Each patient to whom he or she provides medical services by telemedicine is:  (1) informed of the relationship between the physician and patient and the respective role of any other health care provider with respect to management of the patient; and (2) notified that he or she may decline to receive medical services by telemedicine and may withdraw from such care at any time.       Patient presents via video visit for 4 week log review after beginning basal insulin.     He states that he had recent blood work with cardiologist and A1c was 9.2%.     BG readings are checked 1-2x/day. No logs for review. He reports most blood sugars are in low 200s range.     Hypoglycemia: No    Missing Insulin/PO medication doses: No    Exercise: No formal.     Dietary Habits: Eats 3 meals/day. Denies snacking. Avoids sugary beverages.     Last DM education appointment: never      CURRENT DIABETIC MEDS: metformin 1000 mg BID, Ozempic 1 mg weekly, Tresiba 28 units QAM  Vial or pen: n/a  Glucometer type: Darlyn    Previous DM treatments:  Glyburide - discontinued when Ozempic added.     Last Eye Exam: 9/2019, Dr. Walsh's old office (can't recall provider's name). No DR  Last  Podiatry Exam: n/a    REVIEW OF SYSTEMS  Constitutional: no c/o fatigue, weakness, weight loss  Eyes: denies visual disturbances.  Cardiac: no palpitations or chest pain.  Respiratory: denies cough. + ongoing dyspnea on exertion, which he has reported to cardiology. Metoprolol dose has been decreased.   GI: no c/o abdominal pain or nausea. Denies h/o pancreatitis.    Skin: no lesions or rashes.  Neuro: no numbness, tingling, or parasthesias.  Endocrine: denies polyphagia, polydipsia, polyuria. Denies personal or family h/o MTC, MEN2.      Personally reviewed Past Medical, Surgical, Social History.    Vital Signs  There were no vitals taken for this visit.  - video visit    Personally reviewed the below labs:    Hemoglobin A1C   Date Value Ref Range Status   02/24/2020 8.4 (A) 4.0 - 6.0 % Final   12/11/2019 6.5 (H) 4.0 - 5.6 % Final     Comment:     ADA Screening Guidelines:  5.7-6.4%  Consistent with prediabetes  >or=6.5%  Consistent with diabetes  High levels of fetal hemoglobin interfere with the HbA1C  assay. Heterozygous hemoglobin variants (HbS, HgC, etc)do  not significantly interfere with this assay.   However, presence of multiple variants may affect accuracy.     09/05/2019 7.8 (H) 4.0 - 5.6 % Final     Comment:     ADA Screening Guidelines:  5.7-6.4%  Consistent with prediabetes  >or=6.5%  Consistent with diabetes  High levels of fetal hemoglobin interfere with the HbA1C  assay. Heterozygous hemoglobin variants (HbS, HgC, etc)do  not significantly interfere with this assay.   However, presence of multiple variants may affect accuracy.         Chemistry        Component Value Date/Time     12/11/2019 1220    K 4.6 12/11/2019 1220     12/11/2019 1220    CO2 27 12/11/2019 1220    BUN 23 (H) 12/11/2019 1220    CREATININE 1.2 12/11/2019 1220     (H) 12/11/2019 1220        Component Value Date/Time    CALCIUM 10.6 (H) 12/11/2019 1220    ALKPHOS 99 12/11/2019 1220    AST 18 12/11/2019 1220     ALT 23 12/11/2019 1220    BILITOT 0.5 12/11/2019 1220    ESTGFRAFRICA >60.0 12/11/2019 1220    EGFRNONAA >60.0 12/11/2019 1220          Lab Results   Component Value Date    CHOL 109 02/24/2020    CHOL 110 (L) 09/05/2019    CHOL 110 (L) 09/05/2019     Lab Results   Component Value Date    HDL 25 (A) 02/24/2020    HDL 34 (L) 09/05/2019    HDL 34 (L) 09/05/2019     Lab Results   Component Value Date    LDLCALC 9 02/24/2020    LDLCALC 47.6 (L) 09/05/2019    LDLCALC 47.6 (L) 09/05/2019     Lab Results   Component Value Date    TRIG 377 (A) 02/24/2020    TRIG 142 09/05/2019    TRIG 142 09/05/2019     Lab Results   Component Value Date    CHOLHDL 30.9 09/05/2019    CHOLHDL 30.9 09/05/2019       Lab Results   Component Value Date    MICALBCREAT 14.0 09/05/2019     Lab Results   Component Value Date    TSH 0.723 09/05/2019       CrCl cannot be calculated (Patient's most recent lab result is older than the maximum 7 days allowed.).    No results found for: [id number]      PHYSICAL EXAMINATION  Deferred - video visit    A1c target < 7%      Assessment/Plan  1. Type 2 diabetes mellitus with complication, without long-term current use of insulin  -- Uncontrolled with FBG remaining >200.   -- increase Tresiba to 36 units QAM. May increase dose by 4 units once weekly until fasting glucoses are < 150. Not to exceed 60 units without notifying me.   -- start glimepiride 2 mg before breakfast  -- continue Ozempic 1 mg weekly.  -- continue metformin  -- caution with potential future SGLT2-I, due to use of aldactone and HCTZ  -- check BG 2x/day    -- Discussed diagnosis of DM, A1c goals, progression of disease, long term complications and tx options.      2. Coronary artery disease involving native coronary artery of native heart without angina pectoris  -- avoid hypoglycemia and optimize DM control  -- follows with cardiology   3. Hypertension  -- actively managed by cardiology  -- continue current    4. Hypercholesterolemia  --  uncontrolled with elevated TRG  -- optimize glycemic control  -- continue pravastatin   5. Morbid obesity with BMI of 40.0-44.9, adult  -- increasing insulin resistance.  There is no height or weight on file to calculate BMI.        FOLLOW UP  Follow up in about 2 months (around 8/10/2020).   Patient instructed to bring BG logs to each follow up   Patient encouraged to call for any BG/medication issues, concerns, or questions.    Orders Placed This Encounter   Procedures    Hemoglobin A1C    Comprehensive metabolic panel    Microalbumin/creatinine urine ratio

## 2020-06-24 ENCOUNTER — PATIENT OUTREACH (OUTPATIENT)
Dept: ADMINISTRATIVE | Facility: HOSPITAL | Age: 60
End: 2020-06-24

## 2020-06-24 NOTE — PROGRESS NOTES
Chart review completed 06/24/2020.  Care Everywhere updates requested and reviewed.  Immunizations reconciled. Media reviewed.     Lab [facility] and [facility] reviewed.  Message sent to patient's portal.    Health Maintenance Due   Topic Date Due    Hepatitis C Screening  1960    HIV Screening  01/22/1975    TETANUS VACCINE  01/22/1978    Pneumococcal Vaccine (Medium Risk) (1 of 1 - PPSV23) 01/22/1979    Shingles Vaccine (1 of 2) 01/22/2010    Colorectal Cancer Screening  01/22/2010    Hemoglobin A1c  05/24/2020    Foot Exam  08/22/2020

## 2020-07-08 ENCOUNTER — OFFICE VISIT (OUTPATIENT)
Dept: FAMILY MEDICINE | Facility: CLINIC | Age: 60
End: 2020-07-08
Payer: COMMERCIAL

## 2020-07-08 VITALS
HEART RATE: 88 BPM | TEMPERATURE: 98 F | WEIGHT: 275.56 LBS | BODY MASS INDEX: 40.81 KG/M2 | SYSTOLIC BLOOD PRESSURE: 135 MMHG | DIASTOLIC BLOOD PRESSURE: 70 MMHG | HEIGHT: 69 IN | OXYGEN SATURATION: 97 %

## 2020-07-08 DIAGNOSIS — E11.8 TYPE 2 DIABETES MELLITUS WITH COMPLICATION, WITHOUT LONG-TERM CURRENT USE OF INSULIN: ICD-10-CM

## 2020-07-08 DIAGNOSIS — I10 ESSENTIAL HYPERTENSION: ICD-10-CM

## 2020-07-08 DIAGNOSIS — Z11.59 ENCOUNTER FOR HEPATITIS C SCREENING TEST FOR LOW RISK PATIENT: ICD-10-CM

## 2020-07-08 DIAGNOSIS — E78.00 HYPERCHOLESTEROLEMIA: ICD-10-CM

## 2020-07-08 DIAGNOSIS — R06.00 DYSPNEA, UNSPECIFIED TYPE: Primary | ICD-10-CM

## 2020-07-08 PROCEDURE — 99214 OFFICE O/P EST MOD 30 MIN: CPT | Mod: S$GLB,,, | Performed by: INTERNAL MEDICINE

## 2020-07-08 PROCEDURE — 3008F BODY MASS INDEX DOCD: CPT | Mod: CPTII,S$GLB,, | Performed by: INTERNAL MEDICINE

## 2020-07-08 PROCEDURE — 99214 PR OFFICE/OUTPT VISIT, EST, LEVL IV, 30-39 MIN: ICD-10-PCS | Mod: S$GLB,,, | Performed by: INTERNAL MEDICINE

## 2020-07-08 PROCEDURE — 3075F PR MOST RECENT SYSTOLIC BLOOD PRESS GE 130-139MM HG: ICD-10-PCS | Mod: CPTII,S$GLB,, | Performed by: INTERNAL MEDICINE

## 2020-07-08 PROCEDURE — 99999 PR PBB SHADOW E&M-EST. PATIENT-LVL IV: CPT | Mod: PBBFAC,,, | Performed by: INTERNAL MEDICINE

## 2020-07-08 PROCEDURE — 3008F PR BODY MASS INDEX (BMI) DOCUMENTED: ICD-10-PCS | Mod: CPTII,S$GLB,, | Performed by: INTERNAL MEDICINE

## 2020-07-08 PROCEDURE — 3078F PR MOST RECENT DIASTOLIC BLOOD PRESSURE < 80 MM HG: ICD-10-PCS | Mod: CPTII,S$GLB,, | Performed by: INTERNAL MEDICINE

## 2020-07-08 PROCEDURE — 3078F DIAST BP <80 MM HG: CPT | Mod: CPTII,S$GLB,, | Performed by: INTERNAL MEDICINE

## 2020-07-08 PROCEDURE — 99999 PR PBB SHADOW E&M-EST. PATIENT-LVL IV: ICD-10-PCS | Mod: PBBFAC,,, | Performed by: INTERNAL MEDICINE

## 2020-07-08 PROCEDURE — 3075F SYST BP GE 130 - 139MM HG: CPT | Mod: CPTII,S$GLB,, | Performed by: INTERNAL MEDICINE

## 2020-07-08 PROCEDURE — 3052F HG A1C>EQUAL 8.0%<EQUAL 9.0%: CPT | Mod: CPTII,S$GLB,, | Performed by: INTERNAL MEDICINE

## 2020-07-08 PROCEDURE — 3052F PR MOST RECENT HEMOGLOBIN A1C LEVEL 8.0 - < 9.0%: ICD-10-PCS | Mod: CPTII,S$GLB,, | Performed by: INTERNAL MEDICINE

## 2020-07-08 NOTE — PROGRESS NOTES
Patient ID: Rashel Munson III is a 60 y.o. male.    Chief Complaint: Establish Care      Social Hx:  From [place]. . 1 child. Is currently not working.     PMH:   1.  Type 2 diabetes mellitus:  2.  Coronary artery disease:  Status post stents. Followed by Dr. Wheatley    3.  Hypertension:  4.  Class 3 obesity  5.  KATELYN: CPAP.     HPI:  Has had a total of 5 stents. He is still having dyspnea. Apparently had normal PFTs and had cardiac rehab. Still having dyspnea with minimal exertion. Med have been changed and he seen Cards and pulm for it. He states BP runs 110/70s. FBG have been running 180s. He is now on long term disability. He apparently did not have desats on walking. His Cards is now Dr. Wheatley. He ordered a special test (Cpet) test. He denies anxiety and depression.     A/P:  1.  Type 2 diabetes mellitus:  Most recent A1c 8.4 which is slightly above goal.  Goal less than 8 in the presence of coronary artery disease.  Tresiba recently increased.  Glimepiride started as well.  Followed by diabetes endo  2.  Hyperlipidemia:  Controlled but with elevated triglycerides February 2020.  Triglycerides likely to improve if diabetes is controlled.  3.  Hypertension: Controlled on current meds.   4.  class 3 obesity: 283 to 268 now back to 275. Not able to exercise 2/2 dyspnea. Discussed diet.   5.  Dyspnea: etiology unk. CTA negative for PE. He denies anxiety. Will need to continue f/u with Cards and pulm. See what CPET shows.     PMH:   Past Medical History:   Diagnosis Date    Angina pectoris     Anticoagulant long-term use     Arthritis     Coronary artery disease     stents, last 9/10/19    Diabetes mellitus     Hyperlipidemia     Hypertension     Mitral valve disorder     Sleep apnea     no cpap     Surg Hx:   Past Surgical History:   Procedure Laterality Date    ANGIOGRAM, CORONARY, WITH LEFT HEART CATHETERIZATION  9/10/2019    Procedure: Angiogram, Coronary, with Left Heart Cath;  Surgeon: Lennie  KAYLAN Lua MD;  Location: [facility] CATH;  Service: Cardiology;;    ANGIOGRAM, CORONARY, WITH LEFT HEART CATHETERIZATION  9/24/2019    Procedure: Angiogram, Coronary, with Left Heart Cath;  Surgeon: Lennie Lua MD;  Location: [facility] CATH;  Service: Cardiology;;    CARDIAC CATHETERIZATION  2011    COLONOSCOPY W/ POLYPECTOMY      CORONARY STENT PLACEMENT N/A 9/10/2019    Procedure: INSERTION, STENT, CORONARY ARTERY;  Surgeon: Lennie Lua MD;  Location: [facility] CATH;  Service: Cardiology;  Laterality: N/A;    CORONARY STENT PLACEMENT N/A 9/24/2019    Procedure: INSERTION, STENT, CORONARY ARTERY;  Surgeon: Lennie Lua MD;  Location: [facility]ATH;  Service: Cardiology;  Laterality: N/A;    ESOPHAGOGASTRODUODENOSCOPY (EGD) WITH DILATION      LEFT HEART CATHETERIZATION Left 9/10/2019    Procedure: Left heart cath;  Surgeon: Lennie Lua MD;  Location: [facility] CATH;  Service: Cardiology;  Laterality: Left;     Fhx:   Family History   Problem Relation Age of Onset    Hypertension Mother     Aneurysm Father     Hypertension Father     Deep vein thrombosis Father      Social Hx:   Social History     Socioeconomic History    Marital status:      Spouse name: Not on file    Number of children: Not on file    Years of education: Not on file    Highest education level: Not on file   Occupational History    Not on file   Social Needs    Financial resource strain: Not on file    Food insecurity     Worry: Not on file     Inability: Not on file    Transportation needs     Medical: Not on file     Non-medical: Not on file   Tobacco Use    Smoking status: Never Smoker    Smokeless tobacco: Never Used   Substance and Sexual Activity    Alcohol use: Yes     Comment: few times per yr    Drug use: No    Sexual activity: Not on file   Lifestyle    Physical activity     Days per week: Not on file     Minutes per session: Not on file    Stress: Not on file   Relationships    Social connections     Talks on phone:  "Not on file     Gets together: Not on file     Attends [employer] service: Not on file     Active member of club or organization: Not on file     Attends meetings of clubs or organizations: Not on file     Relationship status: Not on file   Other Topics Concern    Not on file   Social History Narrative    Not on file       Current Outpatient Medications on File Prior to Visit   Medication Sig Dispense Refill    aspirin (ECOTRIN) 81 MG EC tablet Take 81 mg by mouth once daily.       diltiaZEM (CARDIZEM CD) 180 MG 24 hr capsule Take 180 mg by mouth once daily.      glimepiride (AMARYL) 2 MG tablet Take 1 tablet (2 mg total) by mouth before breakfast. 90 tablet 3    hydrALAZINE (APRESOLINE) 100 MG tablet Take 1 tablet (100 mg total) by mouth 2 (two) times daily. 180 tablet 1    insulin degludec (TRESIBA FLEXTOUCH U-100) 100 unit/mL (3 mL) InPn Inject 36 Units into the skin once daily. May increase dose by 4 units once weekly until fasting glucoses are < 150 45 mL 3    metFORMIN (GLUCOPHAGE) 1000 MG tablet Take 1 tablet (1,000 mg total) by mouth 2 (two) times daily with meals. 180 tablet 1    nitroGLYCERIN (NITROSTAT) 0.4 MG SL tablet Place 1 tablet (0.4 mg total) under the tongue every 5 (five) minutes as needed. 25 tablet 0    pen needle, diabetic (BD ULTRA-FINE RENY PEN NEEDLE) 32 gauge x 5/32" Ndle Uses 1 daily with insulin 100 each 3    potassium chloride SA (K-DUR,KLOR-CON) 20 MEQ tablet Take 1 tablet (20 mEq total) by mouth once daily. with food. 90 tablet 1    rosuvastatin (CRESTOR) 20 MG tablet Take 20 mg by mouth once daily.      semaglutide (OZEMPIC) 1 mg/dose (2 mg/1.5 mL) PnIj Inject 1 mg into skin once weekly 6 Syringe 3    spironolactone (ALDACTONE) 25 MG tablet Take 1 tablet (25 mg total) by mouth once daily. 90 tablet 1    valsartan-hydrochlorothiazide (DIOVAN-HCT) 320-25 mg per tablet       [DISCONTINUED] metoprolol succinate (TOPROL-XL) 200 MG 24 hr tablet Take 200 mg by mouth every " evening.        Current Facility-Administered Medications on File Prior to Visit   Medication Dose Route Frequency Provider Last Rate Last Dose    sodium chloride 0.9% flush 10 mL  10 mL Intravenous PRN Lennie Lua MD         I personally reviewed past medical, family and social history.  Review of Systems   Constitutional: Negative for activity change and fever.   HENT: Negative for sore throat and trouble swallowing.    Eyes: Negative for pain and visual disturbance.   Respiratory: Positive for shortness of breath. Negative for cough and wheezing.    Cardiovascular: Negative for chest pain, palpitations and leg swelling.   Gastrointestinal: Negative for abdominal pain, blood in stool, diarrhea, nausea and vomiting.   Endocrine: Negative for cold intolerance and polyuria.   Genitourinary: Negative for decreased urine volume and dysuria.   Musculoskeletal: Negative for gait problem and neck pain.   Skin: Negative for rash.   Neurological: Negative for dizziness, syncope and light-headedness.   Psychiatric/Behavioral: Negative for dysphoric mood. The patient is not nervous/anxious.        Objective:     Vitals:    07/08/20 1200   BP: 135/70   Pulse:    Temp:         Physical Exam  Constitutional:       General: He is not in acute distress.     Appearance: He is well-developed.   HENT:      Head: Normocephalic and atraumatic.   Eyes:      Pupils: Pupils are equal, round, and reactive to light.   Neck:      Musculoskeletal: Neck supple.      Thyroid: No thyromegaly.   Cardiovascular:      Rate and Rhythm: Normal rate and regular rhythm.      Heart sounds: Normal heart sounds. No murmur. No friction rub. No gallop.    Pulmonary:      Effort: Pulmonary effort is normal. No respiratory distress.      Breath sounds: Normal breath sounds. No wheezing.   Abdominal:      General: Bowel sounds are normal. There is distension.      Palpations: Abdomen is soft.      Tenderness: There is no abdominal tenderness.   Skin:      General: Skin is warm.      Findings: No rash.   Neurological:      Mental Status: He is alert and oriented to person, place, and time.      Cranial Nerves: No cranial nerve deficit.   Psychiatric:         Behavior: Behavior normal.           Assessment/Plan   Rashel was seen today for establish care.    Diagnoses and all orders for this visit:    Dyspnea, unspecified type    Essential hypertension  -     CBC auto differential; Future    Type 2 diabetes mellitus with complication, without long-term current use of insulin    Hypercholesterolemia  -     Cancel: Lipid Panel; Future  -     Lipid Panel; Future    Encounter for hepatitis C screening test for low risk patient  -     Hepatitis C Antibody; Future    Other orders  -     Cancel: Comprehensive metabolic panel; Future        Follow up in about 1 month (around 8/8/2020) for check up dyspnea .

## 2020-07-24 RX ORDER — METFORMIN HYDROCHLORIDE 1000 MG/1
1000 TABLET ORAL 2 TIMES DAILY WITH MEALS
Qty: 180 TABLET | Refills: 3 | Status: SHIPPED | OUTPATIENT
Start: 2020-07-24 | End: 2021-11-17 | Stop reason: SDUPTHER

## 2020-08-03 ENCOUNTER — LAB VISIT (OUTPATIENT)
Dept: LAB | Facility: HOSPITAL | Age: 60
End: 2020-08-03
Attending: NURSE PRACTITIONER
Payer: COMMERCIAL

## 2020-08-03 DIAGNOSIS — E11.8 TYPE 2 DIABETES MELLITUS WITH COMPLICATION, WITHOUT LONG-TERM CURRENT USE OF INSULIN: ICD-10-CM

## 2020-08-03 DIAGNOSIS — I10 ESSENTIAL HYPERTENSION: ICD-10-CM

## 2020-08-03 DIAGNOSIS — Z11.59 ENCOUNTER FOR HEPATITIS C SCREENING TEST FOR LOW RISK PATIENT: ICD-10-CM

## 2020-08-03 PROCEDURE — 83036 HEMOGLOBIN GLYCOSYLATED A1C: CPT

## 2020-08-03 PROCEDURE — 86803 HEPATITIS C AB TEST: CPT

## 2020-08-03 PROCEDURE — 80053 COMPREHEN METABOLIC PANEL: CPT

## 2020-08-03 PROCEDURE — 85025 COMPLETE CBC W/AUTO DIFF WBC: CPT

## 2020-08-03 PROCEDURE — 36415 COLL VENOUS BLD VENIPUNCTURE: CPT | Mod: PO

## 2020-08-04 LAB
ALBUMIN SERPL BCP-MCNC: 3.9 G/DL (ref 3.5–5.2)
ALP SERPL-CCNC: 69 U/L (ref 55–135)
ALT SERPL W/O P-5'-P-CCNC: 30 U/L (ref 10–44)
ANION GAP SERPL CALC-SCNC: 11 MMOL/L (ref 8–16)
AST SERPL-CCNC: 20 U/L (ref 10–40)
BASOPHILS # BLD AUTO: 0.09 K/UL (ref 0–0.2)
BASOPHILS NFR BLD: 1.3 % (ref 0–1.9)
BILIRUB SERPL-MCNC: 0.4 MG/DL (ref 0.1–1)
BUN SERPL-MCNC: 18 MG/DL (ref 6–20)
CALCIUM SERPL-MCNC: 9.9 MG/DL (ref 8.7–10.5)
CHLORIDE SERPL-SCNC: 102 MMOL/L (ref 95–110)
CO2 SERPL-SCNC: 21 MMOL/L (ref 23–29)
CREAT SERPL-MCNC: 1.2 MG/DL (ref 0.5–1.4)
DIFFERENTIAL METHOD: ABNORMAL
EOSINOPHIL # BLD AUTO: 0.2 K/UL (ref 0–0.5)
EOSINOPHIL NFR BLD: 2.2 % (ref 0–8)
ERYTHROCYTE [DISTWIDTH] IN BLOOD BY AUTOMATED COUNT: 13.1 % (ref 11.5–14.5)
EST. GFR  (AFRICAN AMERICAN): >60 ML/MIN/1.73 M^2
EST. GFR  (NON AFRICAN AMERICAN): >60 ML/MIN/1.73 M^2
ESTIMATED AVG GLUCOSE: 194 MG/DL (ref 68–131)
GLUCOSE SERPL-MCNC: 271 MG/DL (ref 70–110)
HBA1C MFR BLD HPLC: 8.4 % (ref 4–5.6)
HCT VFR BLD AUTO: 44.3 % (ref 40–54)
HCV AB SERPL QL IA: NEGATIVE
HGB BLD-MCNC: 14.1 G/DL (ref 14–18)
IMM GRANULOCYTES # BLD AUTO: 0.16 K/UL (ref 0–0.04)
IMM GRANULOCYTES NFR BLD AUTO: 2.3 % (ref 0–0.5)
LYMPHOCYTES # BLD AUTO: 2 K/UL (ref 1–4.8)
LYMPHOCYTES NFR BLD: 29 % (ref 18–48)
MCH RBC QN AUTO: 29 PG (ref 27–31)
MCHC RBC AUTO-ENTMCNC: 31.8 G/DL (ref 32–36)
MCV RBC AUTO: 91 FL (ref 82–98)
MONOCYTES # BLD AUTO: 0.5 K/UL (ref 0.3–1)
MONOCYTES NFR BLD: 6.7 % (ref 4–15)
NEUTROPHILS # BLD AUTO: 4 K/UL (ref 1.8–7.7)
NEUTROPHILS NFR BLD: 58.5 % (ref 38–73)
NRBC BLD-RTO: 0 /100 WBC
PLATELET # BLD AUTO: 259 K/UL (ref 150–350)
PMV BLD AUTO: 10.1 FL (ref 9.2–12.9)
POTASSIUM SERPL-SCNC: 4.3 MMOL/L (ref 3.5–5.1)
PROT SERPL-MCNC: 7.3 G/DL (ref 6–8.4)
RBC # BLD AUTO: 4.87 M/UL (ref 4.6–6.2)
SODIUM SERPL-SCNC: 134 MMOL/L (ref 136–145)
WBC # BLD AUTO: 6.87 K/UL (ref 3.9–12.7)

## 2020-08-05 ENCOUNTER — PATIENT OUTREACH (OUTPATIENT)
Dept: ADMINISTRATIVE | Facility: HOSPITAL | Age: 60
End: 2020-08-05

## 2020-08-05 NOTE — PROGRESS NOTES
Chart review completed 2020.  Care Everywhere updates requested and reviewed.  Immunizations reconciled. Media reports reviewed.  Duplicate HM overrides and  orders removed.  Overdue HM topic chart audit and/or requested.  Overdue lab testing linked to upcoming lab appointments if applies.    Lab [facility], and [facility] reviewed for lab tests.  Message sent to patient's portal.    Health Maintenance Due   Topic Date Due    HIV Screening  1975    TETANUS VACCINE  1978    Pneumococcal Vaccine (Medium Risk) (1 of 1 - PPSV23) 1979    Shingles Vaccine (1 of 2) 2010    Colorectal Cancer Screening  2010    Foot Exam  2020    Eye Exam  2020

## 2020-08-06 ENCOUNTER — PATIENT OUTREACH (OUTPATIENT)
Dept: ADMINISTRATIVE | Facility: OTHER | Age: 60
End: 2020-08-06

## 2020-08-06 ENCOUNTER — TELEPHONE (OUTPATIENT)
Dept: FAMILY MEDICINE | Facility: CLINIC | Age: 60
End: 2020-08-06

## 2020-08-06 NOTE — TELEPHONE ENCOUNTER
----- Message from Monica Aguila sent at 8/6/2020  4:41 PM CDT -----  Contact: self  Type:  Patient Returning Call    Who Called:  patient  Who Left Message for Patient:  Sherrill   Does the patient know what this is regarding?:  results  Best Call Back Number: [phone]   Additional Information:  na

## 2020-08-07 NOTE — PROGRESS NOTES
Requested updates within Care Everywhere.  Patient's chart was reviewed for overdue [facility]ANG topics.  Immunizations reconciled.

## 2020-08-14 DIAGNOSIS — Z12.11 COLON CANCER SCREENING: ICD-10-CM

## 2020-08-19 ENCOUNTER — OFFICE VISIT (OUTPATIENT)
Dept: ENDOCRINOLOGY | Facility: CLINIC | Age: 60
End: 2020-08-19
Payer: COMMERCIAL

## 2020-08-19 ENCOUNTER — TELEPHONE (OUTPATIENT)
Dept: FAMILY MEDICINE | Facility: CLINIC | Age: 60
End: 2020-08-19

## 2020-08-19 ENCOUNTER — OFFICE VISIT (OUTPATIENT)
Dept: FAMILY MEDICINE | Facility: CLINIC | Age: 60
End: 2020-08-19
Payer: COMMERCIAL

## 2020-08-19 VITALS
HEIGHT: 69 IN | HEIGHT: 69 IN | OXYGEN SATURATION: 95 % | DIASTOLIC BLOOD PRESSURE: 64 MMHG | HEART RATE: 88 BPM | SYSTOLIC BLOOD PRESSURE: 134 MMHG | WEIGHT: 278 LBS | DIASTOLIC BLOOD PRESSURE: 70 MMHG | BODY MASS INDEX: 41.18 KG/M2 | SYSTOLIC BLOOD PRESSURE: 118 MMHG | WEIGHT: 278 LBS | HEART RATE: 87 BPM | BODY MASS INDEX: 41.18 KG/M2 | RESPIRATION RATE: 18 BRPM

## 2020-08-19 DIAGNOSIS — E11.8 TYPE 2 DIABETES MELLITUS WITH COMPLICATION, WITHOUT LONG-TERM CURRENT USE OF INSULIN: Primary | ICD-10-CM

## 2020-08-19 DIAGNOSIS — I10 ESSENTIAL HYPERTENSION: ICD-10-CM

## 2020-08-19 DIAGNOSIS — E66.01 MORBID OBESITY WITH BMI OF 40.0-44.9, ADULT: ICD-10-CM

## 2020-08-19 DIAGNOSIS — R06.00 DYSPNEA, UNSPECIFIED TYPE: ICD-10-CM

## 2020-08-19 DIAGNOSIS — I25.10 CORONARY ARTERY DISEASE INVOLVING NATIVE CORONARY ARTERY OF NATIVE HEART WITHOUT ANGINA PECTORIS: ICD-10-CM

## 2020-08-19 DIAGNOSIS — Z00.00 PREVENTATIVE HEALTH CARE: Primary | ICD-10-CM

## 2020-08-19 DIAGNOSIS — E78.00 HYPERCHOLESTEROLEMIA: ICD-10-CM

## 2020-08-19 PROCEDURE — 99214 OFFICE O/P EST MOD 30 MIN: CPT | Mod: S$GLB,,, | Performed by: NURSE PRACTITIONER

## 2020-08-19 PROCEDURE — 90472 TDAP VACCINE GREATER THAN OR EQUAL TO 7YO IM: ICD-10-PCS | Mod: S$GLB,,, | Performed by: NURSE PRACTITIONER

## 2020-08-19 PROCEDURE — 3075F PR MOST RECENT SYSTOLIC BLOOD PRESS GE 130-139MM HG: ICD-10-PCS | Mod: CPTII,S$GLB,, | Performed by: NURSE PRACTITIONER

## 2020-08-19 PROCEDURE — 3078F DIAST BP <80 MM HG: CPT | Mod: CPTII,S$GLB,, | Performed by: NURSE PRACTITIONER

## 2020-08-19 PROCEDURE — 90732 PPSV23 VACC 2 YRS+ SUBQ/IM: CPT | Mod: S$GLB,,, | Performed by: NURSE PRACTITIONER

## 2020-08-19 PROCEDURE — 3078F PR MOST RECENT DIASTOLIC BLOOD PRESSURE < 80 MM HG: ICD-10-PCS | Mod: CPTII,S$GLB,, | Performed by: NURSE PRACTITIONER

## 2020-08-19 PROCEDURE — 3075F SYST BP GE 130 - 139MM HG: CPT | Mod: CPTII,S$GLB,, | Performed by: NURSE PRACTITIONER

## 2020-08-19 PROCEDURE — 99214 PR OFFICE/OUTPT VISIT, EST, LEVL IV, 30-39 MIN: ICD-10-PCS | Mod: 25,S$GLB,, | Performed by: NURSE PRACTITIONER

## 2020-08-19 PROCEDURE — 3008F BODY MASS INDEX DOCD: CPT | Mod: CPTII,S$GLB,, | Performed by: NURSE PRACTITIONER

## 2020-08-19 PROCEDURE — 3074F PR MOST RECENT SYSTOLIC BLOOD PRESSURE < 130 MM HG: ICD-10-PCS | Mod: CPTII,S$GLB,, | Performed by: NURSE PRACTITIONER

## 2020-08-19 PROCEDURE — 99214 OFFICE O/P EST MOD 30 MIN: CPT | Mod: 25,S$GLB,, | Performed by: NURSE PRACTITIONER

## 2020-08-19 PROCEDURE — 90715 TDAP VACCINE 7 YRS/> IM: CPT | Mod: S$GLB,,, | Performed by: NURSE PRACTITIONER

## 2020-08-19 PROCEDURE — 3008F PR BODY MASS INDEX (BMI) DOCUMENTED: ICD-10-PCS | Mod: CPTII,S$GLB,, | Performed by: NURSE PRACTITIONER

## 2020-08-19 PROCEDURE — 90732 PNEUMOCOCCAL POLYSACCHARIDE VACCINE 23-VALENT =>2YO SQ IM: ICD-10-PCS | Mod: S$GLB,,, | Performed by: NURSE PRACTITIONER

## 2020-08-19 PROCEDURE — 3074F SYST BP LT 130 MM HG: CPT | Mod: CPTII,S$GLB,, | Performed by: NURSE PRACTITIONER

## 2020-08-19 PROCEDURE — 99999 PR PBB SHADOW E&M-EST. PATIENT-LVL IV: ICD-10-PCS | Mod: PBBFAC,,, | Performed by: NURSE PRACTITIONER

## 2020-08-19 PROCEDURE — 3052F PR MOST RECENT HEMOGLOBIN A1C LEVEL 8.0 - < 9.0%: ICD-10-PCS | Mod: CPTII,S$GLB,, | Performed by: NURSE PRACTITIONER

## 2020-08-19 PROCEDURE — 3052F HG A1C>EQUAL 8.0%<EQUAL 9.0%: CPT | Mod: CPTII,S$GLB,, | Performed by: NURSE PRACTITIONER

## 2020-08-19 PROCEDURE — 90472 IMMUNIZATION ADMIN EACH ADD: CPT | Mod: S$GLB,,, | Performed by: NURSE PRACTITIONER

## 2020-08-19 PROCEDURE — 99999 PR PBB SHADOW E&M-EST. PATIENT-LVL V: CPT | Mod: PBBFAC,,, | Performed by: NURSE PRACTITIONER

## 2020-08-19 PROCEDURE — 90715 TDAP VACCINE GREATER THAN OR EQUAL TO 7YO IM: ICD-10-PCS | Mod: S$GLB,,, | Performed by: NURSE PRACTITIONER

## 2020-08-19 PROCEDURE — 90471 IMMUNIZATION ADMIN: CPT | Mod: S$GLB,,, | Performed by: NURSE PRACTITIONER

## 2020-08-19 PROCEDURE — 90471 PNEUMOCOCCAL POLYSACCHARIDE VACCINE 23-VALENT =>2YO SQ IM: ICD-10-PCS | Mod: S$GLB,,, | Performed by: NURSE PRACTITIONER

## 2020-08-19 PROCEDURE — 99999 PR PBB SHADOW E&M-EST. PATIENT-LVL V: ICD-10-PCS | Mod: PBBFAC,,, | Performed by: NURSE PRACTITIONER

## 2020-08-19 PROCEDURE — 99214 PR OFFICE/OUTPT VISIT, EST, LEVL IV, 30-39 MIN: ICD-10-PCS | Mod: S$GLB,,, | Performed by: NURSE PRACTITIONER

## 2020-08-19 PROCEDURE — 99999 PR PBB SHADOW E&M-EST. PATIENT-LVL IV: CPT | Mod: PBBFAC,,, | Performed by: NURSE PRACTITIONER

## 2020-08-19 RX ORDER — GLIMEPIRIDE 4 MG/1
4 TABLET ORAL
Qty: 90 TABLET | Refills: 3 | Status: SHIPPED | OUTPATIENT
Start: 2020-08-19 | End: 2021-11-17 | Stop reason: SDUPTHER

## 2020-08-19 RX ORDER — INSULIN DEGLUDEC 200 U/ML
66 INJECTION, SOLUTION SUBCUTANEOUS DAILY
Qty: 36 ML | Refills: 3 | Status: SHIPPED | OUTPATIENT
Start: 2020-08-19 | End: 2022-06-17

## 2020-08-19 NOTE — PROGRESS NOTES
CC: Mr. Rashel Munson III arrives today for management of Type 2 DM and review of chronic medical conditions, as listed in the Visit Diagnosis section of this encounter.       HPI: Mr. Rashel Munson III was diagnosed with Type 2 DM in ~2010. He was diagnosed based on lab work. Initial treatment consisted of metformin. + FH of DM in maternal GM and uncles. Denies hospitalizations due to DM.   He has CAD and recently with 5 stent placements. Follows with Dr. Mccallum.     Patient was last seen by me in June over virtual visit. At this time, basal insulin dose was increased and glimepiride was added.     BG readings are checked 3x/day. Brings logs on phone with glucoses ranging 180-300s.     Hypoglycemia: No    Missing Insulin/PO medication doses: No    Exercise: No formal.     Dietary Habits: Eats 3 meals/day. Eating more fast food. Snacks on PB crackers, cheese crackers. Avoids sugary beverages.     Last DM education appointment: never    He is considering weight loss surgery.     CURRENT DIABETIC MEDS: metformin 1000 mg BID, glimepiride 2 mg daily, Ozempic 1 mg weekly, Tresiba 60 units QAM  Vial or pen: n/a  Glucometer type: [place]    Previous DM treatments:  Glyburide - discontinued when Ozempic added.     Last Eye Exam: 9/2019, Dr. Walsh's old office (can't recall provider's name). No DR  Last Podiatry Exam: n/a    REVIEW OF SYSTEMS  Constitutional: no c/o fatigue, weakness, weight loss. + 4# weight gain.  Eyes: denies visual disturbances.  Cardiac: no palpitations or chest pain.  Respiratory: denies cough. + ongoing dyspnea on exertion. Cardiology aware and had recent workup.  GI: no c/o abdominal pain or nausea. Denies h/o pancreatitis.    Skin: no lesions or rashes.  Neuro: no numbness, tingling, or parasthesias.  Endocrine: denies polyphagia, polydipsia, polyuria. Denies personal or family h/o MTC, MEN2.      Personally reviewed Past Medical, Surgical, Social History.    Vital Signs  /64   Pulse 88  "  Resp 18   Ht 5' 9" (1.753 m)   Wt 126.1 kg (278 lb)   BMI 41.05 kg/m²       Personally reviewed the below labs:    Hemoglobin A1C   Date Value Ref Range Status   08/03/2020 8.4 (H) 4.0 - 5.6 % Final     Comment:     ADA Screening Guidelines:  5.7-6.4%  Consistent with prediabetes  >or=6.5%  Consistent with diabetes  High levels of fetal hemoglobin interfere with the HbA1C  assay. Heterozygous hemoglobin variants (HbS, HgC, etc)do  not significantly interfere with this assay.   However, presence of multiple variants may affect accuracy.     02/24/2020 8.4 (A) 4.0 - 6.0 % Final   12/11/2019 6.5 (H) 4.0 - 5.6 % Final     Comment:     ADA Screening Guidelines:  5.7-6.4%  Consistent with prediabetes  >or=6.5%  Consistent with diabetes  High levels of fetal hemoglobin interfere with the HbA1C  assay. Heterozygous hemoglobin variants (HbS, HgC, etc)do  not significantly interfere with this assay.   However, presence of multiple variants may affect accuracy.         Chemistry        Component Value Date/Time     (L) 08/03/2020 1142    K 4.3 08/03/2020 1142     08/03/2020 1142    CO2 21 (L) 08/03/2020 1142    BUN 18 08/03/2020 1142    CREATININE 1.2 08/03/2020 1142     (H) 08/03/2020 1142        Component Value Date/Time    CALCIUM 9.9 08/03/2020 1142    ALKPHOS 69 08/03/2020 1142    AST 20 08/03/2020 1142    ALT 30 08/03/2020 1142    BILITOT 0.4 08/03/2020 1142    ESTGFRAFRICA >60.0 08/03/2020 1142    EGFRNONAA >60.0 08/03/2020 1142          Lab Results   Component Value Date    CHOL 109 02/24/2020    CHOL 110 (L) 09/05/2019    CHOL 110 (L) 09/05/2019     Lab Results   Component Value Date    HDL 25 (A) 02/24/2020    HDL 34 (L) 09/05/2019    HDL 34 (L) 09/05/2019     Lab Results   Component Value Date    LDLCALC 9 02/24/2020    LDLCALC 47.6 (L) 09/05/2019    LDLCALC 47.6 (L) 09/05/2019     Lab Results   Component Value Date    TRIG 377 (A) 02/24/2020    TRIG 142 09/05/2019    TRIG 142 09/05/2019 "     Lab Results   Component Value Date    CHOLHDL 30.9 09/05/2019    CHOLHDL 30.9 09/05/2019       Lab Results   Component Value Date    MICALBCREAT 18.6 08/03/2020     Lab Results   Component Value Date    TSH 0.723 09/05/2019       CrCl cannot be calculated (Patient's most recent lab result is older than the maximum 7 days allowed.).    No results found for: [id number]      PHYSICAL EXAMINATION  Constitutional: Appears well, no distress  Neck: Supple, trachea midline; no thyromegaly or nodules.   Respiratory: CTA, even and unlabored.  Cardiovascular: RRR, no murmurs, no carotid bruits. DP pulses  2+ bilaterally; no edema.    GI: bowel sounds active, no hernia noted  Skin: warm and dry; no lipohypertrophy, or acanthosis nigracans observed.  Neuro: DTR diminished to BUE/1+BLE.  Feet: appropriate footwear.    A1c target < 7%      Assessment/Plan  1. Type 2 diabetes mellitus with complication, without long-term current use of insulin  -- Uncontrolled with elevated FBG and prandial excursions. Recommended prandial insulin but patient declined today and expresses that he wants to work on diet first. We did discuss VGo as an option but he'd require VGo 40. Unsure if this would meet his needs but would be worth a try since he doesn't want multiple daily injections.   -- DM Education for dietary counseling.   -- increase glimepiride to 4 mg daily  -- increase Tresiba to 66 units QAM.   -- continue Ozempic, metformin  -- check BG 2x/day      -- Discussed diagnosis of DM, A1c goals, progression of disease, long term complications and tx options.      2. Coronary artery disease involving native coronary artery of native heart without angina pectoris  -- avoid hypoglycemia and optimize DM control  -- follows with cardiology   3. Hypertension  -- controlled  -- continue current meds. Managed by cardiology   4. Hypercholesterolemia  -- uncontrolled with elevated TRG  -- optimize glycemic control  -- continue pravastatin  --  lipid panel already ordered by PCP   5. Morbid obesity with BMI of 40.0-44.9, adult  -- Referral to Bariatrics  -- increasing insulin resistance.  Body mass index is 41.05 kg/m².        FOLLOW UP  Follow up in about 3 months (around 11/19/2020).   Patient instructed to bring BG logs to each follow up   Patient encouraged to call for any BG/medication issues, concerns, or questions.    Orders Placed This Encounter   Procedures    Hemoglobin A1C    Comprehensive metabolic panel    Ambulatory referral/consult to Diabetes Education    Ambulatory referral/consult to Bariatric Surgery

## 2020-08-19 NOTE — PROGRESS NOTES
Subjective:       Patient ID: Rashel Munson III is a 60 y.o. male.    Chief Complaint: Shortness of Breath    Patient is here for follow up from establishing care with PCP last month. He is seeing cardiology, has had ongoing dyspnea since getting cardiac stents placed in September/October/December 2019, then again in March.  Has had significant cardiac work up, has seen pulmonology. Has had PFTs which showed no abnormality. Cardiology did a CPET (coronary Pulmonary Exercise Test) recently, cardiac portion of the testing was ok, there is some indication of restriction of his lungs.   He saw endocrinology today to evaluate and treat with his uncontrolled diabetes.     Past Medical History:  No date: Angina pectoris  No date: Anticoagulant long-term use  No date: Arthritis  No date: Coronary artery disease      Comment:  stents, last 9/10/19  No date: Diabetes mellitus  No date: Hyperlipidemia  No date: Hypertension  No date: Mitral valve disorder  No date: Sleep apnea      Comment:  no cpap    Past Surgical History:  9/10/2019: ANGIOGRAM, CORONARY, WITH LEFT HEART CATHETERIZATION      Comment:  Procedure: Angiogram, Coronary, with Left Heart Cath;                 Surgeon: Lennie Lua MD;  [facility];                 Service: Cardiology;;  9/24/2019: ANGIOGRAM, CORONARY, WITH LEFT HEART CATHETERIZATION      Comment:  Procedure: Angiogram, Coronary, with Left Heart Cath;                 Surgeon: Lennie Lua MD;  Location: [facility];                 Service: Cardiology;;  2011: CARDIAC CATHETERIZATION  No date: COLONOSCOPY W/ POLYPECTOMY  9/10/2019: CORONARY STENT PLACEMENT; N/A      Comment:  Procedure: INSERTION, STENT, CORONARY ARTERY;  Surgeon:                Lennie Lua MD;  Location: [facility] CATH;  Service:                Cardiology;  Laterality: N/A;  9/24/2019: CORONARY STENT PLACEMENT; N/A      Comment:  Procedure: INSERTION, STENT, CORONARY ARTERY;  Surgeon:                Lennie Lua MD;   Location: [facility] CATH;  Service:                Cardiology;  Laterality: N/A;  No date: ESOPHAGOGASTRODUODENOSCOPY (EGD) WITH DILATION  9/10/2019: LEFT HEART CATHETERIZATION; Left      Comment:  Procedure: Left heart cath;  Surgeon: Lennie Lua MD;               Location: [facility] CATH;  Service: Cardiology;  Laterality:                Left;    Review of patient's family history indicates:  Problem: Hypertension      Relation: Mother          Age of Onset: (Not Specified)  Problem: Aneurysm      Relation: Father          Age of Onset: (Not Specified)  Problem: Hypertension      Relation: Father          Age of Onset: (Not Specified)  Problem: Deep vein thrombosis      Relation: Father          Age of Onset: (Not Specified)      Social History    Socioeconomic History      Marital status:       Spouse name: Not on file      Number of children: Not on file      Years of education: Not on file      Highest education level: Not on file    Occupational History      Not on file    Social Needs      Financial resource strain: Not hard at all      Food insecurity        Worry: Never true        Inability: Never true      Transportation needs        Medical: No        Non-medical: No    Tobacco Use      Smoking status: Never Smoker      Smokeless tobacco: Never Used    Substance and Sexual Activity      Alcohol use: Yes        Frequency: 2-4 times a month        Drinks per session: 1 or 2        Binge frequency: Never        Comment: few times per yr      Drug use: No      Sexual activity: Not on file    Lifestyle      Physical activity        Days per week: 0 days        Minutes per session: 0 min      Stress: Not at all    Relationships      Social connections        Talks on phone: More than three times a week        Gets together: Once a week        Attends [employer]od[employer] service: Not on file        Active member of club or organization: No        Attends meetings of clubs or organizations: Never        Relationship  "status:     Other Topics      Concerns:        Not on file    Social History Narrative      Not on file      Current Outpatient Medications:  aspirin (ECOTRIN) 81 MG EC tablet, Take 81 mg by mouth once daily. , Disp: , Rfl:   diltiaZEM (CARDIZEM CD) 180 MG 24 hr capsule, Take 180 mg by mouth once daily., Disp: , Rfl:   glimepiride (AMARYL) 4 MG tablet, Take 1 tablet (4 mg total) by mouth before breakfast., Disp: 90 tablet, Rfl: 3  hydrALAZINE (APRESOLINE) 100 MG tablet, Take 1 tablet (100 mg total) by mouth 2 (two) times daily., Disp: 180 tablet, Rfl: 1  insulin degludec (TRESIBA FLEXTOUCH U-200) 200 unit/mL (3 mL) InPn, Inject 66 Units into the skin once daily., Disp: 36 mL, Rfl: 3  metFORMIN (GLUCOPHAGE) 1000 MG tablet, Take 1 tablet (1,000 mg total) by mouth 2 (two) times daily with meals., Disp: 180 tablet, Rfl: 3  nitroGLYCERIN (NITROSTAT) 0.4 MG SL tablet, Place 1 tablet (0.4 mg total) under the tongue every 5 (five) minutes as needed., Disp: 25 tablet, Rfl: 0  pen needle, diabetic (BD ULTRA-FINE RENY PEN NEEDLE) 32 gauge x 5/32" Ndle, Uses 1 daily with insulin, Disp: 100 each, Rfl: 3  potassium chloride SA (K-DUR,KLOR-CON) 20 MEQ tablet, Take 1 tablet (20 mEq total) by mouth once daily. with food., Disp: 90 tablet, Rfl: 1  rosuvastatin (CRESTOR) 20 MG tablet, Take 20 mg by mouth once daily., Disp: , Rfl:   semaglutide (OZEMPIC) 1 mg/dose (2 mg/1.5 mL) PnIj, Inject 1 mg into skin once weekly, Disp: 6 Syringe, Rfl: 3  spironolactone (ALDACTONE) 25 MG tablet, Take 1 tablet (25 mg total) by mouth once daily., Disp: 90 tablet, Rfl: 1  valsartan-hydrochlorothiazide (DIOVAN-HCT) 320-25 mg per tablet, , Disp: , Rfl:     Current Facility-Administered Medications:  sodium chloride 0.9% flush 10 mL, 10 mL, Intravenous, PRN, Lennie Lua MD         Review of patient's allergies indicates:  No Known Allergies      Review of Systems   Constitutional: Positive for fatigue.   Respiratory: Positive for shortness of " breath. Negative for cough and wheezing.    Cardiovascular: Negative for chest pain, palpitations and leg swelling.   Gastrointestinal: Negative.  Negative for abdominal pain, constipation, diarrhea, nausea and vomiting.   Genitourinary: Negative.    Neurological: Negative for dizziness, weakness, light-headedness and headaches.   Psychiatric/Behavioral: The patient is not nervous/anxious.          Objective:      Physical Exam  Constitutional:       General: He is not in acute distress.     Appearance: He is not ill-appearing.   HENT:      Head: Normocephalic and atraumatic.   Cardiovascular:      Rate and Rhythm: Normal rate and regular rhythm.   Pulmonary:      Effort: Pulmonary effort is normal.      Breath sounds: Normal breath sounds.   Neurological:      General: No focal deficit present.      Mental Status: He is oriented to person, place, and time.   Psychiatric:         Mood and Affect: Mood normal.         Behavior: Behavior normal.         Assessment:       1. Preventative health care    2. Dyspnea, unspecified type        Plan:       1. Preventative health care  Reviewed at visit. Follow up with PCP in 3 months.  - (In Office Administered) Pneumococcal Polysaccharide Vaccine (23 Valent) (SQ/IM)  - Tdap Vaccine  - HIV 1/2 Ag/Ab (4th Gen); Future    2. Dyspnea, unspecified type  Advised patient to follow up with pulmonology to review results of the CPET. Weight loss discussed. Continue follow up with cardiology and endocrinology.   - Ambulatory referral/consult to Pulmonology; Future

## 2020-08-19 NOTE — TELEPHONE ENCOUNTER
----- Message from Irma Dawson MA sent at 8/19/2020  8:49 AM CDT -----  Type:  Patient Returning Call    Who Called:  Rashel  Who Left Message for Patient:  Elisa  Does the patient know what this is regarding?:  unknown  Best Call Back Number:  [phone]  Additional Information:

## 2020-09-01 ENCOUNTER — CLINICAL SUPPORT (OUTPATIENT)
Dept: DIABETES | Facility: CLINIC | Age: 60
End: 2020-09-01
Payer: COMMERCIAL

## 2020-09-01 VITALS — WEIGHT: 280.63 LBS | BODY MASS INDEX: 41.56 KG/M2 | HEIGHT: 69 IN

## 2020-09-01 DIAGNOSIS — E11.8 TYPE 2 DIABETES MELLITUS WITH COMPLICATION, WITHOUT LONG-TERM CURRENT USE OF INSULIN: ICD-10-CM

## 2020-09-01 PROCEDURE — 99999 PR PBB SHADOW E&M-EST. PATIENT-LVL II: CPT | Mod: PBBFAC,,, | Performed by: DIETITIAN, REGISTERED

## 2020-09-01 PROCEDURE — G0108 PR DIAB MANAGE TRN  PER INDIV: ICD-10-PCS | Mod: S$GLB,,, | Performed by: DIETITIAN, REGISTERED

## 2020-09-01 PROCEDURE — G0108 DIAB MANAGE TRN  PER INDIV: HCPCS | Mod: S$GLB,,, | Performed by: DIETITIAN, REGISTERED

## 2020-09-01 PROCEDURE — 99999 PR PBB SHADOW E&M-EST. PATIENT-LVL II: ICD-10-PCS | Mod: PBBFAC,,, | Performed by: DIETITIAN, REGISTERED

## 2020-09-01 NOTE — PROGRESS NOTES
Diabetes Education  Author: Taylor Olivas RD, CDE  Date: 9/1/2020    Diabetes Care Management Summary  Diabetes Education Record Assessment/Progress: Initial  Current Diabetes Risk Level: Moderate     Last A1c:   Lab Results   Component Value Date    HGBA1C 8.4 (H) 08/03/2020     Last visit with Diabetes Educator: : 09/01/2020    Diabetes Type  Diabetes Type : Type II    Diabetes History  Diabetes Diagnosis: >10 years  Current Treatment: Oral Medication, Insulin, Injectable(Ozempic 1mg dose weekly (Sundays), glimepiride 4mg once daily at breakfast, metformin 1000 mg BID, Tresiba 66 units QAM)  Reviewed Problem List with Patient: Yes    Health Maintenance was reviewed today with patient. Discussed with patient importance of routine eye exams, foot exams/foot care, blood work (i.e.: A1c, microalbumin, and lipid), dental visits, yearly flu vaccine, and pneumonia vaccine as indicated by PCP. Patient verbalized understanding.     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 Increased portions when dining out.  Avoids regularly sweetened sodas.)  What type of beverages do you drink?: diet soda/tea, water  Meal Plan 24 Hour Recall - Breakfast: 2 slices toast with jelly, 16 fl oz milk OR eggs (2), sausage, toast, milk  Meal Plan 24 Hour Recall - Lunch: Maribell Dawson: double stack hanburger, French fries, diet coke OR chicken leg with 2 slices of bread  Meal  Plan 24 Hour Recall - Dinner: fried catfish with shrimp etouffee over rice (large portion), diet coke  Meal Plan 24 Hour Recall - Snack: peanut butter crackers (4 or 6 pack)    Monitoring   Monitoring: Other([employer] glucometer)  Self Monitoring : Checks 2 times day at various times--mostly fasting in the morning and before meals.  Blood Glucose Logs: Yes(No written logs--reviewed on phone carolyn.  Glucose levels ranging 150-250 mg/dL--no hypoglycemia and one episode glucose > 300 mg/dL over past 2 weeks.)  Do you use a personal continuous glucose monitor?: No  In the last month, how often have you had a low blood sugar reaction?: never  Can you tell when your blood sugar is too high?: no    Exercise   Exercise Type: none(No structured exercise--increased SOB with exertion--states has had testing and cannot determine why increased SOB.)    Current Diabetes Treatment   Current Treatment: Oral Medication, Insulin, Injectable(Ozempic 1mg dose weekly (Sundays), glimepiride 4mg once daily at breakfast, metformin 1000 mg BID, Tresiba 66 units QAM)    Social History  Preferred Learning Method: Face to Face  Primary Support: Self, Spouse(Spouse is here with patient today for appt)  Smoking Status: Never a Smoker    Barriers to Change  Barriers to Change: None  Learning Challenges : None    Readiness to Learn   Readiness to Learn : Acceptance    Cultural Influences  Cultural Influences: None    Diabetes Education Assessment/Progress  Diabetes Disease Process (diabetes disease process and treatment options): Discussion, Comprehends Key Points.  Discussed worsening Hgb A1c over the past year.  Hgb A1c now 8.4% (8/3/2020)--discussed goal Hgb A1c and goal glucose readings as patient checks glucose at home.    Nutrition (Incorporating nutritional management into one's lifestyle): Discussion, Demonstration, Demonstrates Understanding/Competency (verbalizes/demonstrates), Written Materials Provided. Reviewed basic food groups with  "patient (carbohydrate, protein, and fat).   Carbohydrate sources reviewed in detail.  Typical food intake obtained from patient.  Practiced reading food labels with patient focusing on serving size and total carbohydrate intake (not sugar intake). Discussed telephone apps (AIRTAME, [employer]eloWeb) that patient can download to smart phone to better determine carbohydrate content of foods when dining out.  Practiced meal planning with foods typically eaten to promote balanced eating.  Discussed portion sizes.  Patient encouraged to measure food portions or can limit carbohydrate portions at meals to a "fistful".  Discussed having lean protein at all meals and to also add non starchy vegetables at lunch and dinner.  Written education information provided to patient for use at home.      Physical Activity (incorporating physical activity into one's lifestyle): Discussion, Comprehends Key Points.  Limited due to SOB on exertion.  Is considering getting recumbent bicycle or joining a gym with his spouse.    Medications (states correct name, dose, onset, peak, duration, side effects & timing of meds): Discussion, Comprehends Key Points.  Patient reports compliance with diabetic medication as prescribed.  Wants to avoid prandial insulin.    Monitoring (monitoring blood glucose/other parameters & using results): Discussion, Instructed, Demonstrates Understanding/Competency (verbalizes/demonstrates).  Instructed patient to check glucose 2 times daily--fasting in AM and alternating before dinner or at bedtime to get a better understanding of glucose patterns.  Will bring glucose log to 4-6 week follow up with DM education.    Acute Complications (preventing, detecting, and treating acute complications): Discussion, Demonstrates Understanding/Competency (verbalizes/demonstrates). Reviewed signs and symptoms of hypoglycemia (glucose < 70) and proper methods to treat hypoglycemic events if occur.  15-15 rule: patient to eat 15 " gm simple, concentrated CHO (such as 4 glucose tablets, 4 oz fruit juice/regular soda, or 1 Tbsp honey/sugar) and wait 15 minutes and recheck home glucose.  Written information provided to patient.  Patient to call if more than 2 hypoglycemic events occur.      Chronic Complications (preventing, detecting, and treating chronic complications): Discussion, Comprehends Key Points.  Discussed long term complications of uncontrolled diabetes.    Clinical (diabetes, other pertinent medical history, and relevant comorbidities reviewed during visit): Discussion, Comprehends Key Points.  History of obstructive sleep apnea, s/p stent coronary artery, CAD, hypercholesterolemia, essential hypertension, obesity    Cognitive (knowledge of self-management skills, functional health literacy): Discussion, Demonstrates Understanding/Competency (verbalizes/demonstrates)    Psychosocial (emotional response to diabetes): Discussion, Demonstrates Understanding/Competency (verbalizes/demonstrates).  Patient motivated to get his diabetes under better control.      Diabetes Distress and Support Systems: Discussion, Comprehends Key Points.  Has good family support--spouse is a type 2 diabetic as well and wants to make changes together with patient.    Behavioral (readiness for change, lifestyle practices, self-care behaviors): Discussion, Comprehends Key Points.  Will start with decreasing portions of CHO at meals (CHO budget given for meals and snacks).    Goals  Patient has selected/evaluated goals during today's session: Yes, selected  Healthy Eating: Set(Decrease portions of CHO at meals--asdd lean protein and non starchy vegetables to increase satiety.)  Start Date: 09/01/20  Target Date: 10/12/20  Monitoring: In Progress  Medications: In Progress     Diabetes Care Plan/Intervention  Education Plan/Intervention: Individual Follow-Up DSMT  1.  Decrease CHO portions at meals and snacks (CHO budget provided).  Add lean protein and  non-starchy vegetables at meals to increase satiety.  2.  Better snack options--will decrease quantity of peanut butter crackers eaten for snacks or choose low/no CHO snack options (handful of nuts).  3.  Gradual weight loss--would benefit from 5-10 lb gradual weight loss.    4.  Increase exercise--limited with shortness of breath but patient states he is cleared to exercise.  Patient and spouse considering getting a recumbent bicycle or joining a local gym.   5.  Continue checking glucose levels 2 times daily--check glucose BEFORE meals or at bedtime to get a better understanding of glucose patterns.    6.  Return in 4-6 weeks for glucose log review and diet review.  Appt made for 10/12/2020.      Diabetes Meal Plan  Restrictions: Restricted Carbohydrate  Carbohydrate Per Meal: 45-60g  Carbohydrate Per Snack : 15-20g    Today's Self-Management Care Plan was developed with the patient's input and is based on barriers identified during today's assessment.    The long and short-term goals in the care plan were written with the patient/caregiver's input. The patient has agreed to work toward these goals to improve his overall diabetes control.      The patient received a copy of today's self-management plan and verbalized understanding of the care plan, goals, and all of today's instructions.      The patient was encouraged to communicate with his physician and care team regarding his condition(s) and treatment.  I provided the patient with my contact information today and encouraged him to contact me via phone or patient portal as needed.     Education Units of Time   Time Spent: 60 min

## 2020-09-20 ENCOUNTER — PATIENT MESSAGE (OUTPATIENT)
Dept: FAMILY MEDICINE | Facility: CLINIC | Age: 60
End: 2020-09-20

## 2020-09-20 DIAGNOSIS — U07.1 COVID-19 VIRUS DETECTED: Primary | ICD-10-CM

## 2020-09-25 ENCOUNTER — PATIENT MESSAGE (OUTPATIENT)
Dept: BARIATRICS | Facility: CLINIC | Age: 60
End: 2020-09-25

## 2020-10-30 ENCOUNTER — PATIENT MESSAGE (OUTPATIENT)
Dept: ADMINISTRATIVE | Facility: HOSPITAL | Age: 60
End: 2020-10-30

## 2020-11-09 LAB — HBA1C MFR BLD: 8.4 % (ref 4–6)

## 2020-11-24 DIAGNOSIS — E11.9 TYPE 2 DIABETES MELLITUS WITHOUT COMPLICATION, UNSPECIFIED WHETHER LONG TERM INSULIN USE: ICD-10-CM

## 2020-12-04 ENCOUNTER — PATIENT OUTREACH (OUTPATIENT)
Dept: ADMINISTRATIVE | Facility: HOSPITAL | Age: 60
End: 2020-12-04

## 2020-12-04 NOTE — PROGRESS NOTES
2020 Care Everywhere updates requested and reviewed.  Immunizations reconciled. Media reports reviewed.  Duplicate HM overrides and  orders removed.  Overdue HM topic chart audit and/or requested.  Overdue lab testing linked to upcoming lab appointments if applies.    Lab сергей, and Geosho reviewed.   Message sent to patient's portal.    Health Maintenance Due   Topic Date Due    HIV Screening  1975    Shingles Vaccine (1 of 2) 2010    Colorectal Cancer Screening  2010    Foot Exam  2020    Eye Exam  2020    Hemoglobin A1c  2020

## 2021-01-01 NOTE — PATIENT INSTRUCTIONS
Start Ozempic 0.25 mg weekly. After 4 weeks, increase dose to 0.5 mg weekly.     Stop glyburide once you increase Ozempic to 0.5 mg weekly.   
[email or web address]

## 2021-01-04 ENCOUNTER — PATIENT MESSAGE (OUTPATIENT)
Dept: ADMINISTRATIVE | Facility: HOSPITAL | Age: 61
End: 2021-01-04

## 2021-02-02 ENCOUNTER — PATIENT MESSAGE (OUTPATIENT)
Dept: ADMINISTRATIVE | Facility: HOSPITAL | Age: 61
End: 2021-02-02

## 2021-04-06 ENCOUNTER — PATIENT MESSAGE (OUTPATIENT)
Dept: ADMINISTRATIVE | Facility: HOSPITAL | Age: 61
End: 2021-04-06

## 2021-05-12 ENCOUNTER — PATIENT MESSAGE (OUTPATIENT)
Dept: RESEARCH | Facility: HOSPITAL | Age: 61
End: 2021-05-12

## 2021-06-22 ENCOUNTER — PATIENT MESSAGE (OUTPATIENT)
Dept: ADMINISTRATIVE | Facility: HOSPITAL | Age: 61
End: 2021-06-22

## 2021-06-22 ENCOUNTER — PATIENT OUTREACH (OUTPATIENT)
Dept: ADMINISTRATIVE | Facility: HOSPITAL | Age: 61
End: 2021-06-22

## 2021-07-07 ENCOUNTER — PATIENT MESSAGE (OUTPATIENT)
Dept: ADMINISTRATIVE | Facility: HOSPITAL | Age: 61
End: 2021-07-07

## 2021-07-13 ENCOUNTER — PATIENT OUTREACH (OUTPATIENT)
Dept: ADMINISTRATIVE | Facility: HOSPITAL | Age: 61
End: 2021-07-13

## 2021-07-30 ENCOUNTER — PATIENT OUTREACH (OUTPATIENT)
Dept: ADMINISTRATIVE | Facility: HOSPITAL | Age: 61
End: 2021-07-30

## 2021-08-04 ENCOUNTER — PATIENT MESSAGE (OUTPATIENT)
Dept: ADMINISTRATIVE | Facility: HOSPITAL | Age: 61
End: 2021-08-04

## 2021-09-30 ENCOUNTER — TELEPHONE (OUTPATIENT)
Dept: FAMILY MEDICINE | Facility: CLINIC | Age: 61
End: 2021-09-30

## 2021-09-30 ENCOUNTER — PATIENT OUTREACH (OUTPATIENT)
Dept: ADMINISTRATIVE | Facility: HOSPITAL | Age: 61
End: 2021-09-30

## 2021-11-17 ENCOUNTER — OFFICE VISIT (OUTPATIENT)
Dept: ENDOCRINOLOGY | Facility: CLINIC | Age: 61
End: 2021-11-17
Payer: COMMERCIAL

## 2021-11-17 ENCOUNTER — PATIENT MESSAGE (OUTPATIENT)
Dept: ENDOCRINOLOGY | Facility: CLINIC | Age: 61
End: 2021-11-17

## 2021-11-17 DIAGNOSIS — E78.00 HYPERCHOLESTEROLEMIA: ICD-10-CM

## 2021-11-17 DIAGNOSIS — I25.10 CORONARY ARTERY DISEASE INVOLVING NATIVE CORONARY ARTERY OF NATIVE HEART WITHOUT ANGINA PECTORIS: ICD-10-CM

## 2021-11-17 DIAGNOSIS — I10 ESSENTIAL HYPERTENSION: ICD-10-CM

## 2021-11-17 DIAGNOSIS — E66.01 MORBID OBESITY WITH BMI OF 40.0-44.9, ADULT: ICD-10-CM

## 2021-11-17 DIAGNOSIS — E11.8 TYPE 2 DIABETES MELLITUS WITH COMPLICATION, WITHOUT LONG-TERM CURRENT USE OF INSULIN: Primary | ICD-10-CM

## 2021-11-17 PROCEDURE — 1159F PR MEDICATION LIST DOCUMENTED IN MEDICAL RECORD: ICD-10-PCS | Mod: CPTII,95,, | Performed by: NURSE PRACTITIONER

## 2021-11-17 PROCEDURE — 1159F MED LIST DOCD IN RCRD: CPT | Mod: CPTII,95,, | Performed by: NURSE PRACTITIONER

## 2021-11-17 PROCEDURE — 99214 PR OFFICE/OUTPT VISIT, EST, LEVL IV, 30-39 MIN: ICD-10-PCS | Mod: 95,,, | Performed by: NURSE PRACTITIONER

## 2021-11-17 PROCEDURE — 1160F PR REVIEW ALL MEDS BY PRESCRIBER/CLIN PHARMACIST DOCUMENTED: ICD-10-PCS | Mod: CPTII,95,, | Performed by: NURSE PRACTITIONER

## 2021-11-17 PROCEDURE — 1160F RVW MEDS BY RX/DR IN RCRD: CPT | Mod: CPTII,95,, | Performed by: NURSE PRACTITIONER

## 2021-11-17 PROCEDURE — 99214 OFFICE O/P EST MOD 30 MIN: CPT | Mod: 95,,, | Performed by: NURSE PRACTITIONER

## 2021-11-17 RX ORDER — EMPAGLIFLOZIN 10 MG/1
10 TABLET, FILM COATED ORAL DAILY
Qty: 30 TABLET | Refills: 11 | Status: CANCELLED | OUTPATIENT
Start: 2021-11-17

## 2021-11-17 RX ORDER — METOPROLOL TARTRATE 100 MG/1
100 TABLET ORAL 2 TIMES DAILY
COMMUNITY
End: 2021-12-09

## 2021-11-17 RX ORDER — PEN NEEDLE, DIABETIC 30 GX3/16"
NEEDLE, DISPOSABLE MISCELLANEOUS
Qty: 90 EACH | Refills: 3 | Status: SHIPPED | OUTPATIENT
Start: 2021-11-17 | End: 2022-06-17 | Stop reason: SDUPTHER

## 2021-11-17 RX ORDER — METFORMIN HYDROCHLORIDE 1000 MG/1
1000 TABLET ORAL 2 TIMES DAILY WITH MEALS
Qty: 180 TABLET | Refills: 3 | Status: SHIPPED | OUTPATIENT
Start: 2021-11-17 | End: 2022-12-13

## 2021-11-17 RX ORDER — SEMAGLUTIDE 1.34 MG/ML
1 INJECTION, SOLUTION SUBCUTANEOUS
Qty: 3 PEN | Refills: 3 | Status: SHIPPED | OUTPATIENT
Start: 2021-11-17 | End: 2022-09-20

## 2021-11-17 RX ORDER — GLIMEPIRIDE 4 MG/1
4 TABLET ORAL
Qty: 90 TABLET | Refills: 3 | Status: SHIPPED | OUTPATIENT
Start: 2021-11-17 | End: 2022-03-18 | Stop reason: SDUPTHER

## 2021-11-21 ENCOUNTER — TELEPHONE (OUTPATIENT)
Dept: FAMILY MEDICINE | Facility: CLINIC | Age: 61
End: 2021-11-21
Payer: COMMERCIAL

## 2021-11-21 DIAGNOSIS — I10 ESSENTIAL HYPERTENSION: Primary | ICD-10-CM

## 2021-11-21 DIAGNOSIS — Z12.5 SCREENING FOR PROSTATE CANCER: ICD-10-CM

## 2021-11-26 ENCOUNTER — PATIENT OUTREACH (OUTPATIENT)
Dept: ADMINISTRATIVE | Facility: HOSPITAL | Age: 61
End: 2021-11-26
Payer: COMMERCIAL

## 2021-12-02 ENCOUNTER — LAB VISIT (OUTPATIENT)
Dept: LAB | Facility: HOSPITAL | Age: 61
End: 2021-12-02
Attending: NURSE PRACTITIONER
Payer: COMMERCIAL

## 2021-12-02 DIAGNOSIS — E11.8 TYPE 2 DIABETES MELLITUS WITH COMPLICATION, WITHOUT LONG-TERM CURRENT USE OF INSULIN: ICD-10-CM

## 2021-12-02 LAB
ALBUMIN/CREAT UR: 4.7 UG/MG (ref 0–30)
CREAT UR-MCNC: 171 MG/DL (ref 23–375)
MICROALBUMIN UR DL<=1MG/L-MCNC: 8 UG/ML

## 2021-12-02 PROCEDURE — 82570 ASSAY OF URINE CREATININE: CPT | Performed by: NURSE PRACTITIONER

## 2021-12-03 ENCOUNTER — PATIENT MESSAGE (OUTPATIENT)
Dept: ENDOCRINOLOGY | Facility: CLINIC | Age: 61
End: 2021-12-03
Payer: COMMERCIAL

## 2021-12-03 DIAGNOSIS — E11.8 TYPE 2 DIABETES MELLITUS WITH COMPLICATION, WITHOUT LONG-TERM CURRENT USE OF INSULIN: Primary | ICD-10-CM

## 2021-12-03 RX ORDER — EMPAGLIFLOZIN 10 MG/1
10 TABLET, FILM COATED ORAL DAILY
Qty: 90 TABLET | Refills: 1 | Status: SHIPPED | OUTPATIENT
Start: 2021-12-03 | End: 2022-06-17

## 2021-12-09 ENCOUNTER — CLINICAL SUPPORT (OUTPATIENT)
Dept: FAMILY MEDICINE | Facility: CLINIC | Age: 61
End: 2021-12-09
Payer: COMMERCIAL

## 2021-12-09 ENCOUNTER — OFFICE VISIT (OUTPATIENT)
Dept: FAMILY MEDICINE | Facility: CLINIC | Age: 61
End: 2021-12-09
Payer: COMMERCIAL

## 2021-12-09 VITALS
HEART RATE: 95 BPM | HEIGHT: 69 IN | SYSTOLIC BLOOD PRESSURE: 136 MMHG | DIASTOLIC BLOOD PRESSURE: 80 MMHG | WEIGHT: 279.75 LBS | BODY MASS INDEX: 41.43 KG/M2 | OXYGEN SATURATION: 96 %

## 2021-12-09 DIAGNOSIS — E78.1 HYPERTRIGLYCERIDEMIA: ICD-10-CM

## 2021-12-09 DIAGNOSIS — I25.118 CORONARY ARTERY DISEASE INVOLVING NATIVE CORONARY ARTERY OF NATIVE HEART WITH OTHER FORM OF ANGINA PECTORIS: ICD-10-CM

## 2021-12-09 DIAGNOSIS — E11.8 TYPE 2 DIABETES MELLITUS WITH COMPLICATION, WITHOUT LONG-TERM CURRENT USE OF INSULIN: ICD-10-CM

## 2021-12-09 DIAGNOSIS — E66.01 MORBID OBESITY WITH BMI OF 40.0-44.9, ADULT: ICD-10-CM

## 2021-12-09 DIAGNOSIS — I10 ESSENTIAL HYPERTENSION: Primary | ICD-10-CM

## 2021-12-09 DIAGNOSIS — G47.33 OBSTRUCTIVE SLEEP APNEA: ICD-10-CM

## 2021-12-09 DIAGNOSIS — E78.00 HYPERCHOLESTEROLEMIA: ICD-10-CM

## 2021-12-09 PROCEDURE — 90686 IIV4 VACC NO PRSV 0.5 ML IM: CPT | Mod: S$GLB,,, | Performed by: INTERNAL MEDICINE

## 2021-12-09 PROCEDURE — 90471 FLU VACCINE (QUAD) GREATER THAN OR EQUAL TO 3YO PRESERVATIVE FREE IM: ICD-10-PCS | Mod: S$GLB,,, | Performed by: INTERNAL MEDICINE

## 2021-12-09 PROCEDURE — 90471 IMMUNIZATION ADMIN: CPT | Mod: S$GLB,,, | Performed by: INTERNAL MEDICINE

## 2021-12-09 PROCEDURE — 99999 PR PBB SHADOW E&M-EST. PATIENT-LVL V: ICD-10-PCS | Mod: PBBFAC,,, | Performed by: INTERNAL MEDICINE

## 2021-12-09 PROCEDURE — 90686 FLU VACCINE (QUAD) GREATER THAN OR EQUAL TO 3YO PRESERVATIVE FREE IM: ICD-10-PCS | Mod: S$GLB,,, | Performed by: INTERNAL MEDICINE

## 2021-12-09 PROCEDURE — 99396 PR PREVENTIVE VISIT,EST,40-64: ICD-10-PCS | Mod: 25,S$GLB,, | Performed by: INTERNAL MEDICINE

## 2021-12-09 PROCEDURE — 99396 PREV VISIT EST AGE 40-64: CPT | Mod: 25,S$GLB,, | Performed by: INTERNAL MEDICINE

## 2021-12-09 PROCEDURE — 3061F PR NEG MICROALBUMINURIA RESULT DOCUMENTED/REVIEW: ICD-10-PCS | Mod: CPTII,S$GLB,, | Performed by: INTERNAL MEDICINE

## 2021-12-09 PROCEDURE — 3061F NEG MICROALBUMINURIA REV: CPT | Mod: CPTII,S$GLB,, | Performed by: INTERNAL MEDICINE

## 2021-12-09 PROCEDURE — 99999 PR PBB SHADOW E&M-EST. PATIENT-LVL V: CPT | Mod: PBBFAC,,, | Performed by: INTERNAL MEDICINE

## 2021-12-09 PROCEDURE — 3066F PR DOCUMENTATION OF TREATMENT FOR NEPHROPATHY: ICD-10-PCS | Mod: CPTII,S$GLB,, | Performed by: INTERNAL MEDICINE

## 2021-12-09 PROCEDURE — 3066F NEPHROPATHY DOC TX: CPT | Mod: CPTII,S$GLB,, | Performed by: INTERNAL MEDICINE

## 2021-12-09 RX ORDER — PANTOPRAZOLE SODIUM 40 MG/1
40 TABLET, DELAYED RELEASE ORAL 2 TIMES DAILY
COMMUNITY
Start: 2021-09-23

## 2021-12-09 RX ORDER — METOPROLOL SUCCINATE 100 MG/1
100 TABLET, EXTENDED RELEASE ORAL 2 TIMES DAILY
COMMUNITY
Start: 2021-08-02 | End: 2022-04-13 | Stop reason: SDUPTHER

## 2021-12-14 ENCOUNTER — PATIENT OUTREACH (OUTPATIENT)
Dept: ADMINISTRATIVE | Facility: OTHER | Age: 61
End: 2021-12-14
Payer: COMMERCIAL

## 2021-12-17 ENCOUNTER — PATIENT MESSAGE (OUTPATIENT)
Dept: ENDOCRINOLOGY | Facility: CLINIC | Age: 61
End: 2021-12-17

## 2021-12-17 ENCOUNTER — OFFICE VISIT (OUTPATIENT)
Dept: ENDOCRINOLOGY | Facility: CLINIC | Age: 61
End: 2021-12-17
Payer: COMMERCIAL

## 2021-12-17 DIAGNOSIS — I10 ESSENTIAL HYPERTENSION: ICD-10-CM

## 2021-12-17 DIAGNOSIS — E66.01 MORBID OBESITY WITH BMI OF 40.0-44.9, ADULT: ICD-10-CM

## 2021-12-17 DIAGNOSIS — I25.10 CORONARY ARTERY DISEASE INVOLVING NATIVE CORONARY ARTERY OF NATIVE HEART WITHOUT ANGINA PECTORIS: ICD-10-CM

## 2021-12-17 DIAGNOSIS — E78.00 HYPERCHOLESTEROLEMIA: ICD-10-CM

## 2021-12-17 DIAGNOSIS — E11.8 TYPE 2 DIABETES MELLITUS WITH COMPLICATION, WITHOUT LONG-TERM CURRENT USE OF INSULIN: Primary | ICD-10-CM

## 2021-12-17 PROCEDURE — 3066F PR DOCUMENTATION OF TREATMENT FOR NEPHROPATHY: ICD-10-PCS | Mod: CPTII,95,, | Performed by: NURSE PRACTITIONER

## 2021-12-17 PROCEDURE — 3066F NEPHROPATHY DOC TX: CPT | Mod: CPTII,95,, | Performed by: NURSE PRACTITIONER

## 2021-12-17 PROCEDURE — 3061F NEG MICROALBUMINURIA REV: CPT | Mod: CPTII,95,, | Performed by: NURSE PRACTITIONER

## 2021-12-17 PROCEDURE — 99214 PR OFFICE/OUTPT VISIT, EST, LEVL IV, 30-39 MIN: ICD-10-PCS | Mod: 95,,, | Performed by: NURSE PRACTITIONER

## 2021-12-17 PROCEDURE — 3061F PR NEG MICROALBUMINURIA RESULT DOCUMENTED/REVIEW: ICD-10-PCS | Mod: CPTII,95,, | Performed by: NURSE PRACTITIONER

## 2021-12-17 PROCEDURE — 99214 OFFICE O/P EST MOD 30 MIN: CPT | Mod: 95,,, | Performed by: NURSE PRACTITIONER

## 2022-01-26 ENCOUNTER — PATIENT MESSAGE (OUTPATIENT)
Dept: ADMINISTRATIVE | Facility: HOSPITAL | Age: 62
End: 2022-01-26
Payer: COMMERCIAL

## 2022-01-26 ENCOUNTER — PATIENT OUTREACH (OUTPATIENT)
Dept: ADMINISTRATIVE | Facility: HOSPITAL | Age: 62
End: 2022-01-26
Payer: COMMERCIAL

## 2022-02-23 ENCOUNTER — PATIENT OUTREACH (OUTPATIENT)
Dept: ADMINISTRATIVE | Facility: HOSPITAL | Age: 62
End: 2022-02-23
Payer: COMMERCIAL

## 2022-02-23 ENCOUNTER — PATIENT MESSAGE (OUTPATIENT)
Dept: ADMINISTRATIVE | Facility: HOSPITAL | Age: 62
End: 2022-02-23
Payer: COMMERCIAL

## 2022-02-23 NOTE — PROGRESS NOTES
Chart review completed 2022.  Care Everywhere updates requested and reviewed.  Immunizations reconciled. Media reports reviewed.  Duplicate HM overrides and  orders removed.  Overdue HM topic chart audit and/or requested.  Overdue lab testing linked to upcoming lab appointments if applies.

## 2022-02-24 ENCOUNTER — PATIENT OUTREACH (OUTPATIENT)
Dept: ADMINISTRATIVE | Facility: HOSPITAL | Age: 62
End: 2022-02-24
Payer: COMMERCIAL

## 2022-02-24 NOTE — PROGRESS NOTES
Gap report chart review completed for overdue diabetic eye exam  Care everywhere and Media reports reviewed.       Patient states he plans to schedule an eye exam after 3/1 as his new insurance with Medicare will be starting.

## 2022-03-03 ENCOUNTER — LAB VISIT (OUTPATIENT)
Dept: LAB | Facility: CLINIC | Age: 62
End: 2022-03-03
Payer: MEDICARE

## 2022-03-03 DIAGNOSIS — E78.1 HYPERTRIGLYCERIDEMIA: ICD-10-CM

## 2022-03-03 DIAGNOSIS — E11.8 TYPE 2 DIABETES MELLITUS WITH COMPLICATION, WITHOUT LONG-TERM CURRENT USE OF INSULIN: ICD-10-CM

## 2022-03-03 LAB
ANION GAP SERPL CALC-SCNC: 13 MMOL/L (ref 8–16)
BUN SERPL-MCNC: 20 MG/DL (ref 8–23)
CALCIUM SERPL-MCNC: 9.4 MG/DL (ref 8.7–10.5)
CHLORIDE SERPL-SCNC: 97 MMOL/L (ref 95–110)
CHOLEST SERPL-MCNC: 99 MG/DL (ref 120–199)
CHOLEST/HDLC SERPL: 4.1 {RATIO} (ref 2–5)
CO2 SERPL-SCNC: 22 MMOL/L (ref 23–29)
CREAT SERPL-MCNC: 1.2 MG/DL (ref 0.5–1.4)
EST. GFR  (AFRICAN AMERICAN): >60 ML/MIN/1.73 M^2
EST. GFR  (NON AFRICAN AMERICAN): >60 ML/MIN/1.73 M^2
ESTIMATED AVG GLUCOSE: 180 MG/DL (ref 68–131)
GLUCOSE SERPL-MCNC: 177 MG/DL (ref 70–110)
HBA1C MFR BLD: 7.9 % (ref 4–5.6)
HDLC SERPL-MCNC: 24 MG/DL (ref 40–75)
HDLC SERPL: 24.2 % (ref 20–50)
LDLC SERPL CALC-MCNC: ABNORMAL MG/DL (ref 63–159)
NONHDLC SERPL-MCNC: 75 MG/DL
POTASSIUM SERPL-SCNC: 3.9 MMOL/L (ref 3.5–5.1)
SODIUM SERPL-SCNC: 132 MMOL/L (ref 136–145)
TRIGL SERPL-MCNC: 417 MG/DL (ref 30–150)

## 2022-03-03 PROCEDURE — 36415 COLL VENOUS BLD VENIPUNCTURE: CPT | Mod: ,,, | Performed by: INTERNAL MEDICINE

## 2022-03-03 PROCEDURE — 83036 HEMOGLOBIN GLYCOSYLATED A1C: CPT | Performed by: NURSE PRACTITIONER

## 2022-03-03 PROCEDURE — 80061 LIPID PANEL: CPT | Performed by: INTERNAL MEDICINE

## 2022-03-03 PROCEDURE — 36415 PR COLLECTION VENOUS BLOOD,VENIPUNCTURE: ICD-10-PCS | Mod: ,,, | Performed by: INTERNAL MEDICINE

## 2022-03-03 PROCEDURE — 80048 BASIC METABOLIC PNL TOTAL CA: CPT | Performed by: NURSE PRACTITIONER

## 2022-03-09 ENCOUNTER — OFFICE VISIT (OUTPATIENT)
Dept: FAMILY MEDICINE | Facility: CLINIC | Age: 62
End: 2022-03-09
Payer: MEDICARE

## 2022-03-09 VITALS
HEART RATE: 73 BPM | BODY MASS INDEX: 40.1 KG/M2 | OXYGEN SATURATION: 95 % | HEIGHT: 69 IN | DIASTOLIC BLOOD PRESSURE: 78 MMHG | WEIGHT: 270.75 LBS | SYSTOLIC BLOOD PRESSURE: 135 MMHG

## 2022-03-09 DIAGNOSIS — E87.1 HYPONATREMIA: ICD-10-CM

## 2022-03-09 DIAGNOSIS — I10 ESSENTIAL HYPERTENSION: Primary | ICD-10-CM

## 2022-03-09 DIAGNOSIS — I25.118 CORONARY ARTERY DISEASE INVOLVING NATIVE CORONARY ARTERY OF NATIVE HEART WITH OTHER FORM OF ANGINA PECTORIS: ICD-10-CM

## 2022-03-09 DIAGNOSIS — E11.8 TYPE 2 DIABETES MELLITUS WITH COMPLICATION, WITHOUT LONG-TERM CURRENT USE OF INSULIN: ICD-10-CM

## 2022-03-09 DIAGNOSIS — E66.01 MORBID OBESITY WITH BMI OF 40.0-44.9, ADULT: ICD-10-CM

## 2022-03-09 PROCEDURE — 1159F MED LIST DOCD IN RCRD: CPT | Mod: CPTII,S$GLB,, | Performed by: INTERNAL MEDICINE

## 2022-03-09 PROCEDURE — 3078F PR MOST RECENT DIASTOLIC BLOOD PRESSURE < 80 MM HG: ICD-10-PCS | Mod: CPTII,S$GLB,, | Performed by: INTERNAL MEDICINE

## 2022-03-09 PROCEDURE — 99214 PR OFFICE/OUTPT VISIT, EST, LEVL IV, 30-39 MIN: ICD-10-PCS | Mod: S$GLB,,, | Performed by: INTERNAL MEDICINE

## 2022-03-09 PROCEDURE — 3051F PR MOST RECENT HEMOGLOBIN A1C LEVEL 7.0 - < 8.0%: ICD-10-PCS | Mod: CPTII,S$GLB,, | Performed by: INTERNAL MEDICINE

## 2022-03-09 PROCEDURE — 99999 PR PBB SHADOW E&M-EST. PATIENT-LVL IV: ICD-10-PCS | Mod: PBBFAC,,, | Performed by: INTERNAL MEDICINE

## 2022-03-09 PROCEDURE — 1159F PR MEDICATION LIST DOCUMENTED IN MEDICAL RECORD: ICD-10-PCS | Mod: CPTII,S$GLB,, | Performed by: INTERNAL MEDICINE

## 2022-03-09 PROCEDURE — 1160F PR REVIEW ALL MEDS BY PRESCRIBER/CLIN PHARMACIST DOCUMENTED: ICD-10-PCS | Mod: CPTII,S$GLB,, | Performed by: INTERNAL MEDICINE

## 2022-03-09 PROCEDURE — 99999 PR PBB SHADOW E&M-EST. PATIENT-LVL IV: CPT | Mod: PBBFAC,,, | Performed by: INTERNAL MEDICINE

## 2022-03-09 PROCEDURE — 3078F DIAST BP <80 MM HG: CPT | Mod: CPTII,S$GLB,, | Performed by: INTERNAL MEDICINE

## 2022-03-09 PROCEDURE — 3075F PR MOST RECENT SYSTOLIC BLOOD PRESS GE 130-139MM HG: ICD-10-PCS | Mod: CPTII,S$GLB,, | Performed by: INTERNAL MEDICINE

## 2022-03-09 PROCEDURE — 99214 OFFICE O/P EST MOD 30 MIN: CPT | Mod: S$GLB,,, | Performed by: INTERNAL MEDICINE

## 2022-03-09 PROCEDURE — 3008F PR BODY MASS INDEX (BMI) DOCUMENTED: ICD-10-PCS | Mod: CPTII,S$GLB,, | Performed by: INTERNAL MEDICINE

## 2022-03-09 PROCEDURE — 3075F SYST BP GE 130 - 139MM HG: CPT | Mod: CPTII,S$GLB,, | Performed by: INTERNAL MEDICINE

## 2022-03-09 PROCEDURE — 1160F RVW MEDS BY RX/DR IN RCRD: CPT | Mod: CPTII,S$GLB,, | Performed by: INTERNAL MEDICINE

## 2022-03-09 PROCEDURE — 3008F BODY MASS INDEX DOCD: CPT | Mod: CPTII,S$GLB,, | Performed by: INTERNAL MEDICINE

## 2022-03-09 PROCEDURE — 3051F HG A1C>EQUAL 7.0%<8.0%: CPT | Mod: CPTII,S$GLB,, | Performed by: INTERNAL MEDICINE

## 2022-03-09 RX ORDER — SPIRONOLACTONE 25 MG/1
25 TABLET ORAL DAILY
Qty: 90 TABLET | Refills: 1 | Status: SHIPPED | OUTPATIENT
Start: 2022-03-09 | End: 2022-12-14

## 2022-03-09 RX ORDER — AMLODIPINE BESYLATE 2.5 MG/1
2.5 TABLET ORAL DAILY
Qty: 30 TABLET | Refills: 11 | Status: SHIPPED | OUTPATIENT
Start: 2022-03-09 | End: 2022-03-30

## 2022-03-09 RX ORDER — VALSARTAN 320 MG/1
320 TABLET ORAL DAILY
Qty: 90 TABLET | Refills: 3 | Status: SHIPPED | OUTPATIENT
Start: 2022-03-09 | End: 2023-06-01 | Stop reason: SDUPTHER

## 2022-03-09 RX ORDER — NITROGLYCERIN 0.4 MG/1
0.4 TABLET SUBLINGUAL EVERY 5 MIN PRN
Qty: 25 TABLET | Refills: 0 | Status: SHIPPED | OUTPATIENT
Start: 2022-03-09 | End: 2024-03-21

## 2022-03-09 RX ORDER — SPIRONOLACTONE 25 MG/1
25 TABLET ORAL DAILY
Qty: 90 TABLET | Refills: 1 | Status: CANCELLED | OUTPATIENT
Start: 2022-03-09 | End: 2023-03-09

## 2022-03-09 RX ORDER — AMOXICILLIN 500 MG/1
TABLET, FILM COATED ORAL
COMMUNITY
Start: 2022-02-15

## 2022-03-09 NOTE — PROGRESS NOTES
Patient ID: Rashel Munson III is a 62 y.o. male.    Chief Complaint: Follow-up (Review of labs. 3 month review)      Assessment and Plan      Hyponatremia- could be spironolactone and hydrochlorothiazide.   Will stop hydrochlorothiazide.   Continue valsartan  Add amlodipine 2.5 mg  Continue spironolactone   Repeat sodium in 3 weeks.   Consider fenofibrate if triglycerides  > 500.   Discussed importance of weight loss.  Needs eye exam  Diabetes improved, continue to monitor, follow-up Endocrinology    1. Essential hypertension  valsartan (DIOVAN) 320 MG tablet    spironolactone (ALDACTONE) 25 MG tablet    amLODIPine (NORVASC) 2.5 MG tablet   2. Coronary artery disease involving native coronary artery of native heart with other form of angina pectoris  nitroGLYCERIN (NITROSTAT) 0.4 MG SL tablet   3. Hyponatremia  Sodium   4. Type 2 diabetes mellitus with complication, without long-term current use of insulin     5. Morbid obesity with BMI of 40.0-44.9, adult       HPI     Lab review    A1c improved on Jardiance.   Triglycerides elevated at 417, vascepa too expensive, taking rosuvastatin.   Hyponatremia- present for 1 yr.   Still having shortness of breath has seen pulmonology and multiple cardiologist for this.  No cause identified      Review of Systems   Constitutional: Negative for fever.   Respiratory: Positive for shortness of breath.    Cardiovascular: Negative for chest pain.   Gastrointestinal: Negative for abdominal pain.        Vitals:    03/09/22 1016   BP: 135/78   Pulse:      Physical Exam  Cardiovascular:      Rate and Rhythm: Normal rate and regular rhythm.      Heart sounds: No murmur heard.    No gallop.   Pulmonary:      Breath sounds: Normal breath sounds. No wheezing or rhonchi.   Abdominal:      General: Bowel sounds are normal.      Palpations: Abdomen is soft.      Tenderness: There is no abdominal tenderness.   Musculoskeletal:         General: Normal range of motion.      Cervical back:  "Neck supple.   Skin:     General: Skin is warm.      Findings: No rash.   Neurological:      Mental Status: He is alert.   Psychiatric:         Behavior: Behavior normal.         I personally reviewed past medical, family and social history.  Medication List with Changes/Refills   New Medications    AMLODIPINE (NORVASC) 2.5 MG TABLET    Take 1 tablet (2.5 mg total) by mouth once daily.    VALSARTAN (DIOVAN) 320 MG TABLET    Take 1 tablet (320 mg total) by mouth once daily.   Current Medications    AMOXICILLIN (AMOXIL) 500 MG TAB    TAKE FOUR TS PO 1 HOUR B DAPP    ASPIRIN (ECOTRIN) 81 MG EC TABLET    Take 81 mg by mouth once daily.     EMPAGLIFLOZIN (JARDIANCE) 10 MG TABLET    Take 1 tablet (10 mg total) by mouth once daily.    GLIMEPIRIDE (AMARYL) 4 MG TABLET    Take 1 tablet (4 mg total) by mouth before breakfast.    HYDRALAZINE (APRESOLINE) 100 MG TABLET    Take 1 tablet (100 mg total) by mouth 2 (two) times daily.    INSULIN DEGLUDEC (TRESIBA FLEXTOUCH U-200) 200 UNIT/ML (3 ML) INPN    Inject 66 Units into the skin once daily.    METFORMIN (GLUCOPHAGE) 1000 MG TABLET    Take 1 tablet (1,000 mg total) by mouth 2 (two) times daily with meals.    PANTOPRAZOLE (PROTONIX) 40 MG TABLET    Take 40 mg by mouth 2 (two) times daily.    PEN NEEDLE, DIABETIC (BD ULTRA-FINE RENY PEN NEEDLE) 32 GAUGE X 5/32" NDLE    USE 1 NEEDLE DAILY WITH INSULIN    POTASSIUM CHLORIDE SA (K-DUR,KLOR-CON) 20 MEQ TABLET    Take 1 tablet (20 mEq total) by mouth once daily. with food.    ROSUVASTATIN (CRESTOR) 20 MG TABLET    Take 20 mg by mouth once daily.    SEMAGLUTIDE (OZEMPIC) 1 MG/DOSE (4 MG/3 ML)    Inject 1 mg into the skin every 7 days.    TOPROL  MG 24 HR TABLET    Take 100 mg by mouth 2 (two) times daily.   Changed and/or Refilled Medications    Modified Medication Previous Medication    NITROGLYCERIN (NITROSTAT) 0.4 MG SL TABLET nitroGLYCERIN (NITROSTAT) 0.4 MG SL tablet       Place 1 tablet (0.4 mg total) under the tongue " every 5 (five) minutes as needed.    Place 1 tablet (0.4 mg total) under the tongue every 5 (five) minutes as needed.    SPIRONOLACTONE (ALDACTONE) 25 MG TABLET spironolactone (ALDACTONE) 25 MG tablet       Take 1 tablet (25 mg total) by mouth once daily.    Take 1 tablet (25 mg total) by mouth once daily.   Discontinued Medications    VALSARTAN-HYDROCHLOROTHIAZIDE (DIOVAN-HCT) 320-25 MG PER TABLET

## 2022-03-16 ENCOUNTER — PATIENT MESSAGE (OUTPATIENT)
Dept: ADMINISTRATIVE | Facility: HOSPITAL | Age: 62
End: 2022-03-16
Payer: MEDICARE

## 2022-03-16 ENCOUNTER — PATIENT OUTREACH (OUTPATIENT)
Dept: ADMINISTRATIVE | Facility: HOSPITAL | Age: 62
End: 2022-03-16
Payer: MEDICARE

## 2022-03-17 ENCOUNTER — PATIENT OUTREACH (OUTPATIENT)
Dept: ADMINISTRATIVE | Facility: OTHER | Age: 62
End: 2022-03-17
Payer: MEDICARE

## 2022-03-18 ENCOUNTER — TELEPHONE (OUTPATIENT)
Dept: ENDOCRINOLOGY | Facility: CLINIC | Age: 62
End: 2022-03-18

## 2022-03-18 ENCOUNTER — OFFICE VISIT (OUTPATIENT)
Dept: ENDOCRINOLOGY | Facility: CLINIC | Age: 62
End: 2022-03-18
Payer: MEDICARE

## 2022-03-18 DIAGNOSIS — I25.10 CORONARY ARTERY DISEASE INVOLVING NATIVE CORONARY ARTERY OF NATIVE HEART WITHOUT ANGINA PECTORIS: ICD-10-CM

## 2022-03-18 DIAGNOSIS — I10 ESSENTIAL HYPERTENSION: ICD-10-CM

## 2022-03-18 DIAGNOSIS — E11.8 TYPE 2 DIABETES MELLITUS WITH COMPLICATION, WITHOUT LONG-TERM CURRENT USE OF INSULIN: Primary | ICD-10-CM

## 2022-03-18 DIAGNOSIS — E78.00 HYPERCHOLESTEROLEMIA: ICD-10-CM

## 2022-03-18 DIAGNOSIS — E66.01 MORBID OBESITY WITH BMI OF 40.0-44.9, ADULT: ICD-10-CM

## 2022-03-18 PROCEDURE — 3051F PR MOST RECENT HEMOGLOBIN A1C LEVEL 7.0 - < 8.0%: ICD-10-PCS | Mod: CPTII,95,, | Performed by: NURSE PRACTITIONER

## 2022-03-18 PROCEDURE — 1159F PR MEDICATION LIST DOCUMENTED IN MEDICAL RECORD: ICD-10-PCS | Mod: CPTII,95,, | Performed by: NURSE PRACTITIONER

## 2022-03-18 PROCEDURE — 1160F PR REVIEW ALL MEDS BY PRESCRIBER/CLIN PHARMACIST DOCUMENTED: ICD-10-PCS | Mod: CPTII,95,, | Performed by: NURSE PRACTITIONER

## 2022-03-18 PROCEDURE — 99214 OFFICE O/P EST MOD 30 MIN: CPT | Mod: 95,,, | Performed by: NURSE PRACTITIONER

## 2022-03-18 PROCEDURE — 4010F ACE/ARB THERAPY RXD/TAKEN: CPT | Mod: CPTII,95,, | Performed by: NURSE PRACTITIONER

## 2022-03-18 PROCEDURE — 99214 PR OFFICE/OUTPT VISIT, EST, LEVL IV, 30-39 MIN: ICD-10-PCS | Mod: 95,,, | Performed by: NURSE PRACTITIONER

## 2022-03-18 PROCEDURE — 1160F RVW MEDS BY RX/DR IN RCRD: CPT | Mod: CPTII,95,, | Performed by: NURSE PRACTITIONER

## 2022-03-18 PROCEDURE — 1159F MED LIST DOCD IN RCRD: CPT | Mod: CPTII,95,, | Performed by: NURSE PRACTITIONER

## 2022-03-18 PROCEDURE — 3051F HG A1C>EQUAL 7.0%<8.0%: CPT | Mod: CPTII,95,, | Performed by: NURSE PRACTITIONER

## 2022-03-18 PROCEDURE — 4010F PR ACE/ARB THEARPY RXD/TAKEN: ICD-10-PCS | Mod: CPTII,95,, | Performed by: NURSE PRACTITIONER

## 2022-03-18 RX ORDER — GLIMEPIRIDE 4 MG/1
4 TABLET ORAL 2 TIMES DAILY WITH MEALS
Qty: 180 TABLET | Refills: 3 | Status: SHIPPED | OUTPATIENT
Start: 2022-03-18 | End: 2022-06-17 | Stop reason: SDUPTHER

## 2022-03-18 RX ORDER — FENOFIBRATE 54 MG/1
54 TABLET ORAL DAILY
Qty: 90 TABLET | Refills: 3 | Status: SHIPPED | OUTPATIENT
Start: 2022-03-18 | End: 2022-06-17

## 2022-03-18 NOTE — Clinical Note
Elinor, can you please look into qualification for Toujeo Max (in place of Tresiba), Jardiance, Trulicity (in place of Ozempic). If he qualifies let me know so I can let you know the doses of Trulicity and Toujeo Max. Jardiance dose would actually need to be increased too. Thanks

## 2022-03-18 NOTE — PROGRESS NOTES
The patient location is:  Patient Home   The chief complaint leading to consultation is: Type 2 DM  Visit type: Virtual visit with synchronous audio and video  Total time spent with patient: 30 min  Each patient to whom he or she provides medical services by telemedicine is:  (1) informed of the relationship between the physician and patient and the respective role of any other health care provider with respect to management of the patient; and (2) notified that he or she may decline to receive medical services by telemedicine and may withdraw from such care at any time.       CC: Mr. Rashel Munson III arrives today for management of Type 2 DM and review of chronic medical conditions, as listed in the Visit Diagnosis section of this encounter.       HPI: Mr. Rashel Munson III was diagnosed with Type 2 DM in ~2010. He was diagnosed based on lab work. Initial treatment consisted of metformin. + FH of DM in maternal GM and uncles. Denies hospitalizations due to DM.   He has CAD and recently with 5 stent placements. Follows with Dr. Mccallum.     Patient was last seen by me in December.     He recently changed to Medicare at the beginning of this month. His branded meds have been expensive. He just bought 90 day supply of his diabetes meds. Tresiba not on formulary but Toujeo Max is. He will be in [facility] soon.     BG readings are checked a few times/week. No logs for review. Reports these range 120-160.     Hypoglycemia: No    Missing Insulin/PO medication doses: No    Exercise: No formal     Dietary Habits: Eats 3 meals/day. Occasional snacking but tries to keep snacks out of the house.  Drinks occasional milk.    Last DM education appointment: 9/2020    His PCP took him off of HCTZ and has started amlodipine in an effort to correct hyponatremia. He has upcoming lab work.       CURRENT DIABETIC MEDS: metformin 1000 mg BID, glimepiride 4 mg daily, Jardiance 10 mg daily, Ozempic 1 mg weekly, Tresiba 66 units  QAM  Vial or pen: n/a  Glucometer type: Darlyn    Previous DM treatments:  Glyburide     Last Eye Exam: 9/2019, Dr. Walsh's old office (can't recall provider's name). No  2021 Eye camera had poor image quality.   Last Podiatry Exam: n/a    REVIEW OF SYSTEMS  Constitutional: no c/o fatigue, weakness, weight loss.  Eyes: denies visual disturbances.  Cardiac: no palpitations or chest pain.  Respiratory: denies cough. + chronic intermittent dyspnea on exertion.   GI: no c/o abdominal pain or nausea. Denies h/o pancreatitis.    Skin: no lesions or rashes.  Neuro: no numbness, tingling, or parasthesias.  Endocrine: denies polyphagia, polydipsia, polyuria. Denies personal or family h/o MTC, MEN2.      Personally reviewed Past Medical, Surgical, Social History.    Vital Signs  There were no vitals taken for this visit.  -- video visit     Personally reviewed the below labs:    Hemoglobin A1C   Date Value Ref Range Status   03/03/2022 7.9 (H) 4.0 - 5.6 % Final     Comment:     ADA Screening Guidelines:  5.7-6.4%  Consistent with prediabetes  >or=6.5%  Consistent with diabetes    High levels of fetal hemoglobin interfere with the HbA1C  assay. Heterozygous hemoglobin variants (HbS, HgC, etc)do  not significantly interfere with this assay.   However, presence of multiple variants may affect accuracy.     12/02/2021 10.3 (H) 4.0 - 5.6 % Final     Comment:     ADA Screening Guidelines:  5.7-6.4%  Consistent with prediabetes  >or=6.5%  Consistent with diabetes    High levels of fetal hemoglobin interfere with the HbA1C  assay. Heterozygous hemoglobin variants (HbS, HgC, etc)do  not significantly interfere with this assay.   However, presence of multiple variants may affect accuracy.     11/09/2020 8.4 (A) 4.0 - 6.0 % Final       Chemistry        Component Value Date/Time     (L) 03/03/2022 0921    K 3.9 03/03/2022 0921    CL 97 03/03/2022 0921    CO2 22 (L) 03/03/2022 0921    BUN 20 03/03/2022 0921    CREATININE 1.2  03/03/2022 0921     (H) 03/03/2022 0921        Component Value Date/Time    CALCIUM 9.4 03/03/2022 0921    ALKPHOS 78 12/02/2021 0950    AST 52 (H) 12/02/2021 0950    ALT 38 12/02/2021 0950    BILITOT 0.8 12/02/2021 0950    ESTGFRAFRICA >60.0 03/03/2022 0921    EGFRNONAA >60.0 03/03/2022 0921          Lab Results   Component Value Date    CHOL 99 (L) 03/03/2022    CHOL 118 (L) 12/02/2021    CHOL 109 02/24/2020     Lab Results   Component Value Date    HDL 24 (L) 03/03/2022    HDL 25 (L) 12/02/2021    HDL 25 (A) 02/24/2020     Lab Results   Component Value Date    LDLCALC Invalid, Trig>400.0 03/03/2022    LDLCALC Invalid, Trig>400.0 12/02/2021    LDLCALC 9 02/24/2020     Lab Results   Component Value Date    TRIG 417 (H) 03/03/2022    TRIG 461 (H) 12/02/2021    TRIG 377 (A) 02/24/2020     Lab Results   Component Value Date    CHOLHDL 24.2 03/03/2022    CHOLHDL 21.2 12/02/2021    CHOLHDL 30.9 09/05/2019    CHOLHDL 30.9 09/05/2019       Lab Results   Component Value Date    MICALBCREAT 4.7 12/02/2021     Lab Results   Component Value Date    TSH 1.469 12/02/2021       CrCl cannot be calculated (Patient's most recent lab result is older than the maximum 7 days allowed.).    No results found for: [id number]      PHYSICAL EXAMINATION  Deferred - video visit      Goals      HEMOGLOBIN A1C < 7               Assessment/Plan  1. Type 2 diabetes mellitus with complication, without long-term current use of insulin  -- Improving. Remains uncontrolled.   -- continue glimepiride 4 mg with breakfast. Increase glimepiride to 2 mg with dinner. If FBG remain >130, increase to 4 mg before dinner  -- continue Tresiba, Ozempic, Jardiance, metformin  -- will contact Patient Assistance to look into qualification for Toujeo Max, Jardiance, Trulicity (can't receive Ozempic shipments and this can't be sent to patient's home)  -- check BG 2x/day   2. Coronary artery disease involving native coronary artery of native heart without  angina pectoris  -- avoid hypoglycemia and optimize DM control.   -- Jardiance may add benefit.  -- follows with cardiology   3. Hypertension  -- continue current meds. Managed by cardiology   4. Hypercholesterolemia  -- uncontrolled with elevated TRG  -- Taking Crestor  -- start fenofibrate 54 mg daily. Vascepa was too expensive.   -- optimize glycemic control  -- lipid panel with RTC   5. Morbid obesity with BMI of 40.0-44.9, adult  -- uncontrolled  -- increasing insulin resistance.  -- recommended exercise regimen  There is no height or weight on file to calculate BMI.        FOLLOW UP  Follow up in about 3 months (around 6/18/2022).   Patient instructed to bring BG logs to each follow up   Patient encouraged to call for any BG/medication issues, concerns, or questions.    Orders Placed This Encounter   Procedures    Hemoglobin A1C    Comprehensive Metabolic Panel    Lipid Panel

## 2022-03-18 NOTE — PROGRESS NOTES
Health Maintenance Due   Topic Date Due    HIV Screening  Never done    Shingles Vaccine (1 of 2) Never done    Eye Exam  08/29/2020     Updates were requested from care everywhere.  Chart was reviewed for overdue Proactive [facility] Encounters (YANG) topics (CRS, Breast Cancer Screening, Eye exam)  Health Maintenance has been updated.  LINKS immunization registry triggered.  Immunizations were reconciled.

## 2022-03-21 ENCOUNTER — TELEPHONE (OUTPATIENT)
Dept: ENDOCRINOLOGY | Facility: CLINIC | Age: 62
End: 2022-03-21

## 2022-03-21 DIAGNOSIS — E11.8 TYPE 2 DIABETES MELLITUS WITH COMPLICATION, WITHOUT LONG-TERM CURRENT USE OF INSULIN: Primary | ICD-10-CM

## 2022-03-21 RX ORDER — DULAGLUTIDE 1.5 MG/.5ML
1.5 INJECTION, SOLUTION SUBCUTANEOUS
Start: 2022-03-21 | End: 2023-03-21

## 2022-03-21 RX ORDER — INSULIN GLARGINE 300 U/ML
66 INJECTION, SOLUTION SUBCUTANEOUS DAILY
Qty: 6 PEN | Refills: 3 | Status: SHIPPED | OUTPATIENT
Start: 2022-03-21 | End: 2022-06-17 | Stop reason: SDUPTHER

## 2022-03-21 NOTE — TELEPHONE ENCOUNTER
Please call patient and let him know that I spoke with Patient Assistance rep. We will start him on Trulicity in place of the Ozempic (once he finishes Ozempic supply). His Trulicity dose will be 1.5 mg weekly and will come in a pre-filled single use pen. Prior to using, I'd recommend he watch the administration video on the Trulicity website.     Unfortunately, he doesn't qualify for assistance for the Jardiance.    I am sending in Toujeo Max to replace the Tresiba. I'm wondering if this is cheaper for him. The dose will be the same - 66 units.

## 2022-03-21 NOTE — TELEPHONE ENCOUNTER
Pt. States not to send the toujeo yet. He still has tresiba left. He said he will find if he can get it cheaper somewhere else.Do send the Trulicity

## 2022-03-22 NOTE — TELEPHONE ENCOUNTER
S/w rep @ [facility] and told me they had not gotten the Rx yet and to call tomorrow to verify if it was put on hold.(rep will put a note on Pts account to put Toujeo on hold)

## 2022-03-22 NOTE — TELEPHONE ENCOUNTER
I had already sent this to [facility] yesterday. Would you mind calling them and asking them to place the order on hold for now? I messaged Elinor about initiating Trulicity Patient Assistance paperwork

## 2022-03-23 ENCOUNTER — TELEPHONE (OUTPATIENT)
Dept: ENDOCRINOLOGY | Facility: CLINIC | Age: 62
End: 2022-03-23
Payer: MEDICARE

## 2022-03-23 NOTE — TELEPHONE ENCOUNTER
Called [facility] to make sure Toujeo was put on hold. It was. We just need to call back to un hold it when patient needs it

## 2022-03-24 ENCOUNTER — LAB VISIT (OUTPATIENT)
Dept: LAB | Facility: CLINIC | Age: 62
End: 2022-03-24
Payer: MEDICARE

## 2022-03-24 DIAGNOSIS — E87.1 HYPONATREMIA: ICD-10-CM

## 2022-03-24 LAB — SODIUM SERPL-SCNC: 136 MMOL/L (ref 136–145)

## 2022-03-24 PROCEDURE — 36415 PR COLLECTION VENOUS BLOOD,VENIPUNCTURE: ICD-10-PCS | Mod: ,,, | Performed by: INTERNAL MEDICINE

## 2022-03-24 PROCEDURE — 36415 COLL VENOUS BLD VENIPUNCTURE: CPT | Mod: ,,, | Performed by: INTERNAL MEDICINE

## 2022-03-24 PROCEDURE — 84295 ASSAY OF SERUM SODIUM: CPT | Performed by: INTERNAL MEDICINE

## 2022-03-30 ENCOUNTER — OFFICE VISIT (OUTPATIENT)
Dept: FAMILY MEDICINE | Facility: CLINIC | Age: 62
End: 2022-03-30
Payer: MEDICARE

## 2022-03-30 VITALS
SYSTOLIC BLOOD PRESSURE: 150 MMHG | HEART RATE: 85 BPM | HEIGHT: 69 IN | DIASTOLIC BLOOD PRESSURE: 82 MMHG | OXYGEN SATURATION: 96 % | BODY MASS INDEX: 41.14 KG/M2 | WEIGHT: 277.75 LBS

## 2022-03-30 DIAGNOSIS — E87.1 HYPONATREMIA: Primary | ICD-10-CM

## 2022-03-30 DIAGNOSIS — E11.8 TYPE 2 DIABETES MELLITUS WITH COMPLICATION, WITHOUT LONG-TERM CURRENT USE OF INSULIN: ICD-10-CM

## 2022-03-30 DIAGNOSIS — I10 ESSENTIAL HYPERTENSION: ICD-10-CM

## 2022-03-30 PROCEDURE — 3077F SYST BP >= 140 MM HG: CPT | Mod: CPTII,S$GLB,, | Performed by: INTERNAL MEDICINE

## 2022-03-30 PROCEDURE — 3008F PR BODY MASS INDEX (BMI) DOCUMENTED: ICD-10-PCS | Mod: CPTII,S$GLB,, | Performed by: INTERNAL MEDICINE

## 2022-03-30 PROCEDURE — 99999 PR PBB SHADOW E&M-EST. PATIENT-LVL V: ICD-10-PCS | Mod: PBBFAC,,, | Performed by: INTERNAL MEDICINE

## 2022-03-30 PROCEDURE — 3051F PR MOST RECENT HEMOGLOBIN A1C LEVEL 7.0 - < 8.0%: ICD-10-PCS | Mod: CPTII,S$GLB,, | Performed by: INTERNAL MEDICINE

## 2022-03-30 PROCEDURE — 99213 PR OFFICE/OUTPT VISIT, EST, LEVL III, 20-29 MIN: ICD-10-PCS | Mod: S$GLB,,, | Performed by: INTERNAL MEDICINE

## 2022-03-30 PROCEDURE — 1159F PR MEDICATION LIST DOCUMENTED IN MEDICAL RECORD: ICD-10-PCS | Mod: CPTII,S$GLB,, | Performed by: INTERNAL MEDICINE

## 2022-03-30 PROCEDURE — 3077F PR MOST RECENT SYSTOLIC BLOOD PRESSURE >= 140 MM HG: ICD-10-PCS | Mod: CPTII,S$GLB,, | Performed by: INTERNAL MEDICINE

## 2022-03-30 PROCEDURE — 1160F RVW MEDS BY RX/DR IN RCRD: CPT | Mod: CPTII,S$GLB,, | Performed by: INTERNAL MEDICINE

## 2022-03-30 PROCEDURE — 3008F BODY MASS INDEX DOCD: CPT | Mod: CPTII,S$GLB,, | Performed by: INTERNAL MEDICINE

## 2022-03-30 PROCEDURE — 3051F HG A1C>EQUAL 7.0%<8.0%: CPT | Mod: CPTII,S$GLB,, | Performed by: INTERNAL MEDICINE

## 2022-03-30 PROCEDURE — 99213 OFFICE O/P EST LOW 20 MIN: CPT | Mod: S$GLB,,, | Performed by: INTERNAL MEDICINE

## 2022-03-30 PROCEDURE — 3079F DIAST BP 80-89 MM HG: CPT | Mod: CPTII,S$GLB,, | Performed by: INTERNAL MEDICINE

## 2022-03-30 PROCEDURE — 1160F PR REVIEW ALL MEDS BY PRESCRIBER/CLIN PHARMACIST DOCUMENTED: ICD-10-PCS | Mod: CPTII,S$GLB,, | Performed by: INTERNAL MEDICINE

## 2022-03-30 PROCEDURE — 4010F ACE/ARB THERAPY RXD/TAKEN: CPT | Mod: CPTII,S$GLB,, | Performed by: INTERNAL MEDICINE

## 2022-03-30 PROCEDURE — 99999 PR PBB SHADOW E&M-EST. PATIENT-LVL V: CPT | Mod: PBBFAC,,, | Performed by: INTERNAL MEDICINE

## 2022-03-30 PROCEDURE — 1159F MED LIST DOCD IN RCRD: CPT | Mod: CPTII,S$GLB,, | Performed by: INTERNAL MEDICINE

## 2022-03-30 PROCEDURE — 3079F PR MOST RECENT DIASTOLIC BLOOD PRESSURE 80-89 MM HG: ICD-10-PCS | Mod: CPTII,S$GLB,, | Performed by: INTERNAL MEDICINE

## 2022-03-30 PROCEDURE — 4010F PR ACE/ARB THEARPY RXD/TAKEN: ICD-10-PCS | Mod: CPTII,S$GLB,, | Performed by: INTERNAL MEDICINE

## 2022-03-30 RX ORDER — AMLODIPINE BESYLATE 5 MG/1
5 TABLET ORAL DAILY
Qty: 90 TABLET | Refills: 1 | Status: SHIPPED | OUTPATIENT
Start: 2022-03-30 | End: 2022-12-14

## 2022-03-30 NOTE — PROGRESS NOTES
Patient ID: Rashel Munson III is a 62 y.o. male.    Chief Complaint: lab test (Follow up NA level)      Assessment and Plan      Hyponatremia resolved  Increase amlodipine to 5 mg.   Two week follow-up hypertension  Optometry referral for diabetes    1. Hyponatremia     2. Essential hypertension     3. Type 2 diabetes mellitus with complication, without long-term current use of insulin  Ambulatory referral/consult to Optometry     HPI     Follow-up hyponatremia and blood pressure  Sodium improved off of hydrochlorothiazide  blood pressure running 130/80s at home. Usually elevated when he comes in. 150/80 today.     Review of Systems   Constitutional: Negative for fever.   Respiratory: Negative for shortness of breath.         Occasional dyspnea    Cardiovascular: Negative for chest pain.   Gastrointestinal: Negative for abdominal pain.        Vitals:    03/30/22 1019   BP: (!) 150/82   Pulse: 85     Physical Exam  Cardiovascular:      Rate and Rhythm: Normal rate and regular rhythm.      Heart sounds: No murmur heard.    No gallop.   Pulmonary:      Breath sounds: Normal breath sounds. No wheezing or rhonchi.   Abdominal:      General: Bowel sounds are normal.      Palpations: Abdomen is soft.      Tenderness: There is no abdominal tenderness.   Musculoskeletal:         General: Normal range of motion.      Cervical back: Neck supple.   Skin:     General: Skin is warm.      Findings: No rash.   Neurological:      Mental Status: He is alert.   Psychiatric:         Behavior: Behavior normal.         I personally reviewed past medical, family and social history.  Medication List with Changes/Refills   New Medications    AMLODIPINE (NORVASC) 5 MG TABLET    Take 1 tablet (5 mg total) by mouth once daily.   Current Medications    AMOXICILLIN (AMOXIL) 500 MG TAB    TAKE FOUR TS PO 1 HOUR B DAPP    ASPIRIN (ECOTRIN) 81 MG EC TABLET    Take 81 mg by mouth once daily.     DULAGLUTIDE (TRULICITY) 1.5 MG/0.5 ML PEN INJECTOR  "   Inject 1.5 mg into the skin every 7 days.    EMPAGLIFLOZIN (JARDIANCE) 10 MG TABLET    Take 1 tablet (10 mg total) by mouth once daily.    FENOFIBRATE (TRICOR) 54 MG TABLET    Take 1 tablet (54 mg total) by mouth once daily.    GLIMEPIRIDE (AMARYL) 4 MG TABLET    Take 1 tablet (4 mg total) by mouth 2 (two) times daily with meals.    HYDRALAZINE (APRESOLINE) 100 MG TABLET    Take 1 tablet (100 mg total) by mouth 2 (two) times daily.    INSULIN DEGLUDEC (TRESIBA FLEXTOUCH U-200) 200 UNIT/ML (3 ML) INPN    Inject 66 Units into the skin once daily.    INSULIN GLARGINE U-300 CONC (TOUJEO MAX U-300 SOLOSTAR) 300 UNIT/ML (3 ML) INSULIN PEN    Inject 66 Units into the skin once daily.    METFORMIN (GLUCOPHAGE) 1000 MG TABLET    Take 1 tablet (1,000 mg total) by mouth 2 (two) times daily with meals.    NITROGLYCERIN (NITROSTAT) 0.4 MG SL TABLET    Place 1 tablet (0.4 mg total) under the tongue every 5 (five) minutes as needed.    PANTOPRAZOLE (PROTONIX) 40 MG TABLET    Take 40 mg by mouth 2 (two) times daily.    PEN NEEDLE, DIABETIC (BD ULTRA-FINE RENY PEN NEEDLE) 32 GAUGE X 5/32" NDLE    USE 1 NEEDLE DAILY WITH INSULIN    POTASSIUM CHLORIDE SA (K-DUR,KLOR-CON) 20 MEQ TABLET    Take 1 tablet (20 mEq total) by mouth once daily. with food.    ROSUVASTATIN (CRESTOR) 20 MG TABLET    Take 20 mg by mouth once daily.    SEMAGLUTIDE (OZEMPIC) 1 MG/DOSE (4 MG/3 ML)    Inject 1 mg into the skin every 7 days.    SPIRONOLACTONE (ALDACTONE) 25 MG TABLET    Take 1 tablet (25 mg total) by mouth once daily.    TOPROL  MG 24 HR TABLET    Take 100 mg by mouth 2 (two) times daily.    VALSARTAN (DIOVAN) 320 MG TABLET    Take 1 tablet (320 mg total) by mouth once daily.   Discontinued Medications    AMLODIPINE (NORVASC) 2.5 MG TABLET    Take 1 tablet (2.5 mg total) by mouth once daily.               "

## 2022-04-13 ENCOUNTER — TELEPHONE (OUTPATIENT)
Dept: FAMILY MEDICINE | Facility: CLINIC | Age: 62
End: 2022-04-13

## 2022-04-13 ENCOUNTER — OFFICE VISIT (OUTPATIENT)
Dept: FAMILY MEDICINE | Facility: CLINIC | Age: 62
End: 2022-04-13
Payer: MEDICARE

## 2022-04-13 VITALS — SYSTOLIC BLOOD PRESSURE: 138 MMHG | DIASTOLIC BLOOD PRESSURE: 72 MMHG

## 2022-04-13 DIAGNOSIS — I10 ESSENTIAL HYPERTENSION: Primary | ICD-10-CM

## 2022-04-13 PROCEDURE — 99213 PR OFFICE/OUTPT VISIT, EST, LEVL III, 20-29 MIN: ICD-10-PCS | Mod: 95,,, | Performed by: INTERNAL MEDICINE

## 2022-04-13 PROCEDURE — 1159F MED LIST DOCD IN RCRD: CPT | Mod: CPTII,95,, | Performed by: INTERNAL MEDICINE

## 2022-04-13 PROCEDURE — 3075F SYST BP GE 130 - 139MM HG: CPT | Mod: CPTII,95,, | Performed by: INTERNAL MEDICINE

## 2022-04-13 PROCEDURE — 4010F ACE/ARB THERAPY RXD/TAKEN: CPT | Mod: CPTII,95,, | Performed by: INTERNAL MEDICINE

## 2022-04-13 PROCEDURE — 1159F PR MEDICATION LIST DOCUMENTED IN MEDICAL RECORD: ICD-10-PCS | Mod: CPTII,95,, | Performed by: INTERNAL MEDICINE

## 2022-04-13 PROCEDURE — 4010F PR ACE/ARB THEARPY RXD/TAKEN: ICD-10-PCS | Mod: CPTII,95,, | Performed by: INTERNAL MEDICINE

## 2022-04-13 PROCEDURE — 1160F RVW MEDS BY RX/DR IN RCRD: CPT | Mod: CPTII,95,, | Performed by: INTERNAL MEDICINE

## 2022-04-13 PROCEDURE — 99213 OFFICE O/P EST LOW 20 MIN: CPT | Mod: 95,,, | Performed by: INTERNAL MEDICINE

## 2022-04-13 PROCEDURE — 1160F PR REVIEW ALL MEDS BY PRESCRIBER/CLIN PHARMACIST DOCUMENTED: ICD-10-PCS | Mod: CPTII,95,, | Performed by: INTERNAL MEDICINE

## 2022-04-13 PROCEDURE — 3075F PR MOST RECENT SYSTOLIC BLOOD PRESS GE 130-139MM HG: ICD-10-PCS | Mod: CPTII,95,, | Performed by: INTERNAL MEDICINE

## 2022-04-13 PROCEDURE — 3051F PR MOST RECENT HEMOGLOBIN A1C LEVEL 7.0 - < 8.0%: ICD-10-PCS | Mod: CPTII,95,, | Performed by: INTERNAL MEDICINE

## 2022-04-13 PROCEDURE — 3078F PR MOST RECENT DIASTOLIC BLOOD PRESSURE < 80 MM HG: ICD-10-PCS | Mod: CPTII,95,, | Performed by: INTERNAL MEDICINE

## 2022-04-13 PROCEDURE — 3078F DIAST BP <80 MM HG: CPT | Mod: CPTII,95,, | Performed by: INTERNAL MEDICINE

## 2022-04-13 PROCEDURE — 3051F HG A1C>EQUAL 7.0%<8.0%: CPT | Mod: CPTII,95,, | Performed by: INTERNAL MEDICINE

## 2022-04-13 RX ORDER — METOPROLOL SUCCINATE 100 MG/1
100 TABLET, EXTENDED RELEASE ORAL 2 TIMES DAILY
Qty: 180 TABLET | Refills: 3 | Status: SHIPPED | OUTPATIENT
Start: 2022-04-13 | End: 2022-04-14 | Stop reason: SDUPTHER

## 2022-04-13 NOTE — PROGRESS NOTES
Patient ID: Rashel Munson III is a 62 y.o. male.    Chief Complaint: No chief complaint on file.    Visit type: VIRTUAL    Assessment and Plan      Continue amlodipine, valsartan, spironolactone, hydralazine and metoprolol  Follow-up in 6 months    1. Essential hypertension       HPI     Type 2 diabetes, hypertension, hyperlipidemia, coronary artery disease, history of DVT, morbid obesity, obstructive sleep apnea    Follow-up increase of amlodipine. blood pressure 110-130s/71-81. No increase of swelling.  Sodium in normal range.    Patient Active Problem List   Diagnosis    Tachycardia    Essential hypertension    Venous insufficiency    Morbid obesity with BMI of 40.0-44.9, adult    Type 2 diabetes mellitus with complication, without long-term current use of insulin    Nonspecific abnormal electrocardiogram (ECG) (EKG)    SOB (shortness of breath)    Hypercholesterolemia    Abnormal stress test    Coronary artery disease involving native coronary artery of native heart with other form of angina pectoris    Long term (current) use of antithrombotics/antiplatelets    Stented coronary artery    History of deep vein thrombosis (DVT) of lower extremity    Obstructive sleep apnea      Review of Systems   Constitutional: Negative for fever.   Respiratory: Negative for shortness of breath.    Cardiovascular: Negative for chest pain and palpitations.   Gastrointestinal: Negative for abdominal pain.   Musculoskeletal: Negative for neck pain.   Neurological: Negative for headaches.     Medication List with Changes/Refills   Current Medications    AMLODIPINE (NORVASC) 5 MG TABLET    Take 1 tablet (5 mg total) by mouth once daily.    AMOXICILLIN (AMOXIL) 500 MG TAB    TAKE FOUR TS PO 1 HOUR B DAPP    ASPIRIN (ECOTRIN) 81 MG EC TABLET    Take 81 mg by mouth once daily.     DULAGLUTIDE (TRULICITY) 1.5 MG/0.5 ML PEN INJECTOR    Inject 1.5 mg into the skin every 7 days.    EMPAGLIFLOZIN (JARDIANCE) 10 MG TABLET     "Take 1 tablet (10 mg total) by mouth once daily.    FENOFIBRATE (TRICOR) 54 MG TABLET    Take 1 tablet (54 mg total) by mouth once daily.    GLIMEPIRIDE (AMARYL) 4 MG TABLET    Take 1 tablet (4 mg total) by mouth 2 (two) times daily with meals.    HYDRALAZINE (APRESOLINE) 100 MG TABLET    Take 1 tablet (100 mg total) by mouth 2 (two) times daily.    INSULIN DEGLUDEC (TRESIBA FLEXTOUCH U-200) 200 UNIT/ML (3 ML) INPN    Inject 66 Units into the skin once daily.    INSULIN GLARGINE U-300 CONC (TOUJEO MAX U-300 SOLOSTAR) 300 UNIT/ML (3 ML) INSULIN PEN    Inject 66 Units into the skin once daily.    METFORMIN (GLUCOPHAGE) 1000 MG TABLET    Take 1 tablet (1,000 mg total) by mouth 2 (two) times daily with meals.    NITROGLYCERIN (NITROSTAT) 0.4 MG SL TABLET    Place 1 tablet (0.4 mg total) under the tongue every 5 (five) minutes as needed.    PANTOPRAZOLE (PROTONIX) 40 MG TABLET    Take 40 mg by mouth 2 (two) times daily.    PEN NEEDLE, DIABETIC (BD ULTRA-FINE RENY PEN NEEDLE) 32 GAUGE X 5/32" NDLE    USE 1 NEEDLE DAILY WITH INSULIN    POTASSIUM CHLORIDE SA (K-DUR,KLOR-CON) 20 MEQ TABLET    Take 1 tablet (20 mEq total) by mouth once daily. with food.    ROSUVASTATIN (CRESTOR) 20 MG TABLET    Take 20 mg by mouth once daily.    SEMAGLUTIDE (OZEMPIC) 1 MG/DOSE (4 MG/3 ML)    Inject 1 mg into the skin every 7 days.    SPIRONOLACTONE (ALDACTONE) 25 MG TABLET    Take 1 tablet (25 mg total) by mouth once daily.    VALSARTAN (DIOVAN) 320 MG TABLET    Take 1 tablet (320 mg total) by mouth once daily.   Changed and/or Refilled Medications    Modified Medication Previous Medication    TOPROL  MG 24 HR TABLET TOPROL  mg 24 hr tablet       Take 1 tablet (100 mg total) by mouth 2 (two) times daily.    Take 100 mg by mouth 2 (two) times daily.        The patient location is:  [place]  The chief complaint leading to consultation is:  Follow-up hypertension  Face to Face time with patient:  15 minutes  minutes of total time " spent on the encounter, which includes face to face time and non-face to face time preparing to see the patient (eg, review of tests), Obtaining and/or reviewing separately obtained history, Documenting clinical information in the electronic or other health record, Independently interpreting results (not separately reported) and communicating results to the patient/family/caregiver, or Care coordination (not separately reported).     Each patient to whom he or she provides medical services by telemedicine is:  (1) informed of the relationship between the physician and patient and the respective role of any other health care provider with respect to management of the patient; and (2) notified that he or she may decline to receive medical services by telemedicine and may withdraw from such care at any time.    I personally reviewed past medical, family and social history.              Answers for HPI/ROS submitted by the patient on 4/13/2022  Chronicity: recurrent  Onset: more than 1 year ago  Progression since onset: gradually improving  anxiety: No  blurred vision: No  orthopnea: No  peripheral edema: No  PND: No  sweats: No  Agents associated with hypertension: no associated agents, estrogens  CAD risks: diabetes mellitus, dyslipidemia, family history, obesity, sedentary lifestyle  Compliance problems: exercise  Improvement on treatment: mild

## 2022-04-14 RX ORDER — METOPROLOL SUCCINATE 100 MG/1
100 TABLET, EXTENDED RELEASE ORAL 2 TIMES DAILY
Qty: 180 TABLET | Refills: 3 | Status: SHIPPED | OUTPATIENT
Start: 2022-04-14 | End: 2023-04-25

## 2022-04-14 NOTE — TELEPHONE ENCOUNTER
----- Message from Valeri Gottlieb sent at 4/14/2022 12:19 PM CDT -----  Contact: marie  Type:  Pharmacy Calling to Clarify an RX    Name of Caller:  Marie  Pharmacy Name:    [facility] - [place], [place] - [address] [place] [id number]  Phone: [phone] Fax: [phone]  Prescription Name:  TOPROL  mg 24 hr tablet  What do they need to clarify?:  wanted to see if patient could have the generic for cost savings  Best Call Back Number:  [phone], ref# [id number]  Additional Information:

## 2022-04-14 NOTE — TELEPHONE ENCOUNTER
No new care gaps identified.  Powered by [employer]. Reference number: [id number].   4/14/2022 12:39:30 PM CDT

## 2022-05-02 ENCOUNTER — PATIENT MESSAGE (OUTPATIENT)
Dept: ENDOCRINOLOGY | Facility: CLINIC | Age: 62
End: 2022-05-02
Payer: MEDICARE

## 2022-05-02 NOTE — TELEPHONE ENCOUNTER
Please contact patient regarding assistance for Trulicity. It sounds like he has already qualified. Thank you!

## 2022-05-04 ENCOUNTER — TELEPHONE (OUTPATIENT)
Dept: PHARMACY | Facility: AMBULARY SURGERY CENTER | Age: 62
End: 2022-05-04
Payer: MEDICARE

## 2022-05-09 ENCOUNTER — PATIENT OUTREACH (OUTPATIENT)
Dept: ADMINISTRATIVE | Facility: OTHER | Age: 62
End: 2022-05-09
Payer: MEDICARE

## 2022-05-09 ENCOUNTER — PATIENT MESSAGE (OUTPATIENT)
Dept: SMOKING CESSATION | Facility: CLINIC | Age: 62
End: 2022-05-09
Payer: MEDICARE

## 2022-05-09 NOTE — PROGRESS NOTES
Health Maintenance Due   Topic Date Due    HIV Screening  Never done    Shingles Vaccine (1 of 2) Never done    COVID-19 Vaccine (4 - Booster for Moderna series) 04/23/2022     Updates were requested from care everywhere.  Chart was reviewed for overdue Proactive [facility] Encounters (YANG) topics (CRS, Breast Cancer Screening, Eye exam)  Health Maintenance has been updated.  LINKS immunization registry triggered.  Immunizations were reconciled.

## 2022-05-11 ENCOUNTER — OFFICE VISIT (OUTPATIENT)
Dept: OPTOMETRY | Facility: CLINIC | Age: 62
End: 2022-05-11
Payer: MEDICARE

## 2022-05-11 DIAGNOSIS — H25.13 NUCLEAR SCLEROSIS OF BOTH EYES: ICD-10-CM

## 2022-05-11 DIAGNOSIS — E11.8 TYPE 2 DIABETES MELLITUS WITH COMPLICATION, WITHOUT LONG-TERM CURRENT USE OF INSULIN: ICD-10-CM

## 2022-05-11 DIAGNOSIS — E11.9 TYPE 2 DIABETES MELLITUS WITHOUT RETINOPATHY: Primary | ICD-10-CM

## 2022-05-11 PROCEDURE — 1159F MED LIST DOCD IN RCRD: CPT | Mod: CPTII,S$GLB,, | Performed by: OPTOMETRIST

## 2022-05-11 PROCEDURE — 3051F HG A1C>EQUAL 7.0%<8.0%: CPT | Mod: CPTII,S$GLB,, | Performed by: OPTOMETRIST

## 2022-05-11 PROCEDURE — 3051F PR MOST RECENT HEMOGLOBIN A1C LEVEL 7.0 - < 8.0%: ICD-10-PCS | Mod: CPTII,S$GLB,, | Performed by: OPTOMETRIST

## 2022-05-11 PROCEDURE — 92004 COMPRE OPH EXAM NEW PT 1/>: CPT | Mod: S$GLB,,, | Performed by: OPTOMETRIST

## 2022-05-11 PROCEDURE — 2023F DILAT RTA XM W/O RTNOPTHY: CPT | Mod: CPTII,S$GLB,, | Performed by: OPTOMETRIST

## 2022-05-11 PROCEDURE — 92004 PR EYE EXAM, NEW PATIENT,COMPREHESV: ICD-10-PCS | Mod: S$GLB,,, | Performed by: OPTOMETRIST

## 2022-05-11 PROCEDURE — 4010F ACE/ARB THERAPY RXD/TAKEN: CPT | Mod: CPTII,S$GLB,, | Performed by: OPTOMETRIST

## 2022-05-11 PROCEDURE — 1160F PR REVIEW ALL MEDS BY PRESCRIBER/CLIN PHARMACIST DOCUMENTED: ICD-10-PCS | Mod: CPTII,S$GLB,, | Performed by: OPTOMETRIST

## 2022-05-11 PROCEDURE — 99999 PR PBB SHADOW E&M-EST. PATIENT-LVL III: ICD-10-PCS | Mod: PBBFAC,,, | Performed by: OPTOMETRIST

## 2022-05-11 PROCEDURE — 2023F PR DILATED RETINAL EXAM W/O EVID OF RETINOPATHY: ICD-10-PCS | Mod: CPTII,S$GLB,, | Performed by: OPTOMETRIST

## 2022-05-11 PROCEDURE — 1160F RVW MEDS BY RX/DR IN RCRD: CPT | Mod: CPTII,S$GLB,, | Performed by: OPTOMETRIST

## 2022-05-11 PROCEDURE — 4010F PR ACE/ARB THEARPY RXD/TAKEN: ICD-10-PCS | Mod: CPTII,S$GLB,, | Performed by: OPTOMETRIST

## 2022-05-11 PROCEDURE — 1159F PR MEDICATION LIST DOCUMENTED IN MEDICAL RECORD: ICD-10-PCS | Mod: CPTII,S$GLB,, | Performed by: OPTOMETRIST

## 2022-05-11 PROCEDURE — 99999 PR PBB SHADOW E&M-EST. PATIENT-LVL III: CPT | Mod: PBBFAC,,, | Performed by: OPTOMETRIST

## 2022-05-11 NOTE — PROGRESS NOTES
HPI     New pt here for annual Dm eye exam DLS- 2 years ago by Dr. Cortez    Pt states his VA is occasionally blurry, using OTC readers as needed.   DM and HTN is stable on meds.   Denies floaters and FOL.  Not using any gtts.    Hemoglobin A1C       Date                     Value               Ref Range             Status                03/03/2022               7.9 (H)             4.0 - 5.6 %           Final              Comment:    ADA Screening Guidelines:  5.7-6.4%  Consistent with   prediabetes  >or=6.5%  Consistent with diabetes    High levels of fetal   hemoglobin interfere with the HbA1C  assay. Heterozygous hemoglobin   variants (HbS, HgC, etc)do  not significantly interfere with this assay.     However, presence of multiple variants may affect accuracy.         12/02/2021               10.3 (H)            4.0 - 5.6 %           Final              Comment:    ADA Screening Guidelines:  5.7-6.4%  Consistent with   prediabetes  >or=6.5%  Consistent with diabetes    High levels of fetal   hemoglobin interfere with the HbA1C  assay. Heterozygous hemoglobin   variants (HbS, HgC, etc)do  not significantly interfere with this assay.     However, presence of multiple variants may affect accuracy.         11/09/2020               8.4 (A)             4.0 - 6.0 %           Final            ----------      Last edited by Latosha Esposito MA on 5/11/2022 10:35 AM. (History)            Assessment /Plan     For exam results, see Encounter Report.    Type 2 diabetes mellitus without retinopathy    Type 2 diabetes mellitus with complication, without long-term current use of insulin  -     Ambulatory referral/consult to Optometry    Nuclear sclerosis of both eyes      1,2. No diabetic retinopathy, no csme. Return in 1 year for dilated eye exam.  3. Educated pt on presence of cataracts and effects on vision. No surgery at this time. Recheck in one year.

## 2022-06-16 ENCOUNTER — LAB VISIT (OUTPATIENT)
Dept: LAB | Facility: CLINIC | Age: 62
End: 2022-06-16
Payer: MEDICARE

## 2022-06-16 DIAGNOSIS — E11.8 TYPE 2 DIABETES MELLITUS WITH COMPLICATION, WITHOUT LONG-TERM CURRENT USE OF INSULIN: ICD-10-CM

## 2022-06-16 LAB
ALBUMIN SERPL BCP-MCNC: 3.8 G/DL (ref 3.5–5.2)
ALP SERPL-CCNC: 64 U/L (ref 55–135)
ALT SERPL W/O P-5'-P-CCNC: 27 U/L (ref 10–44)
ANION GAP SERPL CALC-SCNC: 11 MMOL/L (ref 8–16)
AST SERPL-CCNC: 24 U/L (ref 10–40)
BILIRUB SERPL-MCNC: 0.6 MG/DL (ref 0.1–1)
BUN SERPL-MCNC: 21 MG/DL (ref 8–23)
CALCIUM SERPL-MCNC: 9.4 MG/DL (ref 8.7–10.5)
CHLORIDE SERPL-SCNC: 103 MMOL/L (ref 95–110)
CHOLEST SERPL-MCNC: 98 MG/DL (ref 120–199)
CHOLEST/HDLC SERPL: 3.8 {RATIO} (ref 2–5)
CO2 SERPL-SCNC: 23 MMOL/L (ref 23–29)
CREAT SERPL-MCNC: 1.2 MG/DL (ref 0.5–1.4)
EST. GFR  (AFRICAN AMERICAN): >60 ML/MIN/1.73 M^2
EST. GFR  (NON AFRICAN AMERICAN): >60 ML/MIN/1.73 M^2
ESTIMATED AVG GLUCOSE: 194 MG/DL (ref 68–131)
GLUCOSE SERPL-MCNC: 211 MG/DL (ref 70–110)
HBA1C MFR BLD: 8.4 % (ref 4–5.6)
HDLC SERPL-MCNC: 26 MG/DL (ref 40–75)
HDLC SERPL: 26.5 % (ref 20–50)
LDLC SERPL CALC-MCNC: 10 MG/DL (ref 63–159)
NONHDLC SERPL-MCNC: 72 MG/DL
POTASSIUM SERPL-SCNC: 4.7 MMOL/L (ref 3.5–5.1)
PROT SERPL-MCNC: 7 G/DL (ref 6–8.4)
SODIUM SERPL-SCNC: 137 MMOL/L (ref 136–145)
TRIGL SERPL-MCNC: 310 MG/DL (ref 30–150)

## 2022-06-16 PROCEDURE — 80053 COMPREHEN METABOLIC PANEL: CPT | Performed by: NURSE PRACTITIONER

## 2022-06-16 PROCEDURE — 36415 PR COLLECTION VENOUS BLOOD,VENIPUNCTURE: ICD-10-PCS | Mod: ,,, | Performed by: NURSE PRACTITIONER

## 2022-06-16 PROCEDURE — 80061 LIPID PANEL: CPT | Performed by: NURSE PRACTITIONER

## 2022-06-16 PROCEDURE — 36415 COLL VENOUS BLD VENIPUNCTURE: CPT | Mod: ,,, | Performed by: NURSE PRACTITIONER

## 2022-06-16 PROCEDURE — 83036 HEMOGLOBIN GLYCOSYLATED A1C: CPT | Performed by: NURSE PRACTITIONER

## 2022-06-17 ENCOUNTER — TELEPHONE (OUTPATIENT)
Dept: ENDOCRINOLOGY | Facility: CLINIC | Age: 62
End: 2022-06-17

## 2022-06-17 ENCOUNTER — OFFICE VISIT (OUTPATIENT)
Dept: ENDOCRINOLOGY | Facility: CLINIC | Age: 62
End: 2022-06-17
Payer: MEDICARE

## 2022-06-17 DIAGNOSIS — E78.00 HYPERCHOLESTEROLEMIA: ICD-10-CM

## 2022-06-17 DIAGNOSIS — E11.8 TYPE 2 DIABETES MELLITUS WITH COMPLICATION, WITHOUT LONG-TERM CURRENT USE OF INSULIN: Primary | ICD-10-CM

## 2022-06-17 DIAGNOSIS — I10 ESSENTIAL HYPERTENSION: ICD-10-CM

## 2022-06-17 DIAGNOSIS — I25.10 CORONARY ARTERY DISEASE INVOLVING NATIVE CORONARY ARTERY OF NATIVE HEART WITHOUT ANGINA PECTORIS: ICD-10-CM

## 2022-06-17 DIAGNOSIS — E66.01 MORBID OBESITY WITH BMI OF 40.0-44.9, ADULT: ICD-10-CM

## 2022-06-17 PROCEDURE — 1159F PR MEDICATION LIST DOCUMENTED IN MEDICAL RECORD: ICD-10-PCS | Mod: CPTII,95,, | Performed by: NURSE PRACTITIONER

## 2022-06-17 PROCEDURE — 1159F MED LIST DOCD IN RCRD: CPT | Mod: CPTII,95,, | Performed by: NURSE PRACTITIONER

## 2022-06-17 PROCEDURE — 1160F PR REVIEW ALL MEDS BY PRESCRIBER/CLIN PHARMACIST DOCUMENTED: ICD-10-PCS | Mod: CPTII,95,, | Performed by: NURSE PRACTITIONER

## 2022-06-17 PROCEDURE — 1160F RVW MEDS BY RX/DR IN RCRD: CPT | Mod: CPTII,95,, | Performed by: NURSE PRACTITIONER

## 2022-06-17 PROCEDURE — 4010F PR ACE/ARB THEARPY RXD/TAKEN: ICD-10-PCS | Mod: CPTII,95,, | Performed by: NURSE PRACTITIONER

## 2022-06-17 PROCEDURE — 99214 OFFICE O/P EST MOD 30 MIN: CPT | Mod: 95,,, | Performed by: NURSE PRACTITIONER

## 2022-06-17 PROCEDURE — 4010F ACE/ARB THERAPY RXD/TAKEN: CPT | Mod: CPTII,95,, | Performed by: NURSE PRACTITIONER

## 2022-06-17 PROCEDURE — 99214 PR OFFICE/OUTPT VISIT, EST, LEVL IV, 30-39 MIN: ICD-10-PCS | Mod: 95,,, | Performed by: NURSE PRACTITIONER

## 2022-06-17 PROCEDURE — 3052F HG A1C>EQUAL 8.0%<EQUAL 9.0%: CPT | Mod: CPTII,95,, | Performed by: NURSE PRACTITIONER

## 2022-06-17 PROCEDURE — 3052F PR MOST RECENT HEMOGLOBIN A1C LEVEL 8.0 - < 9.0%: ICD-10-PCS | Mod: CPTII,95,, | Performed by: NURSE PRACTITIONER

## 2022-06-17 RX ORDER — EMPAGLIFLOZIN 25 MG/1
25 TABLET, FILM COATED ORAL DAILY
Qty: 90 TABLET | Refills: 3 | Status: SHIPPED | OUTPATIENT
Start: 2022-06-17 | End: 2023-09-18

## 2022-06-17 RX ORDER — INSULIN GLARGINE 300 U/ML
66 INJECTION, SOLUTION SUBCUTANEOUS DAILY
Qty: 6 PEN | Refills: 3 | Status: SHIPPED | OUTPATIENT
Start: 2022-06-17 | End: 2022-09-20

## 2022-06-17 RX ORDER — GLIMEPIRIDE 4 MG/1
4 TABLET ORAL 2 TIMES DAILY WITH MEALS
Qty: 180 TABLET | Refills: 3 | Status: SHIPPED | OUTPATIENT
Start: 2022-06-17 | End: 2023-08-22

## 2022-06-17 RX ORDER — FENOFIBRATE 150 MG/1
CAPSULE ORAL
COMMUNITY
End: 2022-09-21 | Stop reason: SDUPTHER

## 2022-06-17 RX ORDER — BLOOD SUGAR DIAGNOSTIC
STRIP MISCELLANEOUS
Qty: 100 EACH | Refills: 3 | Status: SHIPPED | OUTPATIENT
Start: 2022-06-17 | End: 2023-12-08

## 2022-06-17 NOTE — TELEPHONE ENCOUNTER
Hello. Patient filled out paperwork for Trulicity patient assistance and mailed it back to [facility] middle of May. He has not heard anything back regarding this order. Can you please check into this?    Also, would he qualify for Jardiance Patient assistance?    Thank you

## 2022-06-17 NOTE — PROGRESS NOTES
The patient location is:  Patient Home   The chief complaint leading to consultation is: Type 2 DM  Visit type: Virtual visit with synchronous audio and video  Total time spent with patient: 25 min  Each patient to whom he or she provides medical services by telemedicine is:  (1) informed of the relationship between the physician and patient and the respective role of any other health care provider with respect to management of the patient; and (2) notified that he or she may decline to receive medical services by telemedicine and may withdraw from such care at any time.       CC: Mr. Rashel Munson III arrives today for management of Type 2 DM and review of chronic medical conditions, as listed in the Visit Diagnosis section of this encounter.       HPI: Mr. Rashel Munson III was diagnosed with Type 2 DM in ~2010. He was diagnosed based on lab work. Initial treatment consisted of metformin. + FH of DM in maternal GM and uncles. Denies hospitalizations due to DM.   He has CAD and recently with 5 stent placements. Follows with Dr. Mccallum.     Patient was last seen by me in March..     He filled out paperwork for Trulicity patient assistance and mailed it back to [facility]. He has not heard anything back regarding this order.    He used last of Ozempic supply 2 weeks ago. This is too expensive to continue.     BG readings are checked a few times/week. No logs for review. Reports these range 140-180. Sometimes has 200s after eating something higher carb.     Hypoglycemia: No    Missing Insulin/PO medication doses: No    Exercise: No formal     Dietary Habits: Eats 3 meals/day. Rare snack - maybe summer vegetables from the garden.  Drinks occasional milk.    Last DM education appointment: 9/2020    His cardiologist recently increased his fenofibrate to 150 mg.    CURRENT DIABETIC MEDS: metformin 1000 mg BID, glimepiride 4 mg daily/2 mg at night, Jardiance 10 mg daily, Tresiba 66 units QAM  Vial or pen: n/a  Glucometer  type: Darlyn    Previous DM treatments:  Glyburide   Ozempic - expensive    Last Eye Exam: 5/2022, no DR   Last Podiatry Exam: n/a    REVIEW OF SYSTEMS  Constitutional: no c/o fatigue, weakness, weight loss.  Eyes: denies visual disturbances.  Cardiac: no palpitations or chest pain.  Respiratory: denies cough. + chronic intermittent dyspnea on exertion.   GI: no c/o abdominal pain or nausea. Denies h/o pancreatitis.    Skin: no lesions or rashes.  Neuro: no numbness, tingling, or parasthesias.  Endocrine: denies polyphagia, polydipsia, polyuria. Denies personal or family h/o MTC, MEN2.      Personally reviewed Past Medical, Surgical, Social History.    Vital Signs  There were no vitals taken for this visit.  -- video visit     Personally reviewed the below labs:    Hemoglobin A1C   Date Value Ref Range Status   06/16/2022 8.4 (H) 4.0 - 5.6 % Final     Comment:     ADA Screening Guidelines:  5.7-6.4%  Consistent with prediabetes  >or=6.5%  Consistent with diabetes    High levels of fetal hemoglobin interfere with the HbA1C  assay. Heterozygous hemoglobin variants (HbS, HgC, etc)do  not significantly interfere with this assay.   However, presence of multiple variants may affect accuracy.     03/03/2022 7.9 (H) 4.0 - 5.6 % Final     Comment:     ADA Screening Guidelines:  5.7-6.4%  Consistent with prediabetes  >or=6.5%  Consistent with diabetes    High levels of fetal hemoglobin interfere with the HbA1C  assay. Heterozygous hemoglobin variants (HbS, HgC, etc)do  not significantly interfere with this assay.   However, presence of multiple variants may affect accuracy.     12/02/2021 10.3 (H) 4.0 - 5.6 % Final     Comment:     ADA Screening Guidelines:  5.7-6.4%  Consistent with prediabetes  >or=6.5%  Consistent with diabetes    High levels of fetal hemoglobin interfere with the HbA1C  assay. Heterozygous hemoglobin variants (HbS, HgC, etc)do  not significantly interfere with this assay.   However, presence of multiple  variants may affect accuracy.         Chemistry        Component Value Date/Time     06/16/2022 0923    K 4.7 06/16/2022 0923     06/16/2022 0923    CO2 23 06/16/2022 0923    BUN 21 06/16/2022 0923    CREATININE 1.2 06/16/2022 0923     (H) 06/16/2022 0923        Component Value Date/Time    CALCIUM 9.4 06/16/2022 0923    ALKPHOS 64 06/16/2022 0923    AST 24 06/16/2022 0923    ALT 27 06/16/2022 0923    BILITOT 0.6 06/16/2022 0923    ESTGFRAFRICA >60.0 06/16/2022 0923    EGFRNONAA >60.0 06/16/2022 0923          Lab Results   Component Value Date    CHOL 98 (L) 06/16/2022    CHOL 99 (L) 03/03/2022    CHOL 118 (L) 12/02/2021     Lab Results   Component Value Date    HDL 26 (L) 06/16/2022    HDL 24 (L) 03/03/2022    HDL 25 (L) 12/02/2021     Lab Results   Component Value Date    LDLCALC 10.0 (L) 06/16/2022    LDLCALC Invalid, Trig>400.0 03/03/2022    LDLCALC Invalid, Trig>400.0 12/02/2021     Lab Results   Component Value Date    TRIG 310 (H) 06/16/2022    TRIG 417 (H) 03/03/2022    TRIG 461 (H) 12/02/2021     Lab Results   Component Value Date    CHOLHDL 26.5 06/16/2022    CHOLHDL 24.2 03/03/2022    CHOLHDL 21.2 12/02/2021       Lab Results   Component Value Date    MICALBCREAT 4.7 12/02/2021     Lab Results   Component Value Date    TSH 1.469 12/02/2021       CrCl cannot be calculated (Unknown ideal weight.).    No results found for: [id number]      PHYSICAL EXAMINATION  Deferred - video visit      Goals      HEMOGLOBIN A1C < 7               Assessment/Plan  1. Type 2 diabetes mellitus with complication, without long-term current use of insulin  -- Uncontrolled. I explained that I will need some more consistent SMBG in order to best optimize his regimen.   -- Will contact Patient Assistance rep [facility].   -- increase Jardiance to 25 mg daily.   -- increase glimepiride to 4 mg BID  -- continue Tresiba, metformin  -- check BG 2x/day   2. Coronary artery disease  involving native coronary artery of native heart without angina pectoris  -- avoid hypoglycemia and optimize DM control.   -- Jardiance may add benefit.  -- follows with cardiology   3. Hypertension  -- continue current meds. Managed by cardiology   4. Hypercholesterolemia  -- uncontrolled with elevated TRG  -- Taking Crestor, fenofibrate 54 mg daily.   -- follows with cardiology  -- optimize glycemic control   5. Morbid obesity with BMI of 40.0-44.9, adult  -- uncontrolled  -- increasing insulin resistance.  There is no height or weight on file to calculate BMI.        FOLLOW UP  Follow up in about 3 months (around 9/17/2022).   Patient instructed to bring BG logs to each follow up   Patient encouraged to call for any BG/medication issues, concerns, or questions.    Orders Placed This Encounter   Procedures    Hemoglobin A1C    Basic Metabolic Panel

## 2022-06-21 ENCOUNTER — TELEPHONE (OUTPATIENT)
Dept: ENDOCRINOLOGY | Facility: CLINIC | Age: 62
End: 2022-06-21
Payer: MEDICARE

## 2022-07-11 ENCOUNTER — PATIENT MESSAGE (OUTPATIENT)
Dept: ENDOCRINOLOGY | Facility: CLINIC | Age: 62
End: 2022-07-11
Payer: MEDICARE

## 2022-08-17 ENCOUNTER — PATIENT OUTREACH (OUTPATIENT)
Dept: ADMINISTRATIVE | Facility: HOSPITAL | Age: 62
End: 2022-08-17
Payer: MEDICARE

## 2022-09-09 ENCOUNTER — PES CALL (OUTPATIENT)
Dept: ADMINISTRATIVE | Facility: CLINIC | Age: 62
End: 2022-09-09
Payer: MEDICARE

## 2022-09-15 ENCOUNTER — LAB VISIT (OUTPATIENT)
Dept: LAB | Facility: CLINIC | Age: 62
End: 2022-09-15
Payer: MEDICARE

## 2022-09-15 DIAGNOSIS — Z98.61 POSTSURGICAL PERCUTANEOUS TRANSLUMINAL CORONARY ANGIOPLASTY STATUS: ICD-10-CM

## 2022-09-15 DIAGNOSIS — G47.33 OBSTRUCTIVE SLEEP APNEA (ADULT) (PEDIATRIC): ICD-10-CM

## 2022-09-15 DIAGNOSIS — E11.8 TYPE 2 DIABETES MELLITUS WITH COMPLICATION, WITHOUT LONG-TERM CURRENT USE OF INSULIN: ICD-10-CM

## 2022-09-15 DIAGNOSIS — I87.2 PERIPHERAL VENOUS INSUFFICIENCY: ICD-10-CM

## 2022-09-15 DIAGNOSIS — I25.10 CORONARY ATHEROSCLEROSIS OF NATIVE CORONARY ARTERY: ICD-10-CM

## 2022-09-15 DIAGNOSIS — E78.2 MIXED HYPERLIPIDEMIA: ICD-10-CM

## 2022-09-15 DIAGNOSIS — R06.09 PLATYPNEA-ORTHODEOXIA SYNDROME: ICD-10-CM

## 2022-09-15 DIAGNOSIS — E11.9 DIABETES MELLITUS WITHOUT COMPLICATION: ICD-10-CM

## 2022-09-15 LAB
25(OH)D3+25(OH)D2 SERPL-MCNC: 28 NG/ML (ref 30–96)
ALBUMIN SERPL BCP-MCNC: 4 G/DL (ref 3.5–5.2)
ALP SERPL-CCNC: 57 U/L (ref 55–135)
ALT SERPL W/O P-5'-P-CCNC: 21 U/L (ref 10–44)
ANION GAP SERPL CALC-SCNC: 10 MMOL/L (ref 8–16)
ANION GAP SERPL CALC-SCNC: 9 MMOL/L (ref 8–16)
AST SERPL-CCNC: 20 U/L (ref 10–40)
BASOPHILS # BLD AUTO: 0.1 K/UL (ref 0–0.2)
BASOPHILS NFR BLD: 1.4 % (ref 0–1.9)
BILIRUB SERPL-MCNC: 0.6 MG/DL (ref 0.1–1)
BUN SERPL-MCNC: 20 MG/DL (ref 8–23)
BUN SERPL-MCNC: 21 MG/DL (ref 8–23)
CALCIUM SERPL-MCNC: 9.4 MG/DL (ref 8.7–10.5)
CALCIUM SERPL-MCNC: 9.6 MG/DL (ref 8.7–10.5)
CHLORIDE SERPL-SCNC: 103 MMOL/L (ref 95–110)
CHLORIDE SERPL-SCNC: 104 MMOL/L (ref 95–110)
CK SERPL-CCNC: 138 U/L (ref 20–200)
CO2 SERPL-SCNC: 24 MMOL/L (ref 23–29)
CO2 SERPL-SCNC: 24 MMOL/L (ref 23–29)
COMPLEXED PSA SERPL-MCNC: 0.46 NG/ML (ref 0–4)
CREAT SERPL-MCNC: 1.1 MG/DL (ref 0.5–1.4)
CREAT SERPL-MCNC: 1.2 MG/DL (ref 0.5–1.4)
DIFFERENTIAL METHOD: ABNORMAL
EOSINOPHIL # BLD AUTO: 0.2 K/UL (ref 0–0.5)
EOSINOPHIL NFR BLD: 2.6 % (ref 0–8)
ERYTHROCYTE [DISTWIDTH] IN BLOOD BY AUTOMATED COUNT: 13.4 % (ref 11.5–14.5)
EST. GFR  (NO RACE VARIABLE): >60 ML/MIN/1.73 M^2
EST. GFR  (NO RACE VARIABLE): >60 ML/MIN/1.73 M^2
ESTIMATED AVG GLUCOSE: 183 MG/DL (ref 68–131)
ESTIMATED AVG GLUCOSE: 186 MG/DL (ref 68–131)
FERRITIN SERPL-MCNC: 166 NG/ML (ref 20–300)
GLUCOSE SERPL-MCNC: 145 MG/DL (ref 70–110)
GLUCOSE SERPL-MCNC: 146 MG/DL (ref 70–110)
HBA1C MFR BLD: 8 % (ref 4–5.6)
HBA1C MFR BLD: 8.1 % (ref 4–5.6)
HCT VFR BLD AUTO: 47.3 % (ref 40–54)
HGB BLD-MCNC: 16.1 G/DL (ref 14–18)
IMM GRANULOCYTES # BLD AUTO: 0.27 K/UL (ref 0–0.04)
IMM GRANULOCYTES NFR BLD AUTO: 3.7 % (ref 0–0.5)
IRON SERPL-MCNC: 78 UG/DL (ref 45–160)
LYMPHOCYTES # BLD AUTO: 2 K/UL (ref 1–4.8)
LYMPHOCYTES NFR BLD: 27.6 % (ref 18–48)
MAGNESIUM SERPL-MCNC: 2.1 MG/DL (ref 1.6–2.6)
MCH RBC QN AUTO: 29.4 PG (ref 27–31)
MCHC RBC AUTO-ENTMCNC: 34 G/DL (ref 32–36)
MCV RBC AUTO: 87 FL (ref 82–98)
MONOCYTES # BLD AUTO: 0.6 K/UL (ref 0.3–1)
MONOCYTES NFR BLD: 8.3 % (ref 4–15)
NEUTROPHILS # BLD AUTO: 4.1 K/UL (ref 1.8–7.7)
NEUTROPHILS NFR BLD: 56.4 % (ref 38–73)
NRBC BLD-RTO: 0 /100 WBC
PLATELET # BLD AUTO: 229 K/UL (ref 150–450)
PMV BLD AUTO: 10.7 FL (ref 9.2–12.9)
POTASSIUM SERPL-SCNC: 3.9 MMOL/L (ref 3.5–5.1)
POTASSIUM SERPL-SCNC: 4 MMOL/L (ref 3.5–5.1)
PROT SERPL-MCNC: 7.1 G/DL (ref 6–8.4)
RBC # BLD AUTO: 5.47 M/UL (ref 4.6–6.2)
SATURATED IRON: 17 % (ref 20–50)
SODIUM SERPL-SCNC: 136 MMOL/L (ref 136–145)
SODIUM SERPL-SCNC: 138 MMOL/L (ref 136–145)
TOTAL IRON BINDING CAPACITY: 454 UG/DL (ref 250–450)
TRANSFERRIN SERPL-MCNC: 307 MG/DL (ref 200–375)
TSH SERPL DL<=0.005 MIU/L-ACNC: 1.62 UIU/ML (ref 0.4–4)
WBC # BLD AUTO: 7.21 K/UL (ref 3.9–12.7)

## 2022-09-15 PROCEDURE — 83036 HEMOGLOBIN GLYCOSYLATED A1C: CPT | Mod: 91 | Performed by: NURSE PRACTITIONER

## 2022-09-15 PROCEDURE — 85025 COMPLETE CBC W/AUTO DIFF WBC: CPT | Performed by: INTERNAL MEDICINE

## 2022-09-15 PROCEDURE — 82728 ASSAY OF FERRITIN: CPT | Performed by: INTERNAL MEDICINE

## 2022-09-15 PROCEDURE — 80053 COMPREHEN METABOLIC PANEL: CPT | Performed by: INTERNAL MEDICINE

## 2022-09-15 PROCEDURE — 82306 VITAMIN D 25 HYDROXY: CPT | Performed by: INTERNAL MEDICINE

## 2022-09-15 PROCEDURE — 83036 HEMOGLOBIN GLYCOSYLATED A1C: CPT | Performed by: INTERNAL MEDICINE

## 2022-09-15 PROCEDURE — 80061 LIPID PANEL: CPT | Mod: 59 | Performed by: INTERNAL MEDICINE

## 2022-09-15 PROCEDURE — 80048 BASIC METABOLIC PNL TOTAL CA: CPT | Mod: XB | Performed by: NURSE PRACTITIONER

## 2022-09-15 PROCEDURE — 36415 COLL VENOUS BLD VENIPUNCTURE: CPT | Mod: ,,, | Performed by: STUDENT IN AN ORGANIZED HEALTH CARE EDUCATION/TRAINING PROGRAM

## 2022-09-15 PROCEDURE — 36415 PR COLLECTION VENOUS BLOOD,VENIPUNCTURE: ICD-10-PCS | Mod: ,,, | Performed by: STUDENT IN AN ORGANIZED HEALTH CARE EDUCATION/TRAINING PROGRAM

## 2022-09-15 PROCEDURE — 82550 ASSAY OF CK (CPK): CPT | Performed by: INTERNAL MEDICINE

## 2022-09-15 PROCEDURE — 84466 ASSAY OF TRANSFERRIN: CPT | Performed by: INTERNAL MEDICINE

## 2022-09-15 PROCEDURE — 84153 ASSAY OF PSA TOTAL: CPT | Performed by: INTERNAL MEDICINE

## 2022-09-15 PROCEDURE — 83735 ASSAY OF MAGNESIUM: CPT | Performed by: INTERNAL MEDICINE

## 2022-09-15 PROCEDURE — 84443 ASSAY THYROID STIM HORMONE: CPT | Performed by: INTERNAL MEDICINE

## 2022-09-19 ENCOUNTER — TELEPHONE (OUTPATIENT)
Dept: ADMINISTRATIVE | Facility: CLINIC | Age: 62
End: 2022-09-19
Payer: MEDICARE

## 2022-09-19 ENCOUNTER — PATIENT MESSAGE (OUTPATIENT)
Dept: ADMINISTRATIVE | Facility: CLINIC | Age: 62
End: 2022-09-19
Payer: MEDICARE

## 2022-09-19 NOTE — TELEPHONE ENCOUNTER
Called pt; informed pt I was calling to confirm his virtual EAWV on 9/21/22 at 3:00pm and to see if he needed any help; pt stated he did not need any help and would complete e-pre check later today; pt informed to login 15 minutes prior to appt time; sent message through portal

## 2022-09-20 ENCOUNTER — TELEPHONE (OUTPATIENT)
Dept: ENDOCRINOLOGY | Facility: CLINIC | Age: 62
End: 2022-09-20

## 2022-09-20 ENCOUNTER — OFFICE VISIT (OUTPATIENT)
Dept: ENDOCRINOLOGY | Facility: CLINIC | Age: 62
End: 2022-09-20
Payer: MEDICARE

## 2022-09-20 DIAGNOSIS — E78.00 HYPERCHOLESTEROLEMIA: ICD-10-CM

## 2022-09-20 DIAGNOSIS — E11.8 TYPE 2 DIABETES MELLITUS WITH COMPLICATION, WITHOUT LONG-TERM CURRENT USE OF INSULIN: Primary | ICD-10-CM

## 2022-09-20 DIAGNOSIS — I25.10 CORONARY ARTERY DISEASE INVOLVING NATIVE CORONARY ARTERY OF NATIVE HEART WITHOUT ANGINA PECTORIS: ICD-10-CM

## 2022-09-20 DIAGNOSIS — E66.01 MORBID OBESITY WITH BMI OF 40.0-44.9, ADULT: ICD-10-CM

## 2022-09-20 DIAGNOSIS — I10 ESSENTIAL HYPERTENSION: ICD-10-CM

## 2022-09-20 PROCEDURE — 1160F RVW MEDS BY RX/DR IN RCRD: CPT | Mod: CPTII,95,, | Performed by: NURSE PRACTITIONER

## 2022-09-20 PROCEDURE — 1159F MED LIST DOCD IN RCRD: CPT | Mod: CPTII,95,, | Performed by: NURSE PRACTITIONER

## 2022-09-20 PROCEDURE — 1160F PR REVIEW ALL MEDS BY PRESCRIBER/CLIN PHARMACIST DOCUMENTED: ICD-10-PCS | Mod: CPTII,95,, | Performed by: NURSE PRACTITIONER

## 2022-09-20 PROCEDURE — 99214 OFFICE O/P EST MOD 30 MIN: CPT | Mod: 95,,, | Performed by: NURSE PRACTITIONER

## 2022-09-20 PROCEDURE — 4010F ACE/ARB THERAPY RXD/TAKEN: CPT | Mod: CPTII,95,, | Performed by: NURSE PRACTITIONER

## 2022-09-20 PROCEDURE — 99214 PR OFFICE/OUTPT VISIT, EST, LEVL IV, 30-39 MIN: ICD-10-PCS | Mod: 95,,, | Performed by: NURSE PRACTITIONER

## 2022-09-20 PROCEDURE — 3052F HG A1C>EQUAL 8.0%<EQUAL 9.0%: CPT | Mod: CPTII,95,, | Performed by: NURSE PRACTITIONER

## 2022-09-20 PROCEDURE — 3052F PR MOST RECENT HEMOGLOBIN A1C LEVEL 8.0 - < 9.0%: ICD-10-PCS | Mod: CPTII,95,, | Performed by: NURSE PRACTITIONER

## 2022-09-20 PROCEDURE — 1159F PR MEDICATION LIST DOCUMENTED IN MEDICAL RECORD: ICD-10-PCS | Mod: CPTII,95,, | Performed by: NURSE PRACTITIONER

## 2022-09-20 PROCEDURE — 4010F PR ACE/ARB THEARPY RXD/TAKEN: ICD-10-PCS | Mod: CPTII,95,, | Performed by: NURSE PRACTITIONER

## 2022-09-20 RX ORDER — INSULIN GLARGINE 300 U/ML
72 INJECTION, SOLUTION SUBCUTANEOUS DAILY
Qty: 6 PEN | Refills: 3 | Status: ON HOLD
Start: 2022-09-20 | End: 2023-05-03 | Stop reason: SDUPTHER

## 2022-09-20 NOTE — TELEPHONE ENCOUNTER
We are prohibited from accepting insulin shipments in clinic. Which manufacturers does this pertain to, as this is new?    We may need to change to Tresiba [id number] unless that rule pertains to NovoNoBambuser, too

## 2022-09-20 NOTE — PROGRESS NOTES
The patient location is:  Patient Home   The chief complaint leading to consultation is: Type 2 DM  Visit type: Virtual visit with synchronous audio and video  Total time spent with patient: 20 min  Each patient to whom he or she provides medical services by telemedicine is:  (1) informed of the relationship between the physician and patient and the respective role of any other health care provider with respect to management of the patient; and (2) notified that he or she may decline to receive medical services by telemedicine and may withdraw from such care at any time.       CC: Mr. Rashel Munson III arrives today for management of Type 2 DM and review of chronic medical conditions, as listed in the Visit Diagnosis section of this encounter.       HPI: Mr. Rashel Munson III was diagnosed with Type 2 DM in ~. He was diagnosed based on lab work. Initial treatment consisted of metformin. + FH of DM in maternal GM and uncles. Denies hospitalizations due to DM.   He has CAD and recently with 5 stent placements. Follows with Dr. Mccallum.     Patient was last seen by me in . Jardiance and glimepiride doses were increased.      He qualified for Trulicity patient assistance and has been taking the past month and a half.     He has upcoming appt with cardiology. Having ongoing issues with SOB, which cards feels is related to his weight.     Patient will be having bariatrics consultation on 10/6.     BG readings are checked a few times/week. No logs for review. Reports the following:   Fastin-180  After lunch: 200-220    Hypoglycemia: No    Missing Insulin/PO medication doses: No    Exercise: No formal but trying to walk. SOB has affected his exercise tolerance. He is trying to slowly increase his activity.      Dietary Habits: Eats 3 meals/day. Occasional snack but not regularly.  Drinks occasional milk.    Last DM education appointment: 2020      CURRENT DIABETIC MEDS: metformin 1000 mg BID, glimepiride  4 mg BID, Jardiance 25 mg daily, Trulicity 1.5 mg weekly, Toujeo Max 66 units QAM  Vial or pen: n/a  Glucometer type: Darlyn    Previous DM treatments:  Glyburide   Ozempic - expensive  Tresiba     Last Eye Exam: 5/2022, no DR   Last Podiatry Exam: n/a    REVIEW OF SYSTEMS  Constitutional: no c/o fatigue, weakness, weight loss. + weight gain  Cardiac: no palpitations or chest pain.  Respiratory: denies cough. + chronic intermittent dyspnea on exertion.   GI: no c/o abdominal pain or nausea. Denies h/o pancreatitis.    Skin: no lesions or rashes.  Neuro: + numbness to feet  Endocrine: denies polyphagia, polydipsia, polyuria. Denies personal or family h/o MTC, MEN2.      Personally reviewed Past Medical, Surgical, Social History.    Vital Signs  There were no vitals taken for this visit.  -- video visit     Personally reviewed the below labs:    Hemoglobin A1C   Date Value Ref Range Status   09/15/2022 8.1 (H) 4.0 - 5.6 % Final     Comment:     ADA Screening Guidelines:  5.7-6.4%  Consistent with prediabetes  >or=6.5%  Consistent with diabetes    High levels of fetal hemoglobin interfere with the HbA1C  assay. Heterozygous hemoglobin variants (HbS, HgC, etc)do  not significantly interfere with this assay.   However, presence of multiple variants may affect accuracy.     09/15/2022 8.0 (H) 4.0 - 5.6 % Final     Comment:     ADA Screening Guidelines:  5.7-6.4%  Consistent with prediabetes  >or=6.5%  Consistent with diabetes    High levels of fetal hemoglobin interfere with the HbA1C  assay. Heterozygous hemoglobin variants (HbS, HgC, etc)do  not significantly interfere with this assay.   However, presence of multiple variants may affect accuracy.     06/16/2022 8.4 (H) 4.0 - 5.6 % Final     Comment:     ADA Screening Guidelines:  5.7-6.4%  Consistent with prediabetes  >or=6.5%  Consistent with diabetes    High levels of fetal hemoglobin interfere with the HbA1C  assay. Heterozygous hemoglobin variants (HbS, HgC,  etc)do  not significantly interfere with this assay.   However, presence of multiple variants may affect accuracy.         Chemistry        Component Value Date/Time     09/15/2022 0954     09/15/2022 0954    K 3.9 09/15/2022 0954    K 4.0 09/15/2022 0954     09/15/2022 0954     09/15/2022 0954    CO2 24 09/15/2022 0954    CO2 24 09/15/2022 0954    BUN 21 09/15/2022 0954    BUN 20 09/15/2022 0954    CREATININE 1.1 09/15/2022 0954    CREATININE 1.2 09/15/2022 0954     (H) 09/15/2022 0954     (H) 09/15/2022 0954        Component Value Date/Time    CALCIUM 9.4 09/15/2022 0954    CALCIUM 9.6 09/15/2022 0954    ALKPHOS 57 09/15/2022 0954    AST 20 09/15/2022 0954    ALT 21 09/15/2022 0954    BILITOT 0.6 09/15/2022 0954    ESTGFRAFRICA >60.0 06/16/2022 0923    EGFRNONAA >60.0 06/16/2022 0923          Lab Results   Component Value Date    CHOL 98 (L) 06/16/2022    CHOL 99 (L) 03/03/2022    CHOL 118 (L) 12/02/2021     Lab Results   Component Value Date    HDL 26 (L) 06/16/2022    HDL 24 (L) 03/03/2022    HDL 25 (L) 12/02/2021     Lab Results   Component Value Date    LDLCALC 10.0 (L) 06/16/2022    LDLCALC Invalid, Trig>400.0 03/03/2022    LDLCALC Invalid, Trig>400.0 12/02/2021     Lab Results   Component Value Date    TRIG 310 (H) 06/16/2022    TRIG 417 (H) 03/03/2022    TRIG 461 (H) 12/02/2021     Lab Results   Component Value Date    CHOLHDL 26.5 06/16/2022    CHOLHDL 24.2 03/03/2022    CHOLHDL 21.2 12/02/2021       Lab Results   Component Value Date    MICALBCREAT 4.7 12/02/2021     Lab Results   Component Value Date    TSH 1.620 09/15/2022       CrCl cannot be calculated (Unknown ideal weight.).    Vit D, 25-Hydroxy   Date Value Ref Range Status   09/15/2022 28 (L) 30 - 96 ng/mL Final     Comment:     Vitamin D deficiency.........<10 ng/mL                              Vitamin D insufficiency......10-29 ng/mL       Vitamin D sufficiency........> or equal to 30 ng/mL  Vitamin D  toxicity............>100 ng/mL           PHYSICAL EXAMINATION  Deferred - video visit      Goals        HEMOGLOBIN A1C < 7                 Assessment/Plan  1. Type 2 diabetes mellitus with complication, without long-term current use of insulin  -- Uncontrolled. Attempting to optimize GLP-1RA before resorting to prandial insulin, which may cause more weight gain.   -- increase Trulicity to 3 mg weekly. Will reach out to Patient Assistance regarding next shipment.    -- increase Toujeo Max to 72 units   -- continue Jardiance, glimepiride, metformin  -- Will reach out to Patient Assistance to assess eligibility for Toujeo Max and [facility]   -- check BG 2x/day   2. Coronary artery disease involving native coronary artery of native heart without angina pectoris  -- optimize DM control.   -- Jardiance may add benefit.  -- follows with cardiology   3. Hypertension  -- continue current meds. Managed by cardiology   4. Hypercholesterolemia  -- uncontrolled with elevated TRG  -- taking Crestor, fenofibrate   -- optimize glycemic control  -- follows with cardiology   5. Morbid obesity with BMI of 40.0-44.9, adult  -- worsening   -- increasing insulin resistance.        FOLLOW UP  Follow up in about 3 months (around 12/20/2022).   Patient instructed to bring BG logs to each follow up   Patient encouraged to call for any BG/medication issues, concerns, or questions.    Orders Placed This Encounter   Procedures    Hemoglobin A1C    Microalbumin/Creatinine Ratio, Urine

## 2022-09-20 NOTE — TELEPHONE ENCOUNTER
Please reach out to pt to assess eligibility for Patient Assistance for Toujeo Max and Jardiance, as pt is almost in [employer]. Thank you!

## 2022-09-21 ENCOUNTER — TELEPHONE (OUTPATIENT)
Dept: ADMINISTRATIVE | Facility: CLINIC | Age: 62
End: 2022-09-21
Payer: MEDICARE

## 2022-09-21 ENCOUNTER — OFFICE VISIT (OUTPATIENT)
Dept: FAMILY MEDICINE | Facility: CLINIC | Age: 62
End: 2022-09-21
Payer: MEDICARE

## 2022-09-21 VITALS — BODY MASS INDEX: 41.03 KG/M2 | HEIGHT: 69 IN | WEIGHT: 277 LBS

## 2022-09-21 DIAGNOSIS — I25.118 CORONARY ARTERY DISEASE INVOLVING NATIVE CORONARY ARTERY OF NATIVE HEART WITH OTHER FORM OF ANGINA PECTORIS: ICD-10-CM

## 2022-09-21 DIAGNOSIS — E66.01 MORBID OBESITY WITH BMI OF 40.0-44.9, ADULT: ICD-10-CM

## 2022-09-21 DIAGNOSIS — R00.0 TACHYCARDIA: ICD-10-CM

## 2022-09-21 DIAGNOSIS — E11.9 TYPE 2 DIABETES MELLITUS WITHOUT COMPLICATION, WITH LONG-TERM CURRENT USE OF INSULIN: ICD-10-CM

## 2022-09-21 DIAGNOSIS — K21.9 GASTROESOPHAGEAL REFLUX DISEASE WITHOUT ESOPHAGITIS: ICD-10-CM

## 2022-09-21 DIAGNOSIS — Z79.4 TYPE 2 DIABETES MELLITUS WITHOUT COMPLICATION, WITH LONG-TERM CURRENT USE OF INSULIN: ICD-10-CM

## 2022-09-21 DIAGNOSIS — I87.2 VENOUS INSUFFICIENCY: ICD-10-CM

## 2022-09-21 DIAGNOSIS — Z00.00 ENCOUNTER FOR PREVENTIVE HEALTH EXAMINATION: Primary | ICD-10-CM

## 2022-09-21 DIAGNOSIS — Z95.5 STENTED CORONARY ARTERY: ICD-10-CM

## 2022-09-21 DIAGNOSIS — I10 ESSENTIAL HYPERTENSION: ICD-10-CM

## 2022-09-21 DIAGNOSIS — G47.33 OBSTRUCTIVE SLEEP APNEA: ICD-10-CM

## 2022-09-21 DIAGNOSIS — I70.0 THORACIC AORTA ATHEROSCLEROSIS: ICD-10-CM

## 2022-09-21 DIAGNOSIS — E78.00 HYPERCHOLESTEROLEMIA: ICD-10-CM

## 2022-09-21 DIAGNOSIS — R06.02 SOB (SHORTNESS OF BREATH): ICD-10-CM

## 2022-09-21 PROBLEM — R94.39 ABNORMAL STRESS TEST: Status: RESOLVED | Noted: 2019-09-10 | Resolved: 2022-09-21

## 2022-09-21 PROBLEM — R94.31 NONSPECIFIC ABNORMAL ELECTROCARDIOGRAM (ECG) (EKG): Status: RESOLVED | Noted: 2019-08-19 | Resolved: 2022-09-21

## 2022-09-21 PROCEDURE — G0439 PR MEDICARE ANNUAL WELLNESS SUBSEQUENT VISIT: ICD-10-PCS | Mod: 95,,,

## 2022-09-21 PROCEDURE — G0439 PPPS, SUBSEQ VISIT: HCPCS | Mod: 95,,,

## 2022-09-21 PROCEDURE — 3008F BODY MASS INDEX DOCD: CPT | Mod: CPTII,95,,

## 2022-09-21 PROCEDURE — 1159F PR MEDICATION LIST DOCUMENTED IN MEDICAL RECORD: ICD-10-PCS | Mod: CPTII,95,,

## 2022-09-21 PROCEDURE — 4010F PR ACE/ARB THEARPY RXD/TAKEN: ICD-10-PCS | Mod: CPTII,95,,

## 2022-09-21 PROCEDURE — 3052F HG A1C>EQUAL 8.0%<EQUAL 9.0%: CPT | Mod: CPTII,95,,

## 2022-09-21 PROCEDURE — 3008F PR BODY MASS INDEX (BMI) DOCUMENTED: ICD-10-PCS | Mod: CPTII,95,,

## 2022-09-21 PROCEDURE — 1159F MED LIST DOCD IN RCRD: CPT | Mod: CPTII,95,,

## 2022-09-21 PROCEDURE — 3052F PR MOST RECENT HEMOGLOBIN A1C LEVEL 8.0 - < 9.0%: ICD-10-PCS | Mod: CPTII,95,,

## 2022-09-21 PROCEDURE — 1160F PR REVIEW ALL MEDS BY PRESCRIBER/CLIN PHARMACIST DOCUMENTED: ICD-10-PCS | Mod: CPTII,95,,

## 2022-09-21 PROCEDURE — 4010F ACE/ARB THERAPY RXD/TAKEN: CPT | Mod: CPTII,95,,

## 2022-09-21 PROCEDURE — 1160F RVW MEDS BY RX/DR IN RCRD: CPT | Mod: CPTII,95,,

## 2022-09-21 RX ORDER — FENOFIBRATE 145 MG/1
145 TABLET, FILM COATED ORAL DAILY
COMMUNITY
Start: 2022-05-06 | End: 2023-06-01 | Stop reason: SDUPTHER

## 2022-09-21 NOTE — PROGRESS NOTES
"  The patient location is: [place]  The chief complaint leading to consultation is: Medicare AWV      Visit type: audiovisual    Face to Face time with patient: 30  60 minutes of total time spent on the encounter, which includes face to face time and non-face to face time preparing to see the patient (eg, review of tests), Obtaining and/or reviewing separately obtained history, Documenting clinical information in the electronic or other health record, Independently interpreting results (not separately reported) and communicating results to the patient/family/caregiver, or Care coordination (not separately reported).         Each patient to whom he or she provides medical services by telemedicine is:  (1) informed of the relationship between the physician and patient and the respective role of any other health care provider with respect to management of the patient; and (2) notified that he or she may decline to receive medical services by telemedicine and may withdraw from such care at any time.    Notes:       Rashel Munson presented for a  Medicare AWV and comprehensive Health Risk Assessment today. The following components were reviewed and updated:    Medical history  Family History  Social history  Allergies and Current Medications  Health Risk Assessment  Health Maintenance  Care Team         ** See Completed Assessments for Annual Wellness Visit within the encounter summary.**         The following assessments were completed:  Living Situation  CAGE  Depression Screening  Fall Risk Assessment (MACH 10)  Hearing Assessment(HHI)  Cognitive Function Screening  Nutrition Screening  ADL Screening  PAQ Screening      Vitals:    09/21/22 1507   Weight: 125.6 kg (277 lb)   Height: 5' 9" (1.753 m)     Body mass index is 40.91 kg/m².  Physical Exam  Constitutional:       General: He is not in acute distress.     Appearance: Normal appearance. He is well-developed and well-groomed.   Eyes:      Comments: Wearing " glasses    Pulmonary:      Effort: Pulmonary effort is normal.   Skin:     Coloration: Skin is not pale.   Neurological:      Mental Status: He is alert and oriented to person, place, and time.   Psychiatric:         Mood and Affect: Mood normal.         Speech: Speech normal.         Behavior: Behavior normal. Behavior is cooperative.         Thought Content: Thought content normal.             Diagnoses and health risks identified today and associated recommendations/orders:    1. Encounter for preventive health examination  2. Morbid obesity with BMI of 40.0-44.9, adult  Work on diet modification and increase in physical activity as tolerated. Scheduled to see Bariatrics 10/06/2022. Keep appointment.     3. Type 2 diabetes mellitus without complication, with long-term current use of insulin  Chronic; stable on medication. Followed by Endocrinology.    4. Thoracic aorta atherosclerosis  5. Coronary artery disease involving native coronary artery of native heart with other form of angina pectoris  6. Essential hypertension  7. Venous insufficiency  8. Hypercholesterolemia  9. Stented coronary artery  10. Tachycardia  11. SOB (shortness of breath)  Chronic; stable on medication. Followed by Cardiology.    12. Gastroesophageal reflux disease without esophagitis  Chronic; stable on medication. Followed by PCP.    13. Obstructive sleep apnea  Chronic. Stable. Followed by PCP.       Review for Opioid Screening: Patient does not have rx for Opioids.    Review for Substance Use Disorders: Patient does not use substance.      Provided Rashel with a 5-10 year written screening schedule and personal prevention plan. Recommendations were developed using the USPSTF age appropriate recommendations. Education, counseling, and referrals were provided as needed. After Visit Summary printed and given to patient which includes a list of additional screenings\tests needed.    Follow up in about 1 year (around 9/21/2023) for your next  annual wellness visit.    Yoly Thomas NP      Advance Care Planning     I offered to discuss advanced care planning, including how to pick a person who would make decisions for you if you were unable to make them for yourself, called a health care power of , and what kind of decisions you might make such as use of life sustaining treatments such as ventilators and tube feeding when faced with a life limiting illness recorded on a living will that they will need to know. (How you want to be cared for as you near the end of your natural life)     X Patient is interested in learning more about how to make advanced directives.  I provided them paperwork and offered to discuss this with them.

## 2022-09-21 NOTE — PATIENT INSTRUCTIONS
Counseling and Referral of Other Preventative  (Italic type indicates deductible and co-insurance are waived)    Patient Name: Rashel Munson  Today's Date: 9/21/2022    Health Maintenance       Date Due Completion Date    Shingles Vaccine (1 of 2) 11/30/2022 (Originally 1/22/2010) ---    Influenza Vaccine (1) 11/30/2022 (Originally 9/1/2022) 12/9/2021    HIV Screening 11/30/2022 (Originally 1/22/1975) ---    COVID-19 Vaccine (4 - Booster for Moderna series) 11/30/2022 (Originally 4/23/2022) 12/23/2021    Pneumococcal Vaccines (Age 0-64) (2 - PCV) 11/30/2022 (Originally 8/19/2021) 8/19/2020    Diabetes Urine Screening 12/02/2022 12/2/2021    Foot Exam 12/09/2022 12/9/2021    Hemoglobin A1c 12/15/2022 9/15/2022    Eye Exam 05/11/2023 5/11/2022    Lipid Panel 06/16/2023 6/16/2022    High Dose Statin 09/21/2023 9/21/2022    Aspirin/Antiplatelet Therapy 09/21/2023 9/21/2022    TETANUS VACCINE 08/19/2030 8/19/2020    Colorectal Cancer Screening 07/07/2031 7/7/2021        No orders of the defined types were placed in this encounter.      The following information is provided to all patients.  This information is to help you find resources for any of the problems found today that may be affecting your health:                Living healthy guide: www.[facility].[place].gov      Understanding Diabetes: www.diabetes.org      Eating healthy: www.cdc.gov/healthyweight      CDC home safety checklist: www.cdc.gov/steadi/patient.html      Agency on Aging: www.goea.[place].gov      Alcoholics anonymous (AA): www.aa.org      Physical Activity: www.benjamin.nih.gov/nz8yxif      Tobacco use: www.quitwithusla.org

## 2022-09-22 ENCOUNTER — TELEPHONE (OUTPATIENT)
Dept: ENDOCRINOLOGY | Facility: CLINIC | Age: 62
End: 2022-09-22
Payer: MEDICARE

## 2022-09-22 LAB
CHOLEST SERPL-MCNC: 89 MG/DL
HDL SERPL QN: 8.4 NM
HDL SERPL-SCNC: 19.7 UMOL/L
HDLC SERPL-MCNC: 24 MG/DL (ref 40–59)
HLD.LARGE SERPL-SCNC: <2.8 UMOL/L
LDL SERPL QN: 20.6 NM
LDL SERPL-SCNC: 850 NMOL/L
LDL SMALL SERPL-SCNC: <165 NMOL/L
LDLC SERPL CALC-MCNC: 8 MG/DL
PATHOLOGY STUDY: ABNORMAL
TRIGL SERPL-MCNC: 286 MG/DL (ref 30–149)
VLDL LARGE SERPL-SCNC: 2.5 NMOL/L
VLDL SERPL QN: 40.4 NM

## 2022-09-22 RX ORDER — INSULIN GLARGINE 100 [IU]/ML
36 INJECTION, SOLUTION SUBCUTANEOUS 2 TIMES DAILY
Refills: 0
Start: 2022-09-22 | End: 2022-10-14

## 2022-09-22 NOTE — TELEPHONE ENCOUNTER
[place], please notify patient that we will need to change his Toujeo to Basaglar due to Patient Assistance limitations with Toujeo. Basaglar dose will be 36 units TWICE daily, if approved.

## 2022-09-22 NOTE — TELEPHONE ENCOUNTER
Please change to Basaglar, as he is already enrolled with [facility] for Trulicity. Order entered in his med list.

## 2022-09-27 NOTE — TELEPHONE ENCOUNTER
The signed and completed patient assistance application was received from the providers office and  has been submitted to [email or web address] for consideration of eligibility.

## 2022-10-03 ENCOUNTER — TELEPHONE (OUTPATIENT)
Dept: PHARMACY | Facility: CLINIC | Age: 62
End: 2022-10-03
Payer: MEDICARE

## 2022-10-03 NOTE — TELEPHONE ENCOUNTER
Rashel Munson III has previously provided the necessary documents to enroll into the [facility] program. The prescription portion of the application has been sent to the providers office for approval and signature.

## 2022-10-04 ENCOUNTER — TELEPHONE (OUTPATIENT)
Dept: ENDOCRINOLOGY | Facility: CLINIC | Age: 62
End: 2022-10-04
Payer: MEDICARE

## 2022-10-11 NOTE — TELEPHONE ENCOUNTER
The signed and completed patient assistance application was received from the providers office and  has been submitted to [facility] for consideration of eligibility.

## 2022-10-13 NOTE — TELEPHONE ENCOUNTER
Rashel Munson III  has been approved in the [facility] Patient Assistance Program through 12/31/22. A 120 day supply of Basaglar will be shipped to PATIENT'S HOME in 7-10 business days. ZERO refills remaining.  Updated proof of eligibility is required to re-enroll for 2023 calendar year.

## 2022-10-14 ENCOUNTER — OFFICE VISIT (OUTPATIENT)
Dept: FAMILY MEDICINE | Facility: CLINIC | Age: 62
End: 2022-10-14
Payer: MEDICARE

## 2022-10-14 VITALS
OXYGEN SATURATION: 95 % | DIASTOLIC BLOOD PRESSURE: 80 MMHG | HEIGHT: 69 IN | WEIGHT: 278.69 LBS | BODY MASS INDEX: 41.28 KG/M2 | SYSTOLIC BLOOD PRESSURE: 138 MMHG | HEART RATE: 76 BPM

## 2022-10-14 DIAGNOSIS — E11.9 TYPE 2 DIABETES MELLITUS WITHOUT COMPLICATION, WITH LONG-TERM CURRENT USE OF INSULIN: ICD-10-CM

## 2022-10-14 DIAGNOSIS — K21.9 GASTROESOPHAGEAL REFLUX DISEASE WITHOUT ESOPHAGITIS: ICD-10-CM

## 2022-10-14 DIAGNOSIS — I10 ESSENTIAL HYPERTENSION: Primary | ICD-10-CM

## 2022-10-14 DIAGNOSIS — G47.33 OBSTRUCTIVE SLEEP APNEA: ICD-10-CM

## 2022-10-14 DIAGNOSIS — I25.118 CORONARY ARTERY DISEASE INVOLVING NATIVE CORONARY ARTERY OF NATIVE HEART WITH OTHER FORM OF ANGINA PECTORIS: ICD-10-CM

## 2022-10-14 DIAGNOSIS — Z79.4 TYPE 2 DIABETES MELLITUS WITHOUT COMPLICATION, WITH LONG-TERM CURRENT USE OF INSULIN: ICD-10-CM

## 2022-10-14 DIAGNOSIS — E78.00 HYPERCHOLESTEROLEMIA: ICD-10-CM

## 2022-10-14 PROCEDURE — 1159F MED LIST DOCD IN RCRD: CPT | Mod: CPTII,S$GLB,, | Performed by: INTERNAL MEDICINE

## 2022-10-14 PROCEDURE — G0008 FLU VACCINE (QUAD) GREATER THAN OR EQUAL TO 3YO PRESERVATIVE FREE IM: ICD-10-PCS | Mod: S$GLB,,, | Performed by: INTERNAL MEDICINE

## 2022-10-14 PROCEDURE — 4010F ACE/ARB THERAPY RXD/TAKEN: CPT | Mod: CPTII,S$GLB,, | Performed by: INTERNAL MEDICINE

## 2022-10-14 PROCEDURE — G0008 ADMIN INFLUENZA VIRUS VAC: HCPCS | Mod: S$GLB,,, | Performed by: INTERNAL MEDICINE

## 2022-10-14 PROCEDURE — 99214 OFFICE O/P EST MOD 30 MIN: CPT | Mod: S$GLB,,, | Performed by: INTERNAL MEDICINE

## 2022-10-14 PROCEDURE — 3075F SYST BP GE 130 - 139MM HG: CPT | Mod: CPTII,S$GLB,, | Performed by: INTERNAL MEDICINE

## 2022-10-14 PROCEDURE — 99999 PR PBB SHADOW E&M-EST. PATIENT-LVL IV: CPT | Mod: PBBFAC,,, | Performed by: INTERNAL MEDICINE

## 2022-10-14 PROCEDURE — 90686 FLU VACCINE (QUAD) GREATER THAN OR EQUAL TO 3YO PRESERVATIVE FREE IM: ICD-10-PCS | Mod: S$GLB,,, | Performed by: INTERNAL MEDICINE

## 2022-10-14 PROCEDURE — 99999 PR PBB SHADOW E&M-EST. PATIENT-LVL IV: ICD-10-PCS | Mod: PBBFAC,,, | Performed by: INTERNAL MEDICINE

## 2022-10-14 PROCEDURE — 99214 PR OFFICE/OUTPT VISIT, EST, LEVL IV, 30-39 MIN: ICD-10-PCS | Mod: S$GLB,,, | Performed by: INTERNAL MEDICINE

## 2022-10-14 PROCEDURE — 3052F HG A1C>EQUAL 8.0%<EQUAL 9.0%: CPT | Mod: CPTII,S$GLB,, | Performed by: INTERNAL MEDICINE

## 2022-10-14 PROCEDURE — 4010F PR ACE/ARB THEARPY RXD/TAKEN: ICD-10-PCS | Mod: CPTII,S$GLB,, | Performed by: INTERNAL MEDICINE

## 2022-10-14 PROCEDURE — 3075F PR MOST RECENT SYSTOLIC BLOOD PRESS GE 130-139MM HG: ICD-10-PCS | Mod: CPTII,S$GLB,, | Performed by: INTERNAL MEDICINE

## 2022-10-14 PROCEDURE — 3079F PR MOST RECENT DIASTOLIC BLOOD PRESSURE 80-89 MM HG: ICD-10-PCS | Mod: CPTII,S$GLB,, | Performed by: INTERNAL MEDICINE

## 2022-10-14 PROCEDURE — 90686 IIV4 VACC NO PRSV 0.5 ML IM: CPT | Mod: S$GLB,,, | Performed by: INTERNAL MEDICINE

## 2022-10-14 PROCEDURE — 1160F PR REVIEW ALL MEDS BY PRESCRIBER/CLIN PHARMACIST DOCUMENTED: ICD-10-PCS | Mod: CPTII,S$GLB,, | Performed by: INTERNAL MEDICINE

## 2022-10-14 PROCEDURE — 1159F PR MEDICATION LIST DOCUMENTED IN MEDICAL RECORD: ICD-10-PCS | Mod: CPTII,S$GLB,, | Performed by: INTERNAL MEDICINE

## 2022-10-14 PROCEDURE — 1160F RVW MEDS BY RX/DR IN RCRD: CPT | Mod: CPTII,S$GLB,, | Performed by: INTERNAL MEDICINE

## 2022-10-14 PROCEDURE — 3079F DIAST BP 80-89 MM HG: CPT | Mod: CPTII,S$GLB,, | Performed by: INTERNAL MEDICINE

## 2022-10-14 PROCEDURE — 3052F PR MOST RECENT HEMOGLOBIN A1C LEVEL 8.0 - < 9.0%: ICD-10-PCS | Mod: CPTII,S$GLB,, | Performed by: INTERNAL MEDICINE

## 2022-10-14 NOTE — PROGRESS NOTES
Patient ID: Rashel Munson III is a 62 y.o. male.    Chief Complaint: No chief complaint on file.      Assessment HPI / Notes Impression / Plan   Type 2 diabetes Last A1c 8.0.  Follows with Endocrinology. Bariatric surgery would definitely have a positive impact on diabetes.  Continue follow-up with Endocrinology   Hypertension Mostly 130/80s Weight loss will help lower blood pressure.  Continue current medications   Hyperlipidemia LDL at goal, triglycerides elevated.  Continue rosuvastatin and fenofibrate   Class 3 obesity Is considering bariatric surgery.  As long as cardiology is okay with the surgery I would be okay as well.  I would like to get his A1c down to 7 for better healing after the surgery.  This will have to be done mostly through diet      Dx and Orders   Diagnoses and all orders for this visit:    Essential hypertension    Hypercholesterolemia    Coronary artery disease involving native coronary artery of native heart with other form of angina pectoris    Type 2 diabetes mellitus without complication, with long-term current use of insulin    Gastroesophageal reflux disease without esophagitis    Obstructive sleep apnea    Other orders  -     Influenza - Quadrivalent (PF)        Review of Systems   Constitutional:  Negative for fever.   Respiratory:  Positive for shortness of breath.         Baseline dyspnea    Cardiovascular:  Negative for chest pain.   Gastrointestinal:  Negative for abdominal pain.   Vitals:    10/14/22 1127   BP: 138/80   Pulse: 76     Wt Readings from Last 3 Encounters:   10/14/22 1127 126.4 kg (278 lb 10.6 oz)   09/21/22 1507 125.6 kg (277 lb)   03/30/22 1019 126 kg (277 lb 12.5 oz)      Body mass index is 41.15 kg/m².   Physical Exam  Cardiovascular:      Rate and Rhythm: Normal rate and regular rhythm.      Heart sounds: No murmur heard.    No gallop.   Pulmonary:      Breath sounds: Normal breath sounds. No wheezing or rhonchi.   Abdominal:      Palpations: Abdomen is  soft.      Tenderness: There is no abdominal tenderness.   Musculoskeletal:         General: Normal range of motion.      Cervical back: Neck supple.   Skin:     General: Skin is warm.      Findings: No rash.   Neurological:      Mental Status: He is alert.   Psychiatric:         Behavior: Behavior normal.     Family History   Problem Relation Age of Onset    Hypertension Mother     Aneurysm Father     Hypertension Father     Deep vein thrombosis Father     Diabetes Sister     Hypertension Sister     No Known Problems Daughter     Macular degeneration Neg Hx     Retinal detachment Neg Hx     Glaucoma Neg Hx       Past Surgical History:   Procedure Laterality Date    ANGIOGRAM, CORONARY, WITH LEFT HEART CATHETERIZATION  9/10/2019    Procedure: Angiogram, Coronary, with Left Heart Cath;  Surgeon: Lennie Lua MD;  Location: [facility] CATH;  Service: Cardiology;;    ANGIOGRAM, CORONARY, WITH LEFT HEART CATHETERIZATION  9/24/2019    Procedure: Angiogram, Coronary, with Left Heart Cath;  Surgeon: Lennie Lua MD;  Location: [facility] CATH;  Service: Cardiology;;    CARDIAC CATHETERIZATION  2011    COLONOSCOPY W/ POLYPECTOMY      CORONARY STENT PLACEMENT N/A 9/10/2019    Procedure: INSERTION, STENT, CORONARY ARTERY;  Surgeon: Lennie Lua MD;  Location: [facility] CATH;  Service: Cardiology;  Laterality: N/A;    CORONARY STENT PLACEMENT N/A 9/24/2019    Procedure: INSERTION, STENT, CORONARY ARTERY;  Surgeon: Lennie Lua MD;  Location: [facility] CATH;  Service: Cardiology;  Laterality: N/A;    ESOPHAGOGASTRODUODENOSCOPY (EGD) WITH DILATION      LEFT HEART CATHETERIZATION Left 9/10/2019    Procedure: Left heart cath;  Surgeon: Lennie Lua MD;  Location: [facility] CATH;  Service: Cardiology;  Laterality: Left;      Diabetes Medications               dulaglutide (TRULICITY) 1.5 mg/0.5 mL pen injector Inject 1.5 mg into the skin every 7 days.    empagliflozin (JARDIANCE) 25 mg tablet Take 1 tablet (25 mg total) by mouth once daily.     glimepiride (AMARYL) 4 MG tablet Take 1 tablet (4 mg total) by mouth 2 (two) times daily with meals.    insulin (BASAGLAR KWIKPEN U-100 INSULIN) glargine 100 units/mL SubQ pen Inject 36 Units into the skin 2 (two) times a day.    insulin glargine U-300 conc (TOUJEO MAX U-300 SOLOSTAR) 300 unit/mL (3 mL) insulin pen Inject 72 Units into the skin once daily.    metFORMIN (GLUCOPHAGE) 1000 MG tablet Take 1 tablet (1,000 mg total) by mouth 2 (two) times daily with meals.           Hypertension Medications               amLODIPine (NORVASC) 5 MG tablet Take 1 tablet (5 mg total) by mouth once daily.    hydrALAZINE (APRESOLINE) 100 MG tablet Take 1 tablet (100 mg total) by mouth 2 (two) times daily.    metoprolol succinate (TOPROL XL) 100 MG 24 hr tablet Take 1 tablet (100 mg total) by mouth 2 (two) times daily.    nitroGLYCERIN (NITROSTAT) 0.4 MG SL tablet Place 1 tablet (0.4 mg total) under the tongue every 5 (five) minutes as needed.    spironolactone (ALDACTONE) 25 MG tablet Take 1 tablet (25 mg total) by mouth once daily.    valsartan (DIOVAN) 320 MG tablet Take 1 tablet (320 mg total) by mouth once daily.           Hyperlipidemia Medications               fenofibrate (TRICOR) 145 MG tablet Take 145 mg by mouth once daily.    rosuvastatin (CRESTOR) 20 MG tablet Take 20 mg by mouth once daily.           Medication List with Changes/Refills   Current Medications    AMLODIPINE (NORVASC) 5 MG TABLET    Take 1 tablet (5 mg total) by mouth once daily.    AMOXICILLIN (AMOXIL) 500 MG TAB    TAKE FOUR TS PO 1 HOUR B DAPP    ASPIRIN (ECOTRIN) 81 MG EC TABLET    Take 81 mg by mouth once daily.     DULAGLUTIDE (TRULICITY) 1.5 MG/0.5 ML PEN INJECTOR    Inject 1.5 mg into the skin every 7 days.    EMPAGLIFLOZIN (JARDIANCE) 25 MG TABLET    Take 1 tablet (25 mg total) by mouth once daily.    FENOFIBRATE (TRICOR) 145 MG TABLET    Take 145 mg by mouth once daily.    GLIMEPIRIDE (AMARYL) 4 MG TABLET    Take 1 tablet (4 mg total) by  "mouth 2 (two) times daily with meals.    HYDRALAZINE (APRESOLINE) 100 MG TABLET    Take 1 tablet (100 mg total) by mouth 2 (two) times daily.    INSULIN GLARGINE U-300 CONC (TOUJEO MAX U-300 SOLOSTAR) 300 UNIT/ML (3 ML) INSULIN PEN    Inject 72 Units into the skin once daily.    METFORMIN (GLUCOPHAGE) 1000 MG TABLET    Take 1 tablet (1,000 mg total) by mouth 2 (two) times daily with meals.    METOPROLOL SUCCINATE (TOPROL XL) 100 MG 24 HR TABLET    Take 1 tablet (100 mg total) by mouth 2 (two) times daily.    NITROGLYCERIN (NITROSTAT) 0.4 MG SL TABLET    Place 1 tablet (0.4 mg total) under the tongue every 5 (five) minutes as needed.    PANTOPRAZOLE (PROTONIX) 40 MG TABLET    Take 40 mg by mouth 2 (two) times daily.    PEN NEEDLE, DIABETIC (NOVOFINE 32) 32 GAUGE X 1/4" NDLE    USE 1 NEEDLE DAILY WITH INSULIN. DISPENSE GENERIC.    POTASSIUM CHLORIDE SA (K-DUR,KLOR-CON) 20 MEQ TABLET    Take 1 tablet (20 mEq total) by mouth once daily. with food.    ROSUVASTATIN (CRESTOR) 20 MG TABLET    Take 20 mg by mouth once daily.    SPIRONOLACTONE (ALDACTONE) 25 MG TABLET    Take 1 tablet (25 mg total) by mouth once daily.    VALSARTAN (DIOVAN) 320 MG TABLET    Take 1 tablet (320 mg total) by mouth once daily.   Discontinued Medications    INSULIN (BASAGLAR KWIKPEN U-100 INSULIN) GLARGINE 100 UNITS/ML SUBQ PEN    Inject 36 Units into the skin 2 (two) times a day.       I personally reviewed past medical, family and social history.                 "

## 2022-12-15 ENCOUNTER — LAB VISIT (OUTPATIENT)
Dept: LAB | Facility: CLINIC | Age: 62
End: 2022-12-15
Payer: MEDICARE

## 2022-12-15 DIAGNOSIS — E11.8 TYPE 2 DIABETES MELLITUS WITH COMPLICATION, WITHOUT LONG-TERM CURRENT USE OF INSULIN: ICD-10-CM

## 2022-12-15 LAB
ALBUMIN/CREAT UR: 6 UG/MG (ref 0–30)
CREAT UR-MCNC: 100 MG/DL (ref 23–375)
ESTIMATED AVG GLUCOSE: 123 MG/DL (ref 68–131)
HBA1C MFR BLD: 5.9 % (ref 4–5.6)
MICROALBUMIN UR DL<=1MG/L-MCNC: 6 UG/ML

## 2022-12-15 PROCEDURE — 36415 PR COLLECTION VENOUS BLOOD,VENIPUNCTURE: ICD-10-PCS | Mod: ,,, | Performed by: STUDENT IN AN ORGANIZED HEALTH CARE EDUCATION/TRAINING PROGRAM

## 2022-12-15 PROCEDURE — 36415 COLL VENOUS BLD VENIPUNCTURE: CPT | Mod: ,,, | Performed by: STUDENT IN AN ORGANIZED HEALTH CARE EDUCATION/TRAINING PROGRAM

## 2022-12-15 PROCEDURE — 82570 ASSAY OF URINE CREATININE: CPT | Performed by: NURSE PRACTITIONER

## 2022-12-15 PROCEDURE — 82043 UR ALBUMIN QUANTITATIVE: CPT | Performed by: NURSE PRACTITIONER

## 2022-12-15 PROCEDURE — 83036 HEMOGLOBIN GLYCOSYLATED A1C: CPT | Performed by: NURSE PRACTITIONER

## 2022-12-20 ENCOUNTER — OFFICE VISIT (OUTPATIENT)
Dept: ENDOCRINOLOGY | Facility: CLINIC | Age: 62
End: 2022-12-20
Payer: MEDICARE

## 2022-12-20 DIAGNOSIS — I25.10 CORONARY ARTERY DISEASE INVOLVING NATIVE CORONARY ARTERY OF NATIVE HEART WITHOUT ANGINA PECTORIS: ICD-10-CM

## 2022-12-20 DIAGNOSIS — E66.01 MORBID OBESITY WITH BMI OF 40.0-44.9, ADULT: ICD-10-CM

## 2022-12-20 DIAGNOSIS — I10 ESSENTIAL HYPERTENSION: ICD-10-CM

## 2022-12-20 DIAGNOSIS — E11.8 TYPE 2 DIABETES MELLITUS WITH COMPLICATION, WITHOUT LONG-TERM CURRENT USE OF INSULIN: Primary | ICD-10-CM

## 2022-12-20 DIAGNOSIS — E78.00 HYPERCHOLESTEROLEMIA: ICD-10-CM

## 2022-12-20 PROCEDURE — 4010F ACE/ARB THERAPY RXD/TAKEN: CPT | Mod: CPTII,95,, | Performed by: NURSE PRACTITIONER

## 2022-12-20 PROCEDURE — 4010F PR ACE/ARB THEARPY RXD/TAKEN: ICD-10-PCS | Mod: CPTII,95,, | Performed by: NURSE PRACTITIONER

## 2022-12-20 PROCEDURE — 1160F PR REVIEW ALL MEDS BY PRESCRIBER/CLIN PHARMACIST DOCUMENTED: ICD-10-PCS | Mod: CPTII,95,, | Performed by: NURSE PRACTITIONER

## 2022-12-20 PROCEDURE — 3066F PR DOCUMENTATION OF TREATMENT FOR NEPHROPATHY: ICD-10-PCS | Mod: CPTII,95,, | Performed by: NURSE PRACTITIONER

## 2022-12-20 PROCEDURE — 3061F NEG MICROALBUMINURIA REV: CPT | Mod: CPTII,95,, | Performed by: NURSE PRACTITIONER

## 2022-12-20 PROCEDURE — 1159F PR MEDICATION LIST DOCUMENTED IN MEDICAL RECORD: ICD-10-PCS | Mod: CPTII,95,, | Performed by: NURSE PRACTITIONER

## 2022-12-20 PROCEDURE — 1159F MED LIST DOCD IN RCRD: CPT | Mod: CPTII,95,, | Performed by: NURSE PRACTITIONER

## 2022-12-20 PROCEDURE — 3044F HG A1C LEVEL LT 7.0%: CPT | Mod: CPTII,95,, | Performed by: NURSE PRACTITIONER

## 2022-12-20 PROCEDURE — 3066F NEPHROPATHY DOC TX: CPT | Mod: CPTII,95,, | Performed by: NURSE PRACTITIONER

## 2022-12-20 PROCEDURE — 99214 OFFICE O/P EST MOD 30 MIN: CPT | Mod: 95,,, | Performed by: NURSE PRACTITIONER

## 2022-12-20 PROCEDURE — 99214 PR OFFICE/OUTPT VISIT, EST, LEVL IV, 30-39 MIN: ICD-10-PCS | Mod: 95,,, | Performed by: NURSE PRACTITIONER

## 2022-12-20 PROCEDURE — 1160F RVW MEDS BY RX/DR IN RCRD: CPT | Mod: CPTII,95,, | Performed by: NURSE PRACTITIONER

## 2022-12-20 PROCEDURE — 3044F PR MOST RECENT HEMOGLOBIN A1C LEVEL <7.0%: ICD-10-PCS | Mod: CPTII,95,, | Performed by: NURSE PRACTITIONER

## 2022-12-20 PROCEDURE — 3061F PR NEG MICROALBUMINURIA RESULT DOCUMENTED/REVIEW: ICD-10-PCS | Mod: CPTII,95,, | Performed by: NURSE PRACTITIONER

## 2022-12-20 NOTE — PROGRESS NOTES
The patient location is:  Patient Home   The chief complaint leading to consultation is: Type 2 DM  Visit type: Virtual visit with synchronous audio and video  Total time spent with patient: 20 min  Each patient to whom he or she provides medical services by telemedicine is:  (1) informed of the relationship between the physician and patient and the respective role of any other health care provider with respect to management of the patient; and (2) notified that he or she may decline to receive medical services by telemedicine and may withdraw from such care at any time.       CC: Mr. Rashel Munson III arrives today for management of Type 2 DM and review of chronic medical conditions, as listed in the Visit Diagnosis section of this encounter.       HPI: Mr. Rashel Munson III was diagnosed with Type 2 DM in ~. He was diagnosed based on lab work. Initial treatment consisted of metformin. + FH of DM in maternal GM and uncles. Denies hospitalizations due to DM.   He has CAD and recently with 5 stent placements. Follows with Dr. Mccallum.     Patient was last seen by me in September.     He made significant dietary changes over the past 2 months. He has lost 15#.     He has not given Toujeo in 2 days and BG are ranging . Most recent glucose is 76 this afternoon after lunch.     BG readings are checked 1x/day. No logs for review. Reports the following:   Fastin-120  After lunch: 90s    Hypoglycemia: Reports some borderline in the 70s   Symptoms: lightheaded   Treatment: juice    Missing Insulin/PO medication doses: Yes - has been holding meds if BG is low normal.     Exercise: No formal     Dietary Habits: Eats 3 meals/day. Has reduced carb content. Has reduced snacking and is choosing lower CHO snack.  Drinks occasional milk.    Last DM education appointment: 2020      CURRENT DIABETIC MEDS: metformin 1000 mg BID, glimepiride 4 mg BID, Jardiance 25 mg daily, Trulicity 1.5 mg weekly, Toujeo Max 72  units QAM  Vial or pen: n/a  Glucometer type: Darlyn    Previous DM treatments:  Glyburide   Ozempic - expensive  Tresiba     Last Eye Exam: 5/2022, no DR   Last Podiatry Exam: n/a    REVIEW OF SYSTEMS  Constitutional: no c/o fatigue, weakness. + intentional weight loss.   Cardiac: no palpitations or chest pain.  Respiratory: denies cough. + chronic intermittent dyspnea on exertion.   GI: no c/o abdominal pain or nausea. Denies h/o pancreatitis.    Skin: no lesions or rashes.  Neuro: + rare instances of numbness to feet  Endocrine: denies polyphagia, polydipsia, polyuria. Denies personal or family h/o MTC, MEN2.      Personally reviewed Past Medical, Surgical, Social History.    Vital Signs  There were no vitals taken for this visit.  -- video visit     Personally reviewed the below labs:    Hemoglobin A1C   Date Value Ref Range Status   12/15/2022 5.9 (H) 4.0 - 5.6 % Final     Comment:     ADA Screening Guidelines:  5.7-6.4%  Consistent with prediabetes  >or=6.5%  Consistent with diabetes    High levels of fetal hemoglobin interfere with the HbA1C  assay. Heterozygous hemoglobin variants (HbS, HgC, etc)do  not significantly interfere with this assay.   However, presence of multiple variants may affect accuracy.     09/15/2022 8.1 (H) 4.0 - 5.6 % Final     Comment:     ADA Screening Guidelines:  5.7-6.4%  Consistent with prediabetes  >or=6.5%  Consistent with diabetes    High levels of fetal hemoglobin interfere with the HbA1C  assay. Heterozygous hemoglobin variants (HbS, HgC, etc)do  not significantly interfere with this assay.   However, presence of multiple variants may affect accuracy.     09/15/2022 8.0 (H) 4.0 - 5.6 % Final     Comment:     ADA Screening Guidelines:  5.7-6.4%  Consistent with prediabetes  >or=6.5%  Consistent with diabetes    High levels of fetal hemoglobin interfere with the HbA1C  assay. Heterozygous hemoglobin variants (HbS, HgC, etc)do  not significantly interfere with this assay.    However, presence of multiple variants may affect accuracy.         Chemistry        Component Value Date/Time     09/15/2022 0954     09/15/2022 0954    K 3.9 09/15/2022 0954    K 4.0 09/15/2022 0954     09/15/2022 0954     09/15/2022 0954    CO2 24 09/15/2022 0954    CO2 24 09/15/2022 0954    BUN 21 09/15/2022 0954    BUN 20 09/15/2022 0954    CREATININE 1.1 09/15/2022 0954    CREATININE 1.2 09/15/2022 0954     (H) 09/15/2022 0954     (H) 09/15/2022 0954        Component Value Date/Time    CALCIUM 9.4 09/15/2022 0954    CALCIUM 9.6 09/15/2022 0954    ALKPHOS 57 09/15/2022 0954    AST 20 09/15/2022 0954    ALT 21 09/15/2022 0954    BILITOT 0.6 09/15/2022 0954    ESTGFRAFRICA >60.0 06/16/2022 0923    EGFRNONAA >60.0 06/16/2022 0923          Lab Results   Component Value Date    CHOL 98 (L) 06/16/2022    CHOL 99 (L) 03/03/2022    CHOL 118 (L) 12/02/2021     Lab Results   Component Value Date    HDL 26 (L) 06/16/2022    HDL 24 (L) 03/03/2022    HDL 25 (L) 12/02/2021     Lab Results   Component Value Date    LDLCALC 10.0 (L) 06/16/2022    LDLCALC Invalid, Trig>400.0 03/03/2022    LDLCALC Invalid, Trig>400.0 12/02/2021     Lab Results   Component Value Date    TRIG 310 (H) 06/16/2022    TRIG 417 (H) 03/03/2022    TRIG 461 (H) 12/02/2021     Lab Results   Component Value Date    CHOLHDL 26.5 06/16/2022    CHOLHDL 24.2 03/03/2022    CHOLHDL 21.2 12/02/2021       Lab Results   Component Value Date    MICALBCREAT 6.0 12/15/2022     Lab Results   Component Value Date    TSH 1.620 09/15/2022       CrCl cannot be calculated (Patient's most recent lab result is older than the maximum 7 days allowed.).    Vit D, 25-Hydroxy   Date Value Ref Range Status   09/15/2022 28 (L) 30 - 96 ng/mL Final     Comment:     Vitamin D deficiency.........<10 ng/mL                              Vitamin D insufficiency......10-29 ng/mL       Vitamin D sufficiency........> or equal to 30 ng/mL  Vitamin D  toxicity............>100 ng/mL           PHYSICAL EXAMINATION  Deferred - video visit      Goals        HEMOGLOBIN A1C < 7                 Assessment/Plan  1. Type 2 diabetes mellitus with complication, without long-term current use of insulin  -- Controlled. Much less insulin resistance after weight loss. Glucose currently 76 and last insulin dose was > 48 hours ago.   -- hold Toujeo Max  -- continue Jardiance, glimepiride, metformin, Trulicity  -- send update in 2 weeks so insulin needs can be determined.  -- check BG 2x/day   2. Coronary artery disease involving native coronary artery of native heart without angina pectoris  -- optimize DM control.  -- follows with cardiology   3. Hypertension  -- continue current meds. Managed by cardiology   4. Hypercholesterolemia  -- uncontrolled with elevated TRG  -- taking Crestor, fenofibrate   -- expect decrease since he has changed diet.  -- follows with cardiology   5. Morbid obesity with BMI of 40.0-44.9, adult  -- improving  -- increasing insulin resistance.        FOLLOW UP  Follow up in about 3 months (around 3/20/2023).   Patient instructed to bring BG logs to each follow up   Patient encouraged to call for any BG/medication issues, concerns, or questions.    Orders Placed This Encounter   Procedures    Hemoglobin A1C    Comprehensive Metabolic Panel

## 2023-01-30 ENCOUNTER — TELEPHONE (OUTPATIENT)
Dept: ENDOCRINOLOGY | Facility: CLINIC | Age: 63
End: 2023-01-30
Payer: MEDICARE

## 2023-01-30 NOTE — TELEPHONE ENCOUNTER
Pt received Trulicity through Pt Assistance last year. States he will have a couple more pens left as of now and would like to renew that program    Could someone in your dept reach out to him for assistance please

## 2023-01-30 NOTE — TELEPHONE ENCOUNTER
----- Message from Rhiannon Corey sent at 1/30/2023  2:21 PM CST -----  .Type:  Patient Call Back    Who Called: PT       Does the patient know what this is regarding?: PT HASN'T RECEIVED THE TRULICITY PROGRAM PAPERWORK FOR 2023 PT HAS 2 SHOTS LEFT PLEASE REACH OUT TO PT ON THIS MATTER     Would the patient rather a call back YES     Best Call Back Number:  [phone]    Additional Information: Thank You

## 2023-03-08 ENCOUNTER — PES CALL (OUTPATIENT)
Dept: ADMINISTRATIVE | Facility: CLINIC | Age: 63
End: 2023-03-08
Payer: MEDICARE

## 2023-03-16 ENCOUNTER — LAB VISIT (OUTPATIENT)
Dept: LAB | Facility: CLINIC | Age: 63
End: 2023-03-16
Payer: MEDICARE

## 2023-03-16 DIAGNOSIS — E11.8 TYPE 2 DIABETES MELLITUS WITH COMPLICATION, WITHOUT LONG-TERM CURRENT USE OF INSULIN: ICD-10-CM

## 2023-03-16 LAB
ALBUMIN SERPL BCP-MCNC: 3.7 G/DL (ref 3.5–5.2)
ALP SERPL-CCNC: 59 U/L (ref 55–135)
ALT SERPL W/O P-5'-P-CCNC: 24 U/L (ref 10–44)
ANION GAP SERPL CALC-SCNC: 12 MMOL/L (ref 8–16)
AST SERPL-CCNC: 22 U/L (ref 10–40)
BILIRUB SERPL-MCNC: 0.5 MG/DL (ref 0.1–1)
BUN SERPL-MCNC: 19 MG/DL (ref 8–23)
CALCIUM SERPL-MCNC: 9.5 MG/DL (ref 8.7–10.5)
CHLORIDE SERPL-SCNC: 103 MMOL/L (ref 95–110)
CO2 SERPL-SCNC: 26 MMOL/L (ref 23–29)
CREAT SERPL-MCNC: 1.2 MG/DL (ref 0.5–1.4)
EST. GFR  (NO RACE VARIABLE): >60 ML/MIN/1.73 M^2
ESTIMATED AVG GLUCOSE: 163 MG/DL (ref 68–131)
GLUCOSE SERPL-MCNC: 130 MG/DL (ref 70–110)
HBA1C MFR BLD: 7.3 % (ref 4–5.6)
POTASSIUM SERPL-SCNC: 4.6 MMOL/L (ref 3.5–5.1)
PROT SERPL-MCNC: 6.8 G/DL (ref 6–8.4)
SODIUM SERPL-SCNC: 141 MMOL/L (ref 136–145)

## 2023-03-16 PROCEDURE — 80053 COMPREHEN METABOLIC PANEL: CPT | Performed by: NURSE PRACTITIONER

## 2023-03-16 PROCEDURE — 83036 HEMOGLOBIN GLYCOSYLATED A1C: CPT | Performed by: NURSE PRACTITIONER

## 2023-03-16 PROCEDURE — 36415 PR COLLECTION VENOUS BLOOD,VENIPUNCTURE: ICD-10-PCS | Mod: ,,, | Performed by: STUDENT IN AN ORGANIZED HEALTH CARE EDUCATION/TRAINING PROGRAM

## 2023-03-16 PROCEDURE — 36415 COLL VENOUS BLD VENIPUNCTURE: CPT | Mod: ,,, | Performed by: STUDENT IN AN ORGANIZED HEALTH CARE EDUCATION/TRAINING PROGRAM

## 2023-03-22 ENCOUNTER — OFFICE VISIT (OUTPATIENT)
Dept: ENDOCRINOLOGY | Facility: CLINIC | Age: 63
End: 2023-03-22
Payer: MEDICARE

## 2023-03-22 DIAGNOSIS — I10 ESSENTIAL HYPERTENSION: ICD-10-CM

## 2023-03-22 DIAGNOSIS — I25.10 CORONARY ARTERY DISEASE INVOLVING NATIVE CORONARY ARTERY OF NATIVE HEART WITHOUT ANGINA PECTORIS: ICD-10-CM

## 2023-03-22 DIAGNOSIS — E11.8 TYPE 2 DIABETES MELLITUS WITH COMPLICATION, WITHOUT LONG-TERM CURRENT USE OF INSULIN: Primary | ICD-10-CM

## 2023-03-22 DIAGNOSIS — E78.00 HYPERCHOLESTEROLEMIA: ICD-10-CM

## 2023-03-22 DIAGNOSIS — E66.01 MORBID OBESITY WITH BMI OF 40.0-44.9, ADULT: ICD-10-CM

## 2023-03-22 PROCEDURE — 1159F MED LIST DOCD IN RCRD: CPT | Mod: CPTII,95,, | Performed by: NURSE PRACTITIONER

## 2023-03-22 PROCEDURE — 3072F PR LOW RISK FOR RETINOPATHY: ICD-10-PCS | Mod: CPTII,95,, | Performed by: NURSE PRACTITIONER

## 2023-03-22 PROCEDURE — 1160F RVW MEDS BY RX/DR IN RCRD: CPT | Mod: CPTII,95,, | Performed by: NURSE PRACTITIONER

## 2023-03-22 PROCEDURE — 99214 OFFICE O/P EST MOD 30 MIN: CPT | Mod: 95,,, | Performed by: NURSE PRACTITIONER

## 2023-03-22 PROCEDURE — 1160F PR REVIEW ALL MEDS BY PRESCRIBER/CLIN PHARMACIST DOCUMENTED: ICD-10-PCS | Mod: CPTII,95,, | Performed by: NURSE PRACTITIONER

## 2023-03-22 PROCEDURE — 3051F HG A1C>EQUAL 7.0%<8.0%: CPT | Mod: CPTII,95,, | Performed by: NURSE PRACTITIONER

## 2023-03-22 PROCEDURE — 3051F PR MOST RECENT HEMOGLOBIN A1C LEVEL 7.0 - < 8.0%: ICD-10-PCS | Mod: CPTII,95,, | Performed by: NURSE PRACTITIONER

## 2023-03-22 PROCEDURE — 3072F LOW RISK FOR RETINOPATHY: CPT | Mod: CPTII,95,, | Performed by: NURSE PRACTITIONER

## 2023-03-22 PROCEDURE — 1159F PR MEDICATION LIST DOCUMENTED IN MEDICAL RECORD: ICD-10-PCS | Mod: CPTII,95,, | Performed by: NURSE PRACTITIONER

## 2023-03-22 PROCEDURE — 99214 PR OFFICE/OUTPT VISIT, EST, LEVL IV, 30-39 MIN: ICD-10-PCS | Mod: 95,,, | Performed by: NURSE PRACTITIONER

## 2023-03-22 RX ORDER — TIRZEPATIDE 2.5 MG/.5ML
2.5 INJECTION, SOLUTION SUBCUTANEOUS
Qty: 4 PEN | Refills: 0 | Status: SHIPPED | OUTPATIENT
Start: 2023-03-22 | End: 2023-04-10

## 2023-03-22 NOTE — PROGRESS NOTES
The patient location is:  Patient Home   The chief complaint leading to consultation is: Type 2 DM  Visit type: Virtual visit with synchronous audio and video  Total time spent with patient: 20 min  Each patient to whom he or she provides medical services by telemedicine is:  (1) informed of the relationship between the physician and patient and the respective role of any other health care provider with respect to management of the patient; and (2) notified that he or she may decline to receive medical services by telemedicine and may withdraw from such care at any time.       CC: Mr. Rashel Munson III arrives today for management of Type 2 DM and review of chronic medical conditions, as listed in the Visit Diagnosis section of this encounter.       HPI: Mr. Rashel Munson III was diagnosed with Type 2 DM in ~. He was diagnosed based on lab work. Initial treatment consisted of metformin. + FH of DM in maternal GM and uncles. Denies hospitalizations due to DM.   He has CAD and recently with 5 stent placements.     Patient was last seen by me in December. At that time, Toujeo Max was discontinued, due to low blood sugars following weight loss.    He has not been as strict with his diet. He has been eating more bread than previously. Has gained some weight back and has resumed Toujeo Max at a lower dose.     Has been out of Trulicity for 1.5 months. Receives through PAP. Message was sent to that team to initiate renewal process but no progress has been made.     BG readings are checked 1x/day. No logs for review. Reports the following:   Fastin-140    Hypoglycemia: Denies  Symptoms: lightheaded   Treatment: juice    Missing Insulin/PO medication doses: Rare. However, is out of Trulicity.     Exercise: No formal     Dietary Habits: Eats 3 meals/day. Snacks on nuts.  Drinks occasional milk.    Last DM education appointment: 2020    Now following with Dr. Ogden and undergoing workup for dyspnea.          CURRENT DIABETIC MEDS: metformin 1000 mg BID, glimepiride 4 mg BID, Jardiance 25 mg daily, Trulicity 1.5 mg weekly (currently off), Toujeo Max 42 units QAM  Vial or pen: n/a  Glucometer type: Darlyn    Previous DM treatments:  Glyburide   Ozempic - expensive  Tresiba     Last Eye Exam: 5/2022, no DR   Last Podiatry Exam: n/a    REVIEW OF SYSTEMS  Constitutional: no c/o fatigue, weakness, weight loss. + mild weight gain (has gained some of what he had lost).  Cardiac: no palpitations or chest pain.  Respiratory: denies cough. + chronic intermittent dyspnea on exertion.   GI: no c/o abdominal pain or nausea. Denies h/o pancreatitis.    Skin: no lesions or rashes.  Neuro: no c/o numbness, tingling, paresthesias.   Endocrine: denies polyphagia, polydipsia, polyuria. Denies personal or family h/o MTC, MEN2.      Personally reviewed Past Medical, Surgical, Social History.    Vital Signs  There were no vitals taken for this visit.  -- video visit     Personally reviewed the below labs:    Hemoglobin A1C   Date Value Ref Range Status   03/16/2023 7.3 (H) 4.0 - 5.6 % Final     Comment:     ADA Screening Guidelines:  5.7-6.4%  Consistent with prediabetes  >or=6.5%  Consistent with diabetes    High levels of fetal hemoglobin interfere with the HbA1C  assay. Heterozygous hemoglobin variants (HbS, HgC, etc)do  not significantly interfere with this assay.   However, presence of multiple variants may affect accuracy.     12/15/2022 5.9 (H) 4.0 - 5.6 % Final     Comment:     ADA Screening Guidelines:  5.7-6.4%  Consistent with prediabetes  >or=6.5%  Consistent with diabetes    High levels of fetal hemoglobin interfere with the HbA1C  assay. Heterozygous hemoglobin variants (HbS, HgC, etc)do  not significantly interfere with this assay.   However, presence of multiple variants may affect accuracy.     09/15/2022 8.1 (H) 4.0 - 5.6 % Final     Comment:     ADA Screening Guidelines:  5.7-6.4%  Consistent with  prediabetes  >or=6.5%  Consistent with diabetes    High levels of fetal hemoglobin interfere with the HbA1C  assay. Heterozygous hemoglobin variants (HbS, HgC, etc)do  not significantly interfere with this assay.   However, presence of multiple variants may affect accuracy.     09/15/2022 8.0 (H) 4.0 - 5.6 % Final     Comment:     ADA Screening Guidelines:  5.7-6.4%  Consistent with prediabetes  >or=6.5%  Consistent with diabetes    High levels of fetal hemoglobin interfere with the HbA1C  assay. Heterozygous hemoglobin variants (HbS, HgC, etc)do  not significantly interfere with this assay.   However, presence of multiple variants may affect accuracy.         Chemistry        Component Value Date/Time     03/16/2023 1032    K 4.6 03/16/2023 1032     03/16/2023 1032    CO2 26 03/16/2023 1032    BUN 19 03/16/2023 1032    CREATININE 1.2 03/16/2023 1032     (H) 03/16/2023 1032        Component Value Date/Time    CALCIUM 9.5 03/16/2023 1032    ALKPHOS 59 03/16/2023 1032    AST 22 03/16/2023 1032    ALT 24 03/16/2023 1032    BILITOT 0.5 03/16/2023 1032    ESTGFRAFRICA >60.0 06/16/2022 0923    EGFRNONAA >60.0 06/16/2022 0923          Lab Results   Component Value Date    CHOL 98 (L) 06/16/2022    CHOL 99 (L) 03/03/2022    CHOL 118 (L) 12/02/2021     Lab Results   Component Value Date    HDL 26 (L) 06/16/2022    HDL 24 (L) 03/03/2022    HDL 25 (L) 12/02/2021     Lab Results   Component Value Date    LDLCALC 10.0 (L) 06/16/2022    LDLCALC Invalid, Trig>400.0 03/03/2022    LDLCALC Invalid, Trig>400.0 12/02/2021     Lab Results   Component Value Date    TRIG 310 (H) 06/16/2022    TRIG 417 (H) 03/03/2022    TRIG 461 (H) 12/02/2021     Lab Results   Component Value Date    CHOLHDL 26.5 06/16/2022    CHOLHDL 24.2 03/03/2022    CHOLHDL 21.2 12/02/2021       Lab Results   Component Value Date    MICALBCREAT 6.0 12/15/2022     Lab Results   Component Value Date    TSH 1.620 09/15/2022       CrCl cannot be  calculated (Unknown ideal weight.).    Vit D, 25-Hydroxy   Date Value Ref Range Status   09/15/2022 28 (L) 30 - 96 ng/mL Final     Comment:     Vitamin D deficiency.........<10 ng/mL                              Vitamin D insufficiency......10-29 ng/mL       Vitamin D sufficiency........> or equal to 30 ng/mL  Vitamin D toxicity............>100 ng/mL           PHYSICAL EXAMINATION  Deferred - video visit      Goals        HEMOGLOBIN A1C < 7                 Assessment/Plan  1. Type 2 diabetes mellitus with complication, without long-term current use of insulin  -- Uncontrolled. I recommended Mounjaro to assist with blood sugar control and weight loss. Explained that this is branded so cost is a concern but patient would like to try it. Goal would be to eliminate one of the more expensive meds (Toujeo Max or Jardiance) if Mounjaro is effective.   -- start Mounjaro 2.5 mg weekly. Will increase to 5 mg after first month.   -- remain off of Trulicity.   -- continue Jardiance, glimepiride, metformin, Toujeo max at current doses.   -- send update in 3 weeks into Mounjaro so higher dose can be sent to [facility].   -- check BG 2x/day   2. Coronary artery disease involving native coronary artery of native heart without angina pectoris  -- optimize DM control.  -- follows with cardiology   3. Hypertension  -- continue current meds. Managed by cardiology   4. Hypercholesterolemia  -- uncontrolled with elevated TRG  -- taking Crestor, fenofibrate   -- follows with cardiology  -- lipid panel with RTC   5. Morbid obesity with BMI of 40.0-44.9, adult  -- uncontrolled  -- increasing insulin resistance.        FOLLOW UP  Follow up in about 3 months (around 6/22/2023).   Patient instructed to bring BG logs to each follow up   Patient encouraged to call for any BG/medication issues, concerns, or questions.    Orders Placed This Encounter   Procedures    Hemoglobin A1C    Lipid Panel

## 2023-04-05 ENCOUNTER — PES CALL (OUTPATIENT)
Dept: ADMINISTRATIVE | Facility: CLINIC | Age: 63
End: 2023-04-05
Payer: MEDICARE

## 2023-04-10 ENCOUNTER — PATIENT MESSAGE (OUTPATIENT)
Dept: ENDOCRINOLOGY | Facility: CLINIC | Age: 63
End: 2023-04-10
Payer: MEDICARE

## 2023-04-10 DIAGNOSIS — E11.8 TYPE 2 DIABETES MELLITUS WITH COMPLICATION, WITHOUT LONG-TERM CURRENT USE OF INSULIN: Primary | ICD-10-CM

## 2023-04-10 RX ORDER — TIRZEPATIDE 5 MG/.5ML
5 INJECTION, SOLUTION SUBCUTANEOUS
Qty: 12 PEN | Refills: 3 | Status: SHIPPED | OUTPATIENT
Start: 2023-04-10 | End: 2023-06-27

## 2023-04-11 ENCOUNTER — PATIENT MESSAGE (OUTPATIENT)
Dept: ADMINISTRATIVE | Facility: HOSPITAL | Age: 63
End: 2023-04-11
Payer: MEDICARE

## 2023-04-20 ENCOUNTER — OFFICE VISIT (OUTPATIENT)
Dept: FAMILY MEDICINE | Facility: CLINIC | Age: 63
End: 2023-04-20
Payer: MEDICARE

## 2023-04-20 VITALS
HEART RATE: 80 BPM | BODY MASS INDEX: 41.56 KG/M2 | DIASTOLIC BLOOD PRESSURE: 81 MMHG | SYSTOLIC BLOOD PRESSURE: 136 MMHG | WEIGHT: 280.63 LBS | OXYGEN SATURATION: 96 % | HEIGHT: 69 IN

## 2023-04-20 DIAGNOSIS — Z79.4 TYPE 2 DIABETES MELLITUS WITHOUT COMPLICATION, WITH LONG-TERM CURRENT USE OF INSULIN: ICD-10-CM

## 2023-04-20 DIAGNOSIS — Z12.5 SCREENING FOR PROSTATE CANCER: ICD-10-CM

## 2023-04-20 DIAGNOSIS — E11.9 TYPE 2 DIABETES MELLITUS WITHOUT COMPLICATION, WITH LONG-TERM CURRENT USE OF INSULIN: ICD-10-CM

## 2023-04-20 DIAGNOSIS — I10 ESSENTIAL HYPERTENSION: ICD-10-CM

## 2023-04-20 PROCEDURE — 1159F PR MEDICATION LIST DOCUMENTED IN MEDICAL RECORD: ICD-10-PCS | Mod: CPTII,S$GLB,, | Performed by: INTERNAL MEDICINE

## 2023-04-20 PROCEDURE — 4010F PR ACE/ARB THEARPY RXD/TAKEN: ICD-10-PCS | Mod: CPTII,S$GLB,, | Performed by: INTERNAL MEDICINE

## 2023-04-20 PROCEDURE — 4010F ACE/ARB THERAPY RXD/TAKEN: CPT | Mod: CPTII,S$GLB,, | Performed by: INTERNAL MEDICINE

## 2023-04-20 PROCEDURE — 3075F PR MOST RECENT SYSTOLIC BLOOD PRESS GE 130-139MM HG: ICD-10-PCS | Mod: CPTII,S$GLB,, | Performed by: INTERNAL MEDICINE

## 2023-04-20 PROCEDURE — 99213 PR OFFICE/OUTPT VISIT, EST, LEVL III, 20-29 MIN: ICD-10-PCS | Mod: S$GLB,,, | Performed by: INTERNAL MEDICINE

## 2023-04-20 PROCEDURE — 3079F PR MOST RECENT DIASTOLIC BLOOD PRESSURE 80-89 MM HG: ICD-10-PCS | Mod: CPTII,S$GLB,, | Performed by: INTERNAL MEDICINE

## 2023-04-20 PROCEDURE — 1159F MED LIST DOCD IN RCRD: CPT | Mod: CPTII,S$GLB,, | Performed by: INTERNAL MEDICINE

## 2023-04-20 PROCEDURE — 3075F SYST BP GE 130 - 139MM HG: CPT | Mod: CPTII,S$GLB,, | Performed by: INTERNAL MEDICINE

## 2023-04-20 PROCEDURE — 3051F PR MOST RECENT HEMOGLOBIN A1C LEVEL 7.0 - < 8.0%: ICD-10-PCS | Mod: CPTII,S$GLB,, | Performed by: INTERNAL MEDICINE

## 2023-04-20 PROCEDURE — 99999 PR PBB SHADOW E&M-EST. PATIENT-LVL IV: ICD-10-PCS | Mod: PBBFAC,,, | Performed by: INTERNAL MEDICINE

## 2023-04-20 PROCEDURE — 99999 PR PBB SHADOW E&M-EST. PATIENT-LVL IV: CPT | Mod: PBBFAC,,, | Performed by: INTERNAL MEDICINE

## 2023-04-20 PROCEDURE — 3072F LOW RISK FOR RETINOPATHY: CPT | Mod: CPTII,S$GLB,, | Performed by: INTERNAL MEDICINE

## 2023-04-20 PROCEDURE — 3051F HG A1C>EQUAL 7.0%<8.0%: CPT | Mod: CPTII,S$GLB,, | Performed by: INTERNAL MEDICINE

## 2023-04-20 PROCEDURE — 3079F DIAST BP 80-89 MM HG: CPT | Mod: CPTII,S$GLB,, | Performed by: INTERNAL MEDICINE

## 2023-04-20 PROCEDURE — 1160F PR REVIEW ALL MEDS BY PRESCRIBER/CLIN PHARMACIST DOCUMENTED: ICD-10-PCS | Mod: CPTII,S$GLB,, | Performed by: INTERNAL MEDICINE

## 2023-04-20 PROCEDURE — 3008F BODY MASS INDEX DOCD: CPT | Mod: CPTII,S$GLB,, | Performed by: INTERNAL MEDICINE

## 2023-04-20 PROCEDURE — 3008F PR BODY MASS INDEX (BMI) DOCUMENTED: ICD-10-PCS | Mod: CPTII,S$GLB,, | Performed by: INTERNAL MEDICINE

## 2023-04-20 PROCEDURE — 99213 OFFICE O/P EST LOW 20 MIN: CPT | Mod: S$GLB,,, | Performed by: INTERNAL MEDICINE

## 2023-04-20 PROCEDURE — 1160F RVW MEDS BY RX/DR IN RCRD: CPT | Mod: CPTII,S$GLB,, | Performed by: INTERNAL MEDICINE

## 2023-04-20 PROCEDURE — 3072F PR LOW RISK FOR RETINOPATHY: ICD-10-PCS | Mod: CPTII,S$GLB,, | Performed by: INTERNAL MEDICINE

## 2023-04-20 NOTE — PROGRESS NOTES
Patient ID: Rashel Munson III is a 63 y.o. male.    Chief Complaint: Hypertension (Follow up)        HPI / Assessment and Plan      Saw Dr. Ogden for 2nd opinion on dyspnea.  Echo scheduled. had stress EKG that was positive for ischemia. A1c 7.3 was 5.9. now on mounjaro. Following with endo. He is probably not going to go bariatric route. Hypertension is controlled so will continue current regimen. Requesting digital med program     1. Essential hypertension   - cont current regimen    2. Type 2 diabetes mellitus without complication, with long-term current use of insulin   - controlled, continue current regimen    3. Screening for prostate cancer   - check psa in sept.          Review of Systems   Constitutional:  Negative for fever.   Respiratory:  Positive for shortness of breath.    Cardiovascular:  Negative for chest pain.   Gastrointestinal:  Negative for abdominal pain.      Objective     Vitals:    04/20/23 1106   BP: 136/81   Pulse: 80     Wt Readings from Last 3 Encounters:   04/20/23 1106 127.3 kg (280 lb 10.3 oz)   03/21/23 1430 126.1 kg (278 lb)   10/14/22 1127 126.4 kg (278 lb 10.6 oz)      Body mass index is 41.44 kg/m².   Physical Exam  Cardiovascular:      Rate and Rhythm: Normal rate and regular rhythm.      Heart sounds: No murmur heard.    No gallop.   Pulmonary:      Breath sounds: Normal breath sounds. No wheezing or rhonchi.   Abdominal:      Palpations: Abdomen is soft.      Tenderness: There is no abdominal tenderness.   Musculoskeletal:         General: Normal range of motion.      Cervical back: Neck supple.   Skin:     General: Skin is warm.      Findings: No rash.   Neurological:      Mental Status: He is alert.   Psychiatric:         Behavior: Behavior normal.        Mariana Kiser was seen today for hypertension.    Diagnoses and all orders for this visit:    Essential hypertension  -     Hypertension Digital Medicine (HDMP) Enrollment Order  -     Hypertension Digital Medicine  (HDMP): Assign Onboarding Questionnaires    Type 2 diabetes mellitus without complication, with long-term current use of insulin  -     Diabetes Digital Medicine (DDMP) Enrollment Order  -     Diabetes Digital Medicine (DDMP): Assign Onboarding Questionnaires    Screening for prostate cancer  -     PSA, Screening; Future         Diabetes Medications               empagliflozin (JARDIANCE) 25 mg tablet Take 1 tablet (25 mg total) by mouth once daily.    glimepiride (AMARYL) 4 MG tablet Take 1 tablet (4 mg total) by mouth 2 (two) times daily with meals.    insulin glargine U-300 conc (TOUJEO MAX U-300 SOLOSTAR) 300 unit/mL (3 mL) insulin pen Inject 72 Units into the skin once daily.    metFORMIN (GLUCOPHAGE) 1000 MG tablet TAKE 1 TABLET TWICE A DAY WITH MEALS    tirzepatide (MOUNJARO) 5 mg/0.5 mL PnIj Inject 5 mg into the skin every 7 days.           Hypertension Medications               amLODIPine (NORVASC) 5 MG tablet TAKE 1 TABLET DAILY    hydrALAZINE (APRESOLINE) 100 MG tablet Take 1 tablet (100 mg total) by mouth 2 (two) times daily.    metoprolol succinate (TOPROL XL) 100 MG 24 hr tablet Take 1 tablet (100 mg total) by mouth 2 (two) times daily.    spironolactone (ALDACTONE) 25 MG tablet TAKE 1 TABLET DAILY    nitroGLYCERIN (NITROSTAT) 0.4 MG SL tablet Place 1 tablet (0.4 mg total) under the tongue every 5 (five) minutes as needed.    valsartan (DIOVAN) 320 MG tablet Take 1 tablet (320 mg total) by mouth once daily.           Hyperlipidemia Medications               fenofibrate (TRICOR) 145 MG tablet Take 145 mg by mouth once daily.    rosuvastatin (CRESTOR) 20 MG tablet Take 20 mg by mouth once daily.           Medication List with Changes/Refills   Current Medications    AMLODIPINE (NORVASC) 5 MG TABLET    TAKE 1 TABLET DAILY    AMOXICILLIN (AMOXIL) 500 MG TAB    TAKE FOUR TS PO 1 HOUR B DAPP    ASPIRIN (ECOTRIN) 81 MG EC TABLET    Take 81 mg by mouth once daily.     EMPAGLIFLOZIN (JARDIANCE) 25 MG TABLET     "Take 1 tablet (25 mg total) by mouth once daily.    FENOFIBRATE (TRICOR) 145 MG TABLET    Take 145 mg by mouth once daily.    GLIMEPIRIDE (AMARYL) 4 MG TABLET    Take 1 tablet (4 mg total) by mouth 2 (two) times daily with meals.    HYDRALAZINE (APRESOLINE) 100 MG TABLET    Take 1 tablet (100 mg total) by mouth 2 (two) times daily.    INSULIN GLARGINE U-300 CONC (TOUJEO MAX U-300 SOLOSTAR) 300 UNIT/ML (3 ML) INSULIN PEN    Inject 72 Units into the skin once daily.    METFORMIN (GLUCOPHAGE) 1000 MG TABLET    TAKE 1 TABLET TWICE A DAY WITH MEALS    METOPROLOL SUCCINATE (TOPROL XL) 100 MG 24 HR TABLET    Take 1 tablet (100 mg total) by mouth 2 (two) times daily.    NITROGLYCERIN (NITROSTAT) 0.4 MG SL TABLET    Place 1 tablet (0.4 mg total) under the tongue every 5 (five) minutes as needed.    PANTOPRAZOLE (PROTONIX) 40 MG TABLET    Take 40 mg by mouth 2 (two) times daily.    PEN NEEDLE, DIABETIC (NOVOFINE 32) 32 GAUGE X 1/4" NDLE    USE 1 NEEDLE DAILY WITH INSULIN. DISPENSE GENERIC.    POTASSIUM CHLORIDE SA (K-DUR,KLOR-CON) 20 MEQ TABLET    Take 1 tablet (20 mEq total) by mouth once daily. with food.    ROSUVASTATIN (CRESTOR) 20 MG TABLET    Take 20 mg by mouth once daily.    SPIRONOLACTONE (ALDACTONE) 25 MG TABLET    TAKE 1 TABLET DAILY    TIRZEPATIDE (MOUNJARO) 5 MG/0.5 ML PNIJ    Inject 5 mg into the skin every 7 days.    VALSARTAN (DIOVAN) 320 MG TABLET    Take 1 tablet (320 mg total) by mouth once daily.       I personally reviewed past medical, family and social history.    Patient Active Problem List   Diagnosis    Tachycardia    Essential hypertension    Venous insufficiency    Morbid obesity with BMI of 40.0-44.9, adult    Type 2 diabetes mellitus without complication, with long-term current use of insulin    SOB (shortness of breath)    Hypercholesterolemia    Coronary artery disease involving native coronary artery of native heart with other form of angina pectoris    Long term (current) use of " antithrombotics/antiplatelets    Stented coronary artery    History of deep vein thrombosis (DVT) of lower extremity    Obstructive sleep apnea    Gastroesophageal reflux disease without esophagitis    Thoracic aorta atherosclerosis

## 2023-05-05 PROBLEM — I50.22 CHRONIC SYSTOLIC CONGESTIVE HEART FAILURE: Status: ACTIVE | Noted: 2023-05-05

## 2023-05-08 ENCOUNTER — TELEPHONE (OUTPATIENT)
Dept: ADMINISTRATIVE | Facility: CLINIC | Age: 63
End: 2023-05-08
Payer: MEDICARE

## 2023-05-08 ENCOUNTER — PATIENT MESSAGE (OUTPATIENT)
Dept: ADMINISTRATIVE | Facility: CLINIC | Age: 63
End: 2023-05-08
Payer: MEDICARE

## 2023-05-10 ENCOUNTER — OFFICE VISIT (OUTPATIENT)
Dept: HOME HEALTH SERVICES | Facility: CLINIC | Age: 63
End: 2023-05-10
Payer: MEDICARE

## 2023-05-10 ENCOUNTER — TELEPHONE (OUTPATIENT)
Dept: ADMINISTRATIVE | Facility: CLINIC | Age: 63
End: 2023-05-10
Payer: MEDICARE

## 2023-05-10 VITALS — BODY MASS INDEX: 40.73 KG/M2 | HEIGHT: 69 IN | WEIGHT: 275 LBS

## 2023-05-10 DIAGNOSIS — Z00.00 ENCOUNTER FOR MEDICARE ANNUAL WELLNESS EXAM: Primary | ICD-10-CM

## 2023-05-10 DIAGNOSIS — G47.33 OBSTRUCTIVE SLEEP APNEA: ICD-10-CM

## 2023-05-10 DIAGNOSIS — E11.65 TYPE 2 DIABETES MELLITUS WITH HYPERGLYCEMIA, WITH LONG-TERM CURRENT USE OF INSULIN: ICD-10-CM

## 2023-05-10 DIAGNOSIS — I70.0 THORACIC AORTA ATHEROSCLEROSIS: ICD-10-CM

## 2023-05-10 DIAGNOSIS — Z00.00 ENCOUNTER FOR PREVENTIVE HEALTH EXAMINATION: ICD-10-CM

## 2023-05-10 DIAGNOSIS — I50.22 CHRONIC SYSTOLIC CONGESTIVE HEART FAILURE: ICD-10-CM

## 2023-05-10 DIAGNOSIS — Z79.4 TYPE 2 DIABETES MELLITUS WITH HYPERGLYCEMIA, WITH LONG-TERM CURRENT USE OF INSULIN: ICD-10-CM

## 2023-05-10 DIAGNOSIS — E78.00 HYPERCHOLESTEROLEMIA: ICD-10-CM

## 2023-05-10 DIAGNOSIS — I10 ESSENTIAL HYPERTENSION: ICD-10-CM

## 2023-05-10 DIAGNOSIS — K21.9 GASTROESOPHAGEAL REFLUX DISEASE WITHOUT ESOPHAGITIS: ICD-10-CM

## 2023-05-10 DIAGNOSIS — E66.01 MORBID OBESITY WITH BMI OF 40.0-44.9, ADULT: ICD-10-CM

## 2023-05-10 DIAGNOSIS — I25.118 CORONARY ARTERY DISEASE INVOLVING NATIVE CORONARY ARTERY OF NATIVE HEART WITH OTHER FORM OF ANGINA PECTORIS: ICD-10-CM

## 2023-05-10 PROCEDURE — 1159F PR MEDICATION LIST DOCUMENTED IN MEDICAL RECORD: ICD-10-PCS | Mod: CPTII,95,, | Performed by: NURSE PRACTITIONER

## 2023-05-10 PROCEDURE — 3008F PR BODY MASS INDEX (BMI) DOCUMENTED: ICD-10-PCS | Mod: CPTII,95,, | Performed by: NURSE PRACTITIONER

## 2023-05-10 PROCEDURE — 1159F MED LIST DOCD IN RCRD: CPT | Mod: CPTII,95,, | Performed by: NURSE PRACTITIONER

## 2023-05-10 PROCEDURE — 3051F PR MOST RECENT HEMOGLOBIN A1C LEVEL 7.0 - < 8.0%: ICD-10-PCS | Mod: CPTII,95,, | Performed by: NURSE PRACTITIONER

## 2023-05-10 PROCEDURE — 3051F HG A1C>EQUAL 7.0%<8.0%: CPT | Mod: CPTII,95,, | Performed by: NURSE PRACTITIONER

## 2023-05-10 PROCEDURE — 1160F PR REVIEW ALL MEDS BY PRESCRIBER/CLIN PHARMACIST DOCUMENTED: ICD-10-PCS | Mod: CPTII,95,, | Performed by: NURSE PRACTITIONER

## 2023-05-10 PROCEDURE — G0439 PR MEDICARE ANNUAL WELLNESS SUBSEQUENT VISIT: ICD-10-PCS | Mod: 95,,, | Performed by: NURSE PRACTITIONER

## 2023-05-10 PROCEDURE — 4010F PR ACE/ARB THEARPY RXD/TAKEN: ICD-10-PCS | Mod: CPTII,95,, | Performed by: NURSE PRACTITIONER

## 2023-05-10 PROCEDURE — 3008F BODY MASS INDEX DOCD: CPT | Mod: CPTII,95,, | Performed by: NURSE PRACTITIONER

## 2023-05-10 PROCEDURE — G0439 PPPS, SUBSEQ VISIT: HCPCS | Mod: 95,,, | Performed by: NURSE PRACTITIONER

## 2023-05-10 PROCEDURE — 1160F RVW MEDS BY RX/DR IN RCRD: CPT | Mod: CPTII,95,, | Performed by: NURSE PRACTITIONER

## 2023-05-10 PROCEDURE — 3072F PR LOW RISK FOR RETINOPATHY: ICD-10-PCS | Mod: CPTII,95,, | Performed by: NURSE PRACTITIONER

## 2023-05-10 PROCEDURE — 4010F ACE/ARB THERAPY RXD/TAKEN: CPT | Mod: CPTII,95,, | Performed by: NURSE PRACTITIONER

## 2023-05-10 PROCEDURE — 3072F LOW RISK FOR RETINOPATHY: CPT | Mod: CPTII,95,, | Performed by: NURSE PRACTITIONER

## 2023-05-12 PROBLEM — E11.65 TYPE 2 DIABETES MELLITUS WITH HYPERGLYCEMIA, WITH LONG-TERM CURRENT USE OF INSULIN: Status: ACTIVE | Noted: 2017-08-09

## 2023-05-12 NOTE — PROGRESS NOTES
"The patient location is: [place]  The chief complaint leading to consultation is: Health Risk Assessment    Visit type: audiovisual    Face to Face time with patient: 25  40 minutes of total time spent on the encounter, which includes face to face time and non-face to face time preparing to see the patient (eg, review of tests), Obtaining and/or reviewing separately obtained history, Documenting clinical information in the electronic or other health record, Independently interpreting results (not separately reported) and communicating results to the patient/family/caregiver, or Care coordination (not separately reported).         Each patient to whom he or she provides medical services by telemedicine is:  (1) informed of the relationship between the physician and patient and the respective role of any other health care provider with respect to management of the patient; and (2) notified that he or she may decline to receive medical services by telemedicine and may withdraw from such care at any time.    Notes:       Rashel Munosn presented for a  Medicare AWV and comprehensive Health Risk Assessment today. The following components were reviewed and updated:    Medical history  Family History  Social history  Allergies and Current Medications  Health Risk Assessment  Health Maintenance  Care Team         ** See Completed Assessments for Annual Wellness Visit within the encounter summary.**         The following assessments were completed:  Living Situation  CAGE  Depression Screening  Fall Risk Assessment (MACH 10)  Hearing Assessment(HHI)  Cognitive Function Screening  Nutrition Screening  ADL Screening  PAQ Screening      Vitals:    05/10/23 1200   Weight: 124.7 kg (275 lb)   Height: 5' 9" (1.753 m)     Body mass index is 40.61 kg/m².  Physical Exam  Constitutional:       Appearance: He is obese.   Neurological:      General: No focal deficit present.      Mental Status: He is alert and oriented to person, " place, and time.             Diagnoses and health risks identified today and associated recommendations/orders:    1. Encounter for Medicare annual wellness exam  - Ambulatory Referral/Consult to Enhanced Annual Wellness Visit (eAWV)    2. Encounter for preventive health examination  Assessments completed. Preventive measures and health maintenance reviewed with patient.  Review for opioid screening: Patient does not have any prescriptions for opioids.  Review for substance use disorder: Patient does not use any substances.    3. Morbid obesity with BMI of 40.0-44.9, adult  Stable, followed by PCP and Bariatrics.    4. Type 2 diabetes mellitus with hyperglycemia, with long-term current use of insulin  Stable, patient on Jardiance, Glimepiride, Toujeo, Metformin, and Mounjaro. Followed by PCP and Endocrinology.    5. Coronary artery disease involving native coronary artery of native heart with other form of angina pectoris  Stable, patient on Nitro prn. Followed by Cardiology.    6. Thoracic aorta atherosclerosis  Stable, patient on Aspirin. Followed by Cardiology.    7. Chronic systolic congestive heart failure  Stable, patient on Aldactone. Followed by Cardiology.    8. Essential hypertension  Stable, patient on Amlodipine, Hydralazine, Bystolic, Valsartan, and Aldactone. Followed by PCP and Cardiology.    9. Hypercholesterolemia  Stable, patient on Tricor and Crestor. Followed by PCP.    10. Gastroesophageal reflux disease without esophagitis  Stable, patient on Protonix. Followed by PCP.    11. Obstructive sleep apnea  Stable, followed by PCP.      Provided Rashel with a 5-10 year written screening schedule and personal prevention plan. Recommendations were developed using the USPSTF age appropriate recommendations. Education, counseling, and referrals were provided as needed. After Visit Summary printed and given to patient which includes a list of additional screenings\tests needed.    Follow up in about 1 year  (around 5/10/2024) for your next annual wellness visit.    Shelley Gray, NP  I offered to discuss advanced care planning, including how to pick a person who would make decisions for you if you were unable to make them for yourself, called a health care power of , and what kind of decisions you might make such as use of life sustaining treatments such as ventilators and tube feeding when faced with a life limiting illness recorded on a living will that they will need to know. (How you want to be cared for as you near the end of your natural life)     X Patient is interested in learning more about how to make advanced directives.  I provided them paperwork and offered to discuss this with them.

## 2023-05-12 NOTE — PATIENT INSTRUCTIONS
Counseling and Referral of Other Preventative  (Italic type indicates deductible and co-insurance are waived)    Patient Name: Rashel Munson  Today's Date: 5/12/2023    Health Maintenance       Date Due Completion Date    HIV Screening Never done ---    Shingles Vaccine (1 of 2) Never done ---    Pneumococcal Vaccines (Age 0-64) (2 - PCV) 08/19/2021 8/19/2020    COVID-19 Vaccine (4 - Booster for Moderna series) 02/17/2022 12/23/2021    Foot Exam 12/09/2022 12/9/2021    Eye Exam 05/11/2023 5/11/2022    Hemoglobin A1c 09/16/2023 3/16/2023    Diabetes Urine Screening 12/15/2023 12/15/2022    Lipid Panel 02/07/2024 2/7/2023    High Dose Statin 05/10/2024 5/10/2023    TETANUS VACCINE 08/19/2030 8/19/2020    Colorectal Cancer Screening 07/07/2031 7/7/2021        No orders of the defined types were placed in this encounter.      The following information is provided to all patients.  This information is to help you find resources for any of the problems found today that may be affecting your health:                Living healthy guide: www.[facility].[place].gov      Understanding Diabetes: www.diabetes.org      Eating healthy: www.cdc.gov/healthyweight      CDC home safety checklist: www.cdc.gov/steadi/patient.html      Agency on Aging: www.goea.[place].[facility]      Alcoholics anonymous (AA): www.aa.org      Physical Activity: www.benjamin.nih.gov/zg3gufq      Tobacco use: www.quitwithusla.org

## 2023-06-27 ENCOUNTER — PATIENT MESSAGE (OUTPATIENT)
Dept: ENDOCRINOLOGY | Facility: CLINIC | Age: 63
End: 2023-06-27
Payer: MEDICARE

## 2023-06-27 DIAGNOSIS — E11.8 TYPE 2 DIABETES MELLITUS WITH COMPLICATION, WITHOUT LONG-TERM CURRENT USE OF INSULIN: Primary | ICD-10-CM

## 2023-06-28 ENCOUNTER — TELEPHONE (OUTPATIENT)
Dept: PHARMACY | Facility: CLINIC | Age: 63
End: 2023-06-28
Payer: MEDICARE

## 2023-06-28 NOTE — TELEPHONE ENCOUNTER
----- Message from Jacky Doran sent at 6/27/2023  3:01 PM CDT -----  Regarding: FW: Order for RASHEL FARRELL III,     Thank you for submitting a referral to the Pharmacy Patient Assistance Team. We have received your referral for Rashel Farrell III. This patient case has been assigned to TANYA RESENDIZ, who will review the case and contact the patient with assistance options. You may review progress in the patient's chart through Telephone Encounter notes from Pharmacy Patient Assistance.       Thank you,     Pharmacy Patient Assistance Team    ----- Message -----  From: Shabnam Buitrago PharmD  Sent: 6/27/2023  12:43 PM CDT  To: Pharmacy Patient Assistance Team  Subject: Order for RASHEL FARRELL III

## 2023-06-28 NOTE — TELEPHONE ENCOUNTER
I have reached out to Rashel Munson III to inform him of the [employer] application process for Toujeo and whats required to apply.  Rashel Munson III did not answer. I left a voicemail and mailed a letter introducing him to the pharmacy patient assistance program. I will follow up in 5 business days.

## 2023-07-03 ENCOUNTER — PATIENT MESSAGE (OUTPATIENT)
Dept: ENDOCRINOLOGY | Facility: CLINIC | Age: 63
End: 2023-07-03
Payer: MEDICARE

## 2023-07-03 DIAGNOSIS — E11.8 TYPE 2 DIABETES MELLITUS WITH COMPLICATION, WITHOUT LONG-TERM CURRENT USE OF INSULIN: ICD-10-CM

## 2023-07-05 RX ORDER — INSULIN GLARGINE 300 U/ML
42 INJECTION, SOLUTION SUBCUTANEOUS NIGHTLY
Qty: 18 ML | Refills: 3 | Status: SHIPPED | OUTPATIENT
Start: 2023-07-05 | End: 2023-07-31 | Stop reason: SDUPTHER

## 2023-07-27 ENCOUNTER — LAB VISIT (OUTPATIENT)
Dept: PRIMARY CARE CLINIC | Facility: CLINIC | Age: 63
End: 2023-07-27
Payer: MEDICARE

## 2023-07-27 DIAGNOSIS — E11.8 TYPE 2 DIABETES MELLITUS WITH COMPLICATION, WITHOUT LONG-TERM CURRENT USE OF INSULIN: ICD-10-CM

## 2023-07-27 LAB
CHOLEST SERPL-MCNC: 107 MG/DL (ref 120–199)
CHOLEST/HDLC SERPL: 4.7 {RATIO} (ref 2–5)
ESTIMATED AVG GLUCOSE: 140 MG/DL (ref 68–131)
HBA1C MFR BLD: 6.5 % (ref 4–5.6)
HDLC SERPL-MCNC: 23 MG/DL (ref 40–75)
HDLC SERPL: 21.5 % (ref 20–50)
LDLC SERPL CALC-MCNC: 23.4 MG/DL (ref 63–159)
NONHDLC SERPL-MCNC: 84 MG/DL
TRIGL SERPL-MCNC: 303 MG/DL (ref 30–150)

## 2023-07-27 PROCEDURE — 80061 LIPID PANEL: CPT | Performed by: NURSE PRACTITIONER

## 2023-07-27 PROCEDURE — 83036 HEMOGLOBIN GLYCOSYLATED A1C: CPT | Performed by: NURSE PRACTITIONER

## 2023-07-31 ENCOUNTER — TELEPHONE (OUTPATIENT)
Dept: ENDOCRINOLOGY | Facility: CLINIC | Age: 63
End: 2023-07-31

## 2023-07-31 ENCOUNTER — OFFICE VISIT (OUTPATIENT)
Dept: ENDOCRINOLOGY | Facility: CLINIC | Age: 63
End: 2023-07-31
Payer: MEDICARE

## 2023-07-31 VITALS
WEIGHT: 284.81 LBS | SYSTOLIC BLOOD PRESSURE: 138 MMHG | DIASTOLIC BLOOD PRESSURE: 68 MMHG | HEART RATE: 80 BPM | BODY MASS INDEX: 42.18 KG/M2 | HEIGHT: 69 IN

## 2023-07-31 DIAGNOSIS — E66.01 MORBID OBESITY WITH BMI OF 40.0-44.9, ADULT: ICD-10-CM

## 2023-07-31 DIAGNOSIS — E78.00 HYPERCHOLESTEROLEMIA: ICD-10-CM

## 2023-07-31 DIAGNOSIS — I10 ESSENTIAL HYPERTENSION: ICD-10-CM

## 2023-07-31 DIAGNOSIS — E11.8 TYPE 2 DIABETES MELLITUS WITH COMPLICATION, WITHOUT LONG-TERM CURRENT USE OF INSULIN: Primary | ICD-10-CM

## 2023-07-31 DIAGNOSIS — I25.10 CORONARY ARTERY DISEASE INVOLVING NATIVE CORONARY ARTERY OF NATIVE HEART WITHOUT ANGINA PECTORIS: ICD-10-CM

## 2023-07-31 PROCEDURE — 3075F SYST BP GE 130 - 139MM HG: CPT | Mod: CPTII,S$GLB,, | Performed by: NURSE PRACTITIONER

## 2023-07-31 PROCEDURE — 3078F PR MOST RECENT DIASTOLIC BLOOD PRESSURE < 80 MM HG: ICD-10-PCS | Mod: CPTII,S$GLB,, | Performed by: NURSE PRACTITIONER

## 2023-07-31 PROCEDURE — 1160F RVW MEDS BY RX/DR IN RCRD: CPT | Mod: CPTII,S$GLB,, | Performed by: NURSE PRACTITIONER

## 2023-07-31 PROCEDURE — 99214 PR OFFICE/OUTPT VISIT, EST, LEVL IV, 30-39 MIN: ICD-10-PCS | Mod: S$GLB,,, | Performed by: NURSE PRACTITIONER

## 2023-07-31 PROCEDURE — 1160F PR REVIEW ALL MEDS BY PRESCRIBER/CLIN PHARMACIST DOCUMENTED: ICD-10-PCS | Mod: CPTII,S$GLB,, | Performed by: NURSE PRACTITIONER

## 2023-07-31 PROCEDURE — 3078F DIAST BP <80 MM HG: CPT | Mod: CPTII,S$GLB,, | Performed by: NURSE PRACTITIONER

## 2023-07-31 PROCEDURE — 4010F ACE/ARB THERAPY RXD/TAKEN: CPT | Mod: CPTII,S$GLB,, | Performed by: NURSE PRACTITIONER

## 2023-07-31 PROCEDURE — 3008F PR BODY MASS INDEX (BMI) DOCUMENTED: ICD-10-PCS | Mod: CPTII,S$GLB,, | Performed by: NURSE PRACTITIONER

## 2023-07-31 PROCEDURE — 3044F HG A1C LEVEL LT 7.0%: CPT | Mod: CPTII,S$GLB,, | Performed by: NURSE PRACTITIONER

## 2023-07-31 PROCEDURE — 3008F BODY MASS INDEX DOCD: CPT | Mod: CPTII,S$GLB,, | Performed by: NURSE PRACTITIONER

## 2023-07-31 PROCEDURE — 3044F PR MOST RECENT HEMOGLOBIN A1C LEVEL <7.0%: ICD-10-PCS | Mod: CPTII,S$GLB,, | Performed by: NURSE PRACTITIONER

## 2023-07-31 PROCEDURE — 3072F LOW RISK FOR RETINOPATHY: CPT | Mod: CPTII,S$GLB,, | Performed by: NURSE PRACTITIONER

## 2023-07-31 PROCEDURE — 1159F MED LIST DOCD IN RCRD: CPT | Mod: CPTII,S$GLB,, | Performed by: NURSE PRACTITIONER

## 2023-07-31 PROCEDURE — 99999 PR PBB SHADOW E&M-EST. PATIENT-LVL IV: ICD-10-PCS | Mod: PBBFAC,,, | Performed by: NURSE PRACTITIONER

## 2023-07-31 PROCEDURE — 3072F PR LOW RISK FOR RETINOPATHY: ICD-10-PCS | Mod: CPTII,S$GLB,, | Performed by: NURSE PRACTITIONER

## 2023-07-31 PROCEDURE — 99999 PR PBB SHADOW E&M-EST. PATIENT-LVL IV: CPT | Mod: PBBFAC,,, | Performed by: NURSE PRACTITIONER

## 2023-07-31 PROCEDURE — 4010F PR ACE/ARB THEARPY RXD/TAKEN: ICD-10-PCS | Mod: CPTII,S$GLB,, | Performed by: NURSE PRACTITIONER

## 2023-07-31 PROCEDURE — 1159F PR MEDICATION LIST DOCUMENTED IN MEDICAL RECORD: ICD-10-PCS | Mod: CPTII,S$GLB,, | Performed by: NURSE PRACTITIONER

## 2023-07-31 PROCEDURE — 3075F PR MOST RECENT SYSTOLIC BLOOD PRESS GE 130-139MM HG: ICD-10-PCS | Mod: CPTII,S$GLB,, | Performed by: NURSE PRACTITIONER

## 2023-07-31 PROCEDURE — 99214 OFFICE O/P EST MOD 30 MIN: CPT | Mod: S$GLB,,, | Performed by: NURSE PRACTITIONER

## 2023-07-31 RX ORDER — SEMAGLUTIDE 0.68 MG/ML
INJECTION, SOLUTION SUBCUTANEOUS
Qty: 3 ML | Refills: 6
Start: 2023-07-31 | End: 2023-10-24

## 2023-07-31 RX ORDER — INSULIN GLARGINE 300 U/ML
52 INJECTION, SOLUTION SUBCUTANEOUS NIGHTLY
Qty: 18 ML | Refills: 3
Start: 2023-07-31 | End: 2023-10-11 | Stop reason: SDUPTHER

## 2023-07-31 NOTE — PROGRESS NOTES
CC: Mr. Rashel Munson III arrives today for management of Type 2 DM and review of chronic medical conditions, as listed in the Visit Diagnosis section of this encounter.       HPI: Mr. Rashel Munson III was diagnosed with Type 2 DM in ~2010. He was diagnosed based on lab work. Initial treatment consisted of metformin. + FH of DM in maternal GM and uncles. Denies hospitalizations due to DM.   He has CAD and recently with 5 stent placements. Follows with Dr. Ogden.     Patient was last seen by me in March. At that time, Mounjaro was added. He had to stop using this in July due to cost.     He participates in the digital diabetes program.     He states that he's gained weight since starting Bystolic, Ranexa.     BG readings are checked 1x/day. These range 116-220s, including postprandial readings .     Hypoglycemia: Denies  Symptoms: lightheaded   Treatment: juice    Missing Insulin/PO medication doses: Rare.    Exercise: No formal     Dietary Habits: Eats 3 meals/day. Trying to avoid white carbs. Has reduced carb portions. Occasional snack. Has reduced intake of milk.    Last DM education appointment: 9/2020        CURRENT DIABETIC MEDS: metformin 1000 mg BID, glimepiride 4 mg BID, Jardiance 25 mg daily, Toujeo Max 48 units QHS  Vial or pen: n/a  Glucometer type: Darlyn    Previous DM treatments:  Glyburide   Ozempic - expensive  Mounjaro - expensive   Tresiba     Last Eye Exam: 5/2022, no DR   Last Podiatry Exam: n/a    REVIEW OF SYSTEMS  Constitutional: no c/o fatigue, weakness, weight loss. + weight gain   Cardiac: no palpitations or chest pain.  Respiratory: no cough, dyspnea   GI: no c/o abdominal pain or nausea. Denies h/o pancreatitis.    Skin: no lesions or rashes.  Neuro: no c/o numbness, tingling, paresthesias.   Endocrine: denies polyphagia, polydipsia, polyuria. Denies personal or family h/o MTC, MEN2.      Personally reviewed Past Medical, Surgical, Social History.    Vital Signs  /68 (BP  "Location: Right arm, Patient Position: Sitting, BP Method: X-Large (Manual))   Pulse 80   Ht 5' 9" (1.753 m)   Wt 129.2 kg (284 lb 13.4 oz)   BMI 42.06 kg/m²     Personally reviewed the below labs:    Hemoglobin A1C   Date Value Ref Range Status   07/27/2023 6.5 (H) 4.0 - 5.6 % Final     Comment:     ADA Screening Guidelines:  5.7-6.4%  Consistent with prediabetes  >or=6.5%  Consistent with diabetes    High levels of fetal hemoglobin interfere with the HbA1C  assay. Heterozygous hemoglobin variants (HbS, HgC, etc)do  not significantly interfere with this assay.   However, presence of multiple variants may affect accuracy.     03/16/2023 7.3 (H) 4.0 - 5.6 % Final     Comment:     ADA Screening Guidelines:  5.7-6.4%  Consistent with prediabetes  >or=6.5%  Consistent with diabetes    High levels of fetal hemoglobin interfere with the HbA1C  assay. Heterozygous hemoglobin variants (HbS, HgC, etc)do  not significantly interfere with this assay.   However, presence of multiple variants may affect accuracy.     12/15/2022 5.9 (H) 4.0 - 5.6 % Final     Comment:     ADA Screening Guidelines:  5.7-6.4%  Consistent with prediabetes  >or=6.5%  Consistent with diabetes    High levels of fetal hemoglobin interfere with the HbA1C  assay. Heterozygous hemoglobin variants (HbS, HgC, etc)do  not significantly interfere with this assay.   However, presence of multiple variants may affect accuracy.         Chemistry        Component Value Date/Time     06/01/2023 1124    K 4.4 06/01/2023 1124     06/01/2023 1124    CO2 25 06/01/2023 1124    BUN 16 06/01/2023 1124    CREATININE 0.88 06/01/2023 1124     (H) 06/01/2023 1124        Component Value Date/Time    CALCIUM 8.6 06/01/2023 1124    ALKPHOS 41 05/05/2023 1634    AST 76 (H) 05/05/2023 1634    ALT 40 05/05/2023 1634    BILITOT 1.3 05/05/2023 1634    ESTGFRAFRICA >60.0 06/16/2022 0923    EGFRNONAA >60.0 06/16/2022 0923          Lab Results   Component Value Date "    CHOL 107 (L) 07/27/2023    CHOL 98 (L) 06/16/2022    CHOL 99 (L) 03/03/2022     Lab Results   Component Value Date    HDL 23 (L) 07/27/2023    HDL 26 (L) 06/16/2022    HDL 24 (L) 03/03/2022     Lab Results   Component Value Date    LDLCALC 23.4 (L) 07/27/2023    LDLCALC 10.0 (L) 06/16/2022    LDLCALC Invalid, Trig>400.0 03/03/2022     Lab Results   Component Value Date    TRIG 303 (H) 07/27/2023    TRIG 310 (H) 06/16/2022    TRIG 417 (H) 03/03/2022     Lab Results   Component Value Date    CHOLHDL 21.5 07/27/2023    CHOLHDL 26.5 06/16/2022    CHOLHDL 24.2 03/03/2022       Lab Results   Component Value Date    MICALBCREAT 6.0 12/15/2022     Lab Results   Component Value Date    TSH 1.620 09/15/2022       CrCl cannot be calculated (Patient's most recent lab result is older than the maximum 7 days allowed.).    Vit D, 25-Hydroxy   Date Value Ref Range Status   09/15/2022 28 (L) 30 - 96 ng/mL Final     Comment:     Vitamin D deficiency.........<10 ng/mL                              Vitamin D insufficiency......10-29 ng/mL       Vitamin D sufficiency........> or equal to 30 ng/mL  Vitamin D toxicity............>100 ng/mL           PHYSICAL EXAMINATION  Constitutional: Appears well, no distress  Respiratory: CTA, even and unlabored.  Cardiovascular: RRR, no murmurs, no carotid bruits. Trace edema to BLE.    GI: active bowel sounds, no hernia noted.  Skin: warm and dry; no visible wounds  Neuro: oriented to person, place, time  Feet: appropriate footwear.  Protective Sensation (w/ 10 gram monofilament):  Right: Intact  Left: Intact    Visual Inspection:  Normal -  Bilateral    Pedal Pulses:   Right: Present  Left: Present    Posterior Tibialis Pulses:   Right:Present  Left: Present       Goals         80 <= Glucose <= 180 (pt-stated)       Blood Pressure < 130/80 (pt-stated)       Exercise at least 150 minutes per week.       HEMOGLOBIN A1C < 7       Take at least one BP reading per week at various times of the day                  Assessment/Plan  1. Type 2 diabetes mellitus with complication, without long-term current use of insulin  -- A1c at goal but glucoses increasing since stopping Mounjaro. Only weekly GLP-1RA that has Patient Assistance program is Ozempic. Trying to avoid prandial insulin, as this may worsen weight gain.   -- will contact [facility] Patient Assistance for Ozempic. Start Ozempic 0.25 mg weekly. After 4 weeks, increase dose to 0.5 mg weekly. Discussed medication's mechanism of action, side effects, and contraindications. Advised pt to notify me for extreme nausea, abdominal pain, etc.  -- increase Toujeo Max to 52 units QHS  -- continue Jardiance, glimepiride, metformin  -- check BG 2x/day  -- schedule eye exam   -- takes ARB,statin, aspirin   2. Coronary artery disease involving native coronary artery of native heart without angina pectoris  -- optimize DM control.  -- follows with cardiology   3. Hypertension  -- acceptable  -- continue current meds. Managed by cardiology   4. Hypercholesterolemia  -- uncontrolled with elevated TRG  -- taking Crestor, fenofibrate   -- follows with cardiology   5. Morbid obesity with BMI of 40.0-44.9, adult  -- uncontrolled  -- increasing insulin resistance.  Body mass index is 42.06 kg/m².        FOLLOW UP  Follow up in about 4 months (around 11/30/2023).  Virtual visit   Patient instructed to bring BG logs to each follow up   Patient encouraged to call for any BG/medication issues, concerns, or questions.    Orders Placed This Encounter   Procedures    Hemoglobin A1C    Comprehensive Metabolic Panel    Microalbumin/Creatinine Ratio, Urine

## 2023-08-09 ENCOUNTER — TELEPHONE (OUTPATIENT)
Dept: PHARMACY | Facility: CLINIC | Age: 63
End: 2023-08-09
Payer: MEDICARE

## 2023-08-09 ENCOUNTER — PATIENT MESSAGE (OUTPATIENT)
Dept: PHARMACY | Facility: CLINIC | Age: 63
End: 2023-08-09
Payer: MEDICARE

## 2023-08-09 NOTE — LETTER
August 9, 2023    Rashel Munson III  [address] [facility] [address]             [facility]  [address]  [facility] [address]  Phone: [phone]  Fax: [phone]  [email or web address]   Dear Mr. Munson,    My Name is Ninfaruben Cottrell. I am a Pharmacy Technician reaching out on behalf of [facility]s Pharmacy Patient Assistance Team after receiving a referral from your provider inquiring about assistance with your medications. I tried to call you on 8/9/2023 but was unable to reach you. Our goal is to assist qualified patients with financial assistance for their medications to better help you achieve your health goals!    Please note that enrollment and eligibility into available support may require the following documents:    Proof of household Income( such as social security statement, 1099 form, pension statement or 3 consecutive pay stubs  Copy of all insurance cards (front and back)   Print out from your insurance or pharmacy showing how much you have spent on prescriptions this year  Signed HIPAA and Authorization forms (These forms will be sent to you once you contact us)     Please reach out to my phone number below if you are still in need of assistance with your medications. We will attempt to reach out to you through [employer] or via phone call again in 5 business days. We look forward to hearing from you soon!    Thank you for choosing [facility] for your healthcare needs    Sincerely  Ninfa Cottrell

## 2023-08-09 NOTE — TELEPHONE ENCOUNTER
I have reached out to Rashel Munson III to inform him of the [place] [employer] application process for Ozempic and whats required to apply.  Rashel Munson III did not answer. I left a voicemail and sent a portal letter introducing him to the pharmacy patient assistance program. I will follow up in 5 business days.

## 2023-08-22 DIAGNOSIS — E11.8 TYPE 2 DIABETES MELLITUS WITH COMPLICATION, WITHOUT LONG-TERM CURRENT USE OF INSULIN: ICD-10-CM

## 2023-08-22 RX ORDER — GLIMEPIRIDE 4 MG/1
4 TABLET ORAL 2 TIMES DAILY WITH MEALS
Qty: 180 TABLET | Refills: 3 | Status: SHIPPED | OUTPATIENT
Start: 2023-08-22

## 2023-09-07 ENCOUNTER — LAB VISIT (OUTPATIENT)
Dept: PRIMARY CARE CLINIC | Facility: CLINIC | Age: 63
End: 2023-09-07
Payer: MEDICARE

## 2023-09-07 DIAGNOSIS — Z12.5 SCREENING FOR PROSTATE CANCER: ICD-10-CM

## 2023-09-07 LAB — COMPLEXED PSA SERPL-MCNC: 0.32 NG/ML (ref 0–4)

## 2023-09-07 PROCEDURE — 36415 COLL VENOUS BLD VENIPUNCTURE: CPT | Mod: S$GLB,,, | Performed by: INTERNAL MEDICINE

## 2023-09-07 PROCEDURE — 84153 ASSAY OF PSA TOTAL: CPT | Performed by: INTERNAL MEDICINE

## 2023-09-07 PROCEDURE — 36415 PR COLLECTION VENOUS BLOOD,VENIPUNCTURE: ICD-10-PCS | Mod: S$GLB,,, | Performed by: INTERNAL MEDICINE

## 2023-10-02 ENCOUNTER — OFFICE VISIT (OUTPATIENT)
Dept: FAMILY MEDICINE | Facility: CLINIC | Age: 63
End: 2023-10-02
Payer: MEDICARE

## 2023-10-02 VITALS
HEIGHT: 69 IN | HEART RATE: 94 BPM | SYSTOLIC BLOOD PRESSURE: 135 MMHG | OXYGEN SATURATION: 98 % | WEIGHT: 282.44 LBS | BODY MASS INDEX: 41.83 KG/M2 | DIASTOLIC BLOOD PRESSURE: 70 MMHG

## 2023-10-02 DIAGNOSIS — E66.01 MORBID OBESITY WITH BMI OF 40.0-44.9, ADULT: ICD-10-CM

## 2023-10-02 DIAGNOSIS — Z79.4 TYPE 2 DIABETES MELLITUS WITH HYPERGLYCEMIA, WITH LONG-TERM CURRENT USE OF INSULIN: ICD-10-CM

## 2023-10-02 DIAGNOSIS — K21.9 GASTROESOPHAGEAL REFLUX DISEASE WITHOUT ESOPHAGITIS: ICD-10-CM

## 2023-10-02 DIAGNOSIS — E78.00 HYPERCHOLESTEROLEMIA: ICD-10-CM

## 2023-10-02 DIAGNOSIS — G47.33 OBSTRUCTIVE SLEEP APNEA: ICD-10-CM

## 2023-10-02 DIAGNOSIS — I25.118 CORONARY ARTERY DISEASE INVOLVING NATIVE CORONARY ARTERY OF NATIVE HEART WITH OTHER FORM OF ANGINA PECTORIS: ICD-10-CM

## 2023-10-02 DIAGNOSIS — I10 ESSENTIAL HYPERTENSION: ICD-10-CM

## 2023-10-02 DIAGNOSIS — Z00.00 ANNUAL PHYSICAL EXAM: Primary | ICD-10-CM

## 2023-10-02 DIAGNOSIS — E11.65 TYPE 2 DIABETES MELLITUS WITH HYPERGLYCEMIA, WITH LONG-TERM CURRENT USE OF INSULIN: ICD-10-CM

## 2023-10-02 PROCEDURE — 99999 PR PBB SHADOW E&M-EST. PATIENT-LVL V: ICD-10-PCS | Mod: PBBFAC,,, | Performed by: INTERNAL MEDICINE

## 2023-10-02 PROCEDURE — 3078F PR MOST RECENT DIASTOLIC BLOOD PRESSURE < 80 MM HG: ICD-10-PCS | Mod: CPTII,S$GLB,, | Performed by: INTERNAL MEDICINE

## 2023-10-02 PROCEDURE — 3044F HG A1C LEVEL LT 7.0%: CPT | Mod: CPTII,S$GLB,, | Performed by: INTERNAL MEDICINE

## 2023-10-02 PROCEDURE — 1159F PR MEDICATION LIST DOCUMENTED IN MEDICAL RECORD: ICD-10-PCS | Mod: CPTII,S$GLB,, | Performed by: INTERNAL MEDICINE

## 2023-10-02 PROCEDURE — 3008F BODY MASS INDEX DOCD: CPT | Mod: CPTII,S$GLB,, | Performed by: INTERNAL MEDICINE

## 2023-10-02 PROCEDURE — 3044F PR MOST RECENT HEMOGLOBIN A1C LEVEL <7.0%: ICD-10-PCS | Mod: CPTII,S$GLB,, | Performed by: INTERNAL MEDICINE

## 2023-10-02 PROCEDURE — 99214 PR OFFICE/OUTPT VISIT, EST, LEVL IV, 30-39 MIN: ICD-10-PCS | Mod: 25,S$GLB,, | Performed by: INTERNAL MEDICINE

## 2023-10-02 PROCEDURE — 3078F DIAST BP <80 MM HG: CPT | Mod: CPTII,S$GLB,, | Performed by: INTERNAL MEDICINE

## 2023-10-02 PROCEDURE — 90686 IIV4 VACC NO PRSV 0.5 ML IM: CPT | Mod: S$GLB,,, | Performed by: INTERNAL MEDICINE

## 2023-10-02 PROCEDURE — 4010F ACE/ARB THERAPY RXD/TAKEN: CPT | Mod: CPTII,S$GLB,, | Performed by: INTERNAL MEDICINE

## 2023-10-02 PROCEDURE — G0008 FLU VACCINE (QUAD) GREATER THAN OR EQUAL TO 3YO PRESERVATIVE FREE IM: ICD-10-PCS | Mod: S$GLB,,, | Performed by: INTERNAL MEDICINE

## 2023-10-02 PROCEDURE — 4010F PR ACE/ARB THEARPY RXD/TAKEN: ICD-10-PCS | Mod: CPTII,S$GLB,, | Performed by: INTERNAL MEDICINE

## 2023-10-02 PROCEDURE — 1159F MED LIST DOCD IN RCRD: CPT | Mod: CPTII,S$GLB,, | Performed by: INTERNAL MEDICINE

## 2023-10-02 PROCEDURE — 99999 PR PBB SHADOW E&M-EST. PATIENT-LVL V: CPT | Mod: PBBFAC,,, | Performed by: INTERNAL MEDICINE

## 2023-10-02 PROCEDURE — 3072F PR LOW RISK FOR RETINOPATHY: ICD-10-PCS | Mod: CPTII,S$GLB,, | Performed by: INTERNAL MEDICINE

## 2023-10-02 PROCEDURE — 1160F PR REVIEW ALL MEDS BY PRESCRIBER/CLIN PHARMACIST DOCUMENTED: ICD-10-PCS | Mod: CPTII,S$GLB,, | Performed by: INTERNAL MEDICINE

## 2023-10-02 PROCEDURE — 90686 FLU VACCINE (QUAD) GREATER THAN OR EQUAL TO 3YO PRESERVATIVE FREE IM: ICD-10-PCS | Mod: S$GLB,,, | Performed by: INTERNAL MEDICINE

## 2023-10-02 PROCEDURE — 1160F RVW MEDS BY RX/DR IN RCRD: CPT | Mod: CPTII,S$GLB,, | Performed by: INTERNAL MEDICINE

## 2023-10-02 PROCEDURE — 99214 OFFICE O/P EST MOD 30 MIN: CPT | Mod: 25,S$GLB,, | Performed by: INTERNAL MEDICINE

## 2023-10-02 PROCEDURE — 3008F PR BODY MASS INDEX (BMI) DOCUMENTED: ICD-10-PCS | Mod: CPTII,S$GLB,, | Performed by: INTERNAL MEDICINE

## 2023-10-02 PROCEDURE — 3075F PR MOST RECENT SYSTOLIC BLOOD PRESS GE 130-139MM HG: ICD-10-PCS | Mod: CPTII,S$GLB,, | Performed by: INTERNAL MEDICINE

## 2023-10-02 PROCEDURE — G0008 ADMIN INFLUENZA VIRUS VAC: HCPCS | Mod: S$GLB,,, | Performed by: INTERNAL MEDICINE

## 2023-10-02 PROCEDURE — 3072F LOW RISK FOR RETINOPATHY: CPT | Mod: CPTII,S$GLB,, | Performed by: INTERNAL MEDICINE

## 2023-10-02 PROCEDURE — 3075F SYST BP GE 130 - 139MM HG: CPT | Mod: CPTII,S$GLB,, | Performed by: INTERNAL MEDICINE

## 2023-10-02 RX ORDER — ROSUVASTATIN CALCIUM 5 MG/1
5 TABLET, COATED ORAL DAILY
COMMUNITY

## 2023-10-02 NOTE — PROGRESS NOTES
Patient ID: Rashel Munson III is a 63 y.o. male.    Chief Complaint: Follow-up        Assessment and Plan      1. Coronary artery disease involving native coronary artery of native heart with other form of angina pectoris    2. Essential hypertension    3. Hypercholesterolemia    4. Type 2 diabetes mellitus with hyperglycemia, with long-term current use of insulin    5. Gastroesophageal reflux disease without esophagitis    6. Obstructive sleep apnea    7. Morbid obesity with BMI of 40.0-44.9, adult    8. Annual physical exam     Continue current medications  Follow-up in 6 months  Disability form filled out     HPI     Annual     Med review- yes   Labs review- yes   Diet review- still working on portion control.   Exercise- walking   Alcohol- rarely   Tobacco- none   HM review- yes     He is following with Dr. Ogden for coronary artery disease and persistent dyspnea.  Thinks it may be elevated right heart pressures.  Symptoms are better on Ranexa and Bystolic.    His diabetes is controlled with last A1c of 6.5 was 7.3.  He is following with Endocrinology.  Trying to get Ozempic back via patient assistance.  He is taking metformin Jardiance, Toujeo 52 units, glimepiride 4 mg b.i.d..  States he will get an eye exam when his wife can drive him.    GERD stable on Protonix 40 mg b.i.d. sees Dr. Gardner    Obesity is unchanged.  Gastric sleeve is still not out of the question for him.  He would still like to try on his own and hopefully get the Ozempic back.    Triglycerides 303 on last lipid panel.  He is taking fenofibrate    He is back on CPAP for sleep apnea      Review of Systems   Constitutional:  Negative for fever.   Respiratory:  Positive for shortness of breath.    Cardiovascular:  Negative for chest pain.   Gastrointestinal:  Negative for abdominal pain.        Objective     Vitals:    10/02/23 1135   BP: 135/70   Pulse:      Wt Readings from Last 3 Encounters:   10/02/23 1105 128.1 kg (282 lb 6.6 oz)  "  09/01/23 1115 128.4 kg (283 lb)   07/31/23 1445 129.2 kg (284 lb 13.4 oz)      Body mass index is 41.7 kg/m².   Physical Exam  Cardiovascular:      Rate and Rhythm: Normal rate and regular rhythm.      Heart sounds: No murmur heard.     No gallop.   Pulmonary:      Breath sounds: Normal breath sounds. No wheezing or rhonchi.   Abdominal:      Palpations: Abdomen is soft.      Tenderness: There is no abdominal tenderness.          Medication List with Changes/Refills   Current Medications    AMLODIPINE (NORVASC) 10 MG TABLET    Take 1 tablet (10 mg total) by mouth once daily.    AMOXICILLIN (AMOXIL) 500 MG TAB    TAKE FOUR TS PO 1 HOUR B DAPP    ASPIRIN (ECOTRIN) 81 MG EC TABLET    Take 81 mg by mouth every evening.    FENOFIBRATE (TRICOR) 145 MG TABLET    Take 1 tablet (145 mg total) by mouth once daily.    GLIMEPIRIDE (AMARYL) 4 MG TABLET    TAKE 1 TABLET TWICE A DAY WITH MEALS    HYDRALAZINE (APRESOLINE) 100 MG TABLET    Take 1 tablet (100 mg total) by mouth 2 (two) times daily.    INSULIN GLARGINE U-300 CONC (TOUJEO MAX U-300 SOLOSTAR) 300 UNIT/ML (3 ML) INSULIN PEN    Inject 52 Units into the skin every evening.    JARDIANCE 25 MG TABLET    TAKE 1 TABLET DAILY    METFORMIN (GLUCOPHAGE) 1000 MG TABLET    TAKE 1 TABLET TWICE A DAY WITH MEALS    NEBIVOLOL (BYSTOLIC) 10 MG TAB    Take 1 tablet (10 mg total) by mouth once daily.    NITROGLYCERIN (NITROSTAT) 0.4 MG SL TABLET    Place 1 tablet (0.4 mg total) under the tongue every 5 (five) minutes as needed.    PANTOPRAZOLE (PROTONIX) 40 MG TABLET    Take 40 mg by mouth 2 (two) times daily.    PEN NEEDLE, DIABETIC (NOVOFINE 32) 32 GAUGE X 1/4" NDLE    USE 1 NEEDLE DAILY WITH INSULIN. DISPENSE GENERIC.    RANOLAZINE (RANEXA) 500 MG TB12    Take 1 tablet (500 mg total) by mouth 2 (two) times daily.    ROSUVASTATIN (CRESTOR) 5 MG TABLET    Take 5 mg by mouth once daily.    SEMAGLUTIDE (OZEMPIC) 0.25 MG OR 0.5 MG (2 MG/3 ML) PEN INJECTOR    Inject 0.25 mg into the skin " every 7 days for 4 weeks. Then increase to 0.5 mg every 7 days on week 5 and continue on this dose.    SPIRONOLACTONE (ALDACTONE) 25 MG TABLET    Take 1 tablet (25 mg total) by mouth once daily.    VALSARTAN (DIOVAN) 320 MG TABLET    Take 1 tablet (320 mg total) by mouth once daily.   Discontinued Medications    ROSUVASTATIN (CRESTOR) 10 MG TABLET    Take 1 tablet (10 mg total) by mouth every evening.       I personally reviewed past medical, family and social history.    Patient Active Problem List   Diagnosis    Tachycardia    Essential hypertension    Venous insufficiency    Morbid obesity with BMI of 40.0-44.9, adult    Type 2 diabetes mellitus with hyperglycemia, with long-term current use of insulin    SOB (shortness of breath)    Hypercholesterolemia    Coronary artery disease involving native coronary artery of native heart with other form of angina pectoris    Long term (current) use of antithrombotics/antiplatelets    Stented coronary artery    History of deep vein thrombosis (DVT) of lower extremity    Obstructive sleep apnea    Gastroesophageal reflux disease without esophagitis    Thoracic aorta atherosclerosis    Chronic systolic congestive heart failure

## 2023-10-24 ENCOUNTER — PATIENT MESSAGE (OUTPATIENT)
Dept: ADMINISTRATIVE | Facility: OTHER | Age: 63
End: 2023-10-24
Payer: MEDICARE

## 2023-11-15 ENCOUNTER — OFFICE VISIT (OUTPATIENT)
Dept: OPTOMETRY | Facility: CLINIC | Age: 63
End: 2023-11-15
Payer: MEDICARE

## 2023-11-15 DIAGNOSIS — E11.9 TYPE 2 DIABETES MELLITUS WITHOUT RETINOPATHY: Primary | ICD-10-CM

## 2023-11-15 DIAGNOSIS — H52.03 HYPEROPIA WITH PRESBYOPIA OF BOTH EYES: ICD-10-CM

## 2023-11-15 DIAGNOSIS — H25.13 NUCLEAR SCLEROSIS OF BOTH EYES: ICD-10-CM

## 2023-11-15 DIAGNOSIS — H52.4 HYPEROPIA WITH PRESBYOPIA OF BOTH EYES: ICD-10-CM

## 2023-11-15 PROCEDURE — 4010F ACE/ARB THERAPY RXD/TAKEN: CPT | Mod: CPTII,S$GLB,, | Performed by: OPTOMETRIST

## 2023-11-15 PROCEDURE — 99999 PR PBB SHADOW E&M-EST. PATIENT-LVL III: ICD-10-PCS | Mod: PBBFAC,,, | Performed by: OPTOMETRIST

## 2023-11-15 PROCEDURE — 2023F PR DILATED RETINAL EXAM W/O EVID OF RETINOPATHY: ICD-10-PCS | Mod: CPTII,S$GLB,, | Performed by: OPTOMETRIST

## 2023-11-15 PROCEDURE — 1159F MED LIST DOCD IN RCRD: CPT | Mod: CPTII,S$GLB,, | Performed by: OPTOMETRIST

## 2023-11-15 PROCEDURE — 92015 DETERMINE REFRACTIVE STATE: CPT | Mod: S$GLB,,, | Performed by: OPTOMETRIST

## 2023-11-15 PROCEDURE — 92014 COMPRE OPH EXAM EST PT 1/>: CPT | Mod: S$GLB,,, | Performed by: OPTOMETRIST

## 2023-11-15 PROCEDURE — 4010F PR ACE/ARB THEARPY RXD/TAKEN: ICD-10-PCS | Mod: CPTII,S$GLB,, | Performed by: OPTOMETRIST

## 2023-11-15 PROCEDURE — 1160F RVW MEDS BY RX/DR IN RCRD: CPT | Mod: CPTII,S$GLB,, | Performed by: OPTOMETRIST

## 2023-11-15 PROCEDURE — 3044F HG A1C LEVEL LT 7.0%: CPT | Mod: CPTII,S$GLB,, | Performed by: OPTOMETRIST

## 2023-11-15 PROCEDURE — 1160F PR REVIEW ALL MEDS BY PRESCRIBER/CLIN PHARMACIST DOCUMENTED: ICD-10-PCS | Mod: CPTII,S$GLB,, | Performed by: OPTOMETRIST

## 2023-11-15 PROCEDURE — 3044F PR MOST RECENT HEMOGLOBIN A1C LEVEL <7.0%: ICD-10-PCS | Mod: CPTII,S$GLB,, | Performed by: OPTOMETRIST

## 2023-11-15 PROCEDURE — 1159F PR MEDICATION LIST DOCUMENTED IN MEDICAL RECORD: ICD-10-PCS | Mod: CPTII,S$GLB,, | Performed by: OPTOMETRIST

## 2023-11-15 PROCEDURE — 99999 PR PBB SHADOW E&M-EST. PATIENT-LVL III: CPT | Mod: PBBFAC,,, | Performed by: OPTOMETRIST

## 2023-11-15 PROCEDURE — 92015 PR REFRACTION: ICD-10-PCS | Mod: S$GLB,,, | Performed by: OPTOMETRIST

## 2023-11-15 PROCEDURE — 2023F DILAT RTA XM W/O RTNOPTHY: CPT | Mod: CPTII,S$GLB,, | Performed by: OPTOMETRIST

## 2023-11-15 PROCEDURE — 92014 PR EYE EXAM, EST PATIENT,COMPREHESV: ICD-10-PCS | Mod: S$GLB,,, | Performed by: OPTOMETRIST

## 2023-11-15 NOTE — PROGRESS NOTES
HPI    Pt. Here for routine DM eye exam. DLS (05/11/2022)    Pt. States his vision is declining. With lights, things become very blurry   or when watching TV.    Pt. Struggles to drive at night.     Pt. Denies F/F.    Pt. Not using GTTS.    Pt. DM is under control w/ meds.    Hemoglobin A1C       Date                     Value               Ref Range             Status                07/27/2023               6.5 (H)             4.0 - 5.6 %           Final              Comment:    ADA Screening Guidelines:  5.7-6.4%  Consistent with   prediabetes  >or=6.5%  Consistent with diabetes    High levels of fetal   hemoglobin interfere with the HbA1C  assay. Heterozygous hemoglobin   variants (HbS, HgC, etc)do  not significantly interfere with this assay.     However, presence of multiple variants may affect accuracy.         03/16/2023               7.3 (H)             4.0 - 5.6 %           Final              Comment:    ADA Screening Guidelines:  5.7-6.4%  Consistent with   prediabetes  >or=6.5%  Consistent with diabetes    High levels of fetal   hemoglobin interfere with the HbA1C  assay. Heterozygous hemoglobin   variants (HbS, HgC, etc)do  not significantly interfere with this assay.     However, presence of multiple variants may affect accuracy.         12/15/2022               5.9 (H)             4.0 - 5.6 %           Final              Comment:    ADA Screening Guidelines:  5.7-6.4%  Consistent with   prediabetes  >or=6.5%  Consistent with diabetes    High levels of fetal   hemoglobin interfere with the HbA1C  assay. Heterozygous hemoglobin   variants (HbS, HgC, etc)do  not significantly interfere with this assay.     However, presence of multiple variants may affect accuracy.    ----------   Last edited by Hao Carr MA on 11/15/2023  2:13 PM.            Assessment /Plan     For exam results, see Encounter Report.    Type 2 diabetes mellitus without retinopathy    Nuclear sclerosis of both eyes    Hyperopia  with presbyopia of both eyes      No diabetic retinopathy, no csme. Return in 1 year for dilated eye exam.  2. Educated pt on presence of cataracts and effects on vision. No surgery at this time. Recheck in one year.  3. New [employer]acle Rx given, discussed different options for glasses. RTC 1 year routine eye exam.

## 2023-11-21 ENCOUNTER — LAB VISIT (OUTPATIENT)
Dept: PRIMARY CARE CLINIC | Facility: CLINIC | Age: 63
End: 2023-11-21
Payer: MEDICARE

## 2023-11-21 DIAGNOSIS — E11.8 TYPE 2 DIABETES MELLITUS WITH COMPLICATION, WITHOUT LONG-TERM CURRENT USE OF INSULIN: ICD-10-CM

## 2023-11-21 LAB
ALBUMIN SERPL BCP-MCNC: 4 G/DL (ref 3.5–5.2)
ALBUMIN/CREAT UR: 9.1 UG/MG (ref 0–30)
ALP SERPL-CCNC: 61 U/L (ref 55–135)
ALT SERPL W/O P-5'-P-CCNC: 25 U/L (ref 10–44)
ANION GAP SERPL CALC-SCNC: 12 MMOL/L (ref 8–16)
AST SERPL-CCNC: 26 U/L (ref 10–40)
BILIRUB SERPL-MCNC: 0.6 MG/DL (ref 0.1–1)
BUN SERPL-MCNC: 18 MG/DL (ref 8–23)
CALCIUM SERPL-MCNC: 9.8 MG/DL (ref 8.7–10.5)
CHLORIDE SERPL-SCNC: 105 MMOL/L (ref 95–110)
CO2 SERPL-SCNC: 21 MMOL/L (ref 23–29)
CREAT SERPL-MCNC: 1 MG/DL (ref 0.5–1.4)
CREAT UR-MCNC: 66 MG/DL (ref 23–375)
EST. GFR  (NO RACE VARIABLE): >60 ML/MIN/1.73 M^2
ESTIMATED AVG GLUCOSE: 140 MG/DL (ref 68–131)
GLUCOSE SERPL-MCNC: 153 MG/DL (ref 70–110)
HBA1C MFR BLD: 6.5 % (ref 4–5.6)
MICROALBUMIN UR DL<=1MG/L-MCNC: 6 UG/ML
POTASSIUM SERPL-SCNC: 4.7 MMOL/L (ref 3.5–5.1)
PROT SERPL-MCNC: 7.1 G/DL (ref 6–8.4)
SODIUM SERPL-SCNC: 138 MMOL/L (ref 136–145)

## 2023-11-21 PROCEDURE — 80053 COMPREHEN METABOLIC PANEL: CPT | Performed by: NURSE PRACTITIONER

## 2023-11-21 PROCEDURE — 82570 ASSAY OF URINE CREATININE: CPT | Performed by: NURSE PRACTITIONER

## 2023-11-21 PROCEDURE — 83036 HEMOGLOBIN GLYCOSYLATED A1C: CPT | Performed by: NURSE PRACTITIONER

## 2023-11-29 ENCOUNTER — OFFICE VISIT (OUTPATIENT)
Dept: ENDOCRINOLOGY | Facility: CLINIC | Age: 63
End: 2023-11-29
Payer: MEDICARE

## 2023-11-29 DIAGNOSIS — E66.01 MORBID OBESITY WITH BMI OF 40.0-44.9, ADULT: ICD-10-CM

## 2023-11-29 DIAGNOSIS — I25.10 CORONARY ARTERY DISEASE INVOLVING NATIVE CORONARY ARTERY OF NATIVE HEART WITHOUT ANGINA PECTORIS: ICD-10-CM

## 2023-11-29 DIAGNOSIS — I10 ESSENTIAL HYPERTENSION: ICD-10-CM

## 2023-11-29 DIAGNOSIS — E11.8 TYPE 2 DIABETES MELLITUS WITH COMPLICATION, WITHOUT LONG-TERM CURRENT USE OF INSULIN: Primary | ICD-10-CM

## 2023-11-29 DIAGNOSIS — E78.00 HYPERCHOLESTEROLEMIA: ICD-10-CM

## 2023-11-29 PROCEDURE — 3061F NEG MICROALBUMINURIA REV: CPT | Mod: CPTII,95,, | Performed by: NURSE PRACTITIONER

## 2023-11-29 PROCEDURE — 1160F RVW MEDS BY RX/DR IN RCRD: CPT | Mod: CPTII,95,, | Performed by: NURSE PRACTITIONER

## 2023-11-29 PROCEDURE — 1159F MED LIST DOCD IN RCRD: CPT | Mod: CPTII,95,, | Performed by: NURSE PRACTITIONER

## 2023-11-29 PROCEDURE — 3061F PR NEG MICROALBUMINURIA RESULT DOCUMENTED/REVIEW: ICD-10-PCS | Mod: CPTII,95,, | Performed by: NURSE PRACTITIONER

## 2023-11-29 PROCEDURE — 4010F PR ACE/ARB THEARPY RXD/TAKEN: ICD-10-PCS | Mod: CPTII,95,, | Performed by: NURSE PRACTITIONER

## 2023-11-29 PROCEDURE — 99214 OFFICE O/P EST MOD 30 MIN: CPT | Mod: 95,,, | Performed by: NURSE PRACTITIONER

## 2023-11-29 PROCEDURE — 1159F PR MEDICATION LIST DOCUMENTED IN MEDICAL RECORD: ICD-10-PCS | Mod: CPTII,95,, | Performed by: NURSE PRACTITIONER

## 2023-11-29 PROCEDURE — 1160F PR REVIEW ALL MEDS BY PRESCRIBER/CLIN PHARMACIST DOCUMENTED: ICD-10-PCS | Mod: CPTII,95,, | Performed by: NURSE PRACTITIONER

## 2023-11-29 PROCEDURE — 99214 PR OFFICE/OUTPT VISIT, EST, LEVL IV, 30-39 MIN: ICD-10-PCS | Mod: 95,,, | Performed by: NURSE PRACTITIONER

## 2023-11-29 PROCEDURE — 3066F NEPHROPATHY DOC TX: CPT | Mod: CPTII,95,, | Performed by: NURSE PRACTITIONER

## 2023-11-29 PROCEDURE — 4010F ACE/ARB THERAPY RXD/TAKEN: CPT | Mod: CPTII,95,, | Performed by: NURSE PRACTITIONER

## 2023-11-29 PROCEDURE — 3044F HG A1C LEVEL LT 7.0%: CPT | Mod: CPTII,95,, | Performed by: NURSE PRACTITIONER

## 2023-11-29 PROCEDURE — 3044F PR MOST RECENT HEMOGLOBIN A1C LEVEL <7.0%: ICD-10-PCS | Mod: CPTII,95,, | Performed by: NURSE PRACTITIONER

## 2023-11-29 PROCEDURE — 3066F PR DOCUMENTATION OF TREATMENT FOR NEPHROPATHY: ICD-10-PCS | Mod: CPTII,95,, | Performed by: NURSE PRACTITIONER

## 2023-11-29 RX ORDER — INSULIN GLARGINE 300 U/ML
58 INJECTION, SOLUTION SUBCUTANEOUS NIGHTLY
Qty: 18 ML | Refills: 3 | Status: SHIPPED | OUTPATIENT
Start: 2023-11-29 | End: 2024-11-28

## 2023-11-29 NOTE — PROGRESS NOTES
The patient location is:  [place]   The chief complaint leading to consultation is: Type 2 DM   Visit type: Virtual visit with synchronous audio and video  Total time spent with patient: 20 min  Each patient to whom he or she provides medical services by telemedicine is:  (1) informed of the relationship between the physician and patient and the respective role of any other health care provider with respect to management of the patient; and (2) notified that he or she may decline to receive medical services by telemedicine and may withdraw from such care at any time.       CC: Mr. Rashel Munson III arrives today for management of Type 2 DM and review of chronic medical conditions, as listed in the Visit Diagnosis section of this encounter.       HPI: Mr. Rashel Munson III was diagnosed with Type 2 DM in ~. He was diagnosed based on lab work. Initial treatment consisted of metformin. + FH of DM in maternal GM and uncles. Denies hospitalizations due to DM.   He has CAD and recently with 5 stent placements. Follows with Dr. Ogden.     He participates in the digital diabetes program.     Patient was last seen by me in July. Ozempic was added at that time and he was referred to Patient Assistance team.  He states that he didn't do the paperwork.     He previously used Mounjaro but had stopped due to cost. He investigated the price again and it had dropped a few hundred dollars so he chose to resume it. He thinks he must be out of hte donut hole.     He is requesting Mounjaro dose increase before the end of the year.     BG readings are checked 1x/day. Reviewed digital med report.   Fastin-200  PM readings: 130-150, one outlier 106    Hypoglycemia: Denies  Symptoms: lightheaded   Treatment: juice    Missing Insulin/PO medication doses: Rare.    Exercise: No formal     Dietary Habits: Eats 3 meals/day.  Rare snack.     Last DM education appointment: 2020        CURRENT DIABETIC MEDS: metformin 1000 mg  BID, glimepiride 4 mg BID, Jardiance 25 mg daily, Mounjaro 5 mg weekly, Toujeo Max 58 units QHS  Vial or pen: n/a  Glucometer type: Darlyn    Previous DM treatments:  Glyburide   Ozempic - expensive  Tresiba     Last Eye Exam: 11/2023, no DR   Last Podiatry Exam: n/a    REVIEW OF SYSTEMS  Constitutional: no c/o fatigue, weakness. + 5# weight loss.   Cardiac: no palpitations or chest pain.  Respiratory: no cough, dyspnea   GI: no c/o abdominal pain or nausea. Denies h/o pancreatitis.    Skin: no lesions or rashes.  Neuro: no c/o numbness, tingling, paresthesias.   Endocrine: denies polyphagia, polydipsia, polyuria. Denies personal or family h/o MTC, MEN2.      Personally reviewed Past Medical, Surgical, Social History.    Vital Signs  There were no vitals taken for this visit. -- video visit     Personally reviewed the below labs:    Hemoglobin A1C   Date Value Ref Range Status   11/21/2023 6.5 (H) 4.0 - 5.6 % Final     Comment:     ADA Screening Guidelines:  5.7-6.4%  Consistent with prediabetes  >or=6.5%  Consistent with diabetes    High levels of fetal hemoglobin interfere with the HbA1C  assay. Heterozygous hemoglobin variants (HbS, HgC, etc)do  not significantly interfere with this assay.   However, presence of multiple variants may affect accuracy.     07/27/2023 6.5 (H) 4.0 - 5.6 % Final     Comment:     ADA Screening Guidelines:  5.7-6.4%  Consistent with prediabetes  >or=6.5%  Consistent with diabetes    High levels of fetal hemoglobin interfere with the HbA1C  assay. Heterozygous hemoglobin variants (HbS, HgC, etc)do  not significantly interfere with this assay.   However, presence of multiple variants may affect accuracy.     03/16/2023 7.3 (H) 4.0 - 5.6 % Final     Comment:     ADA Screening Guidelines:  5.7-6.4%  Consistent with prediabetes  >or=6.5%  Consistent with diabetes    High levels of fetal hemoglobin interfere with the HbA1C  assay. Heterozygous hemoglobin variants (HbS, HgC, etc)do  not  significantly interfere with this assay.   However, presence of multiple variants may affect accuracy.         Chemistry        Component Value Date/Time     11/21/2023 1046    K 4.7 11/21/2023 1046     11/21/2023 1046    CO2 21 (L) 11/21/2023 1046    BUN 18 11/21/2023 1046    CREATININE 1.0 11/21/2023 1046     (H) 11/21/2023 1046        Component Value Date/Time    CALCIUM 9.8 11/21/2023 1046    ALKPHOS 61 11/21/2023 1046    AST 26 11/21/2023 1046    ALT 25 11/21/2023 1046    BILITOT 0.6 11/21/2023 1046    ESTGFRAFRICA >60.0 06/16/2022 0923    EGFRNONAA >60.0 06/16/2022 0923          Lab Results   Component Value Date    CHOL 107 (L) 07/27/2023    CHOL 98 (L) 06/16/2022    CHOL 99 (L) 03/03/2022     Lab Results   Component Value Date    HDL 23 (L) 07/27/2023    HDL 26 (L) 06/16/2022    HDL 24 (L) 03/03/2022     Lab Results   Component Value Date    LDLCALC 23.4 (L) 07/27/2023    LDLCALC 10.0 (L) 06/16/2022    LDLCALC Invalid, Trig>400.0 03/03/2022     Lab Results   Component Value Date    TRIG 303 (H) 07/27/2023    TRIG 310 (H) 06/16/2022    TRIG 417 (H) 03/03/2022     Lab Results   Component Value Date    CHOLHDL 21.5 07/27/2023    CHOLHDL 26.5 06/16/2022    CHOLHDL 24.2 03/03/2022       Lab Results   Component Value Date    MICALBCREAT 9.1 11/21/2023     Lab Results   Component Value Date    TSH 1.620 09/15/2022       CrCl cannot be calculated (Patient's most recent lab result is older than the maximum 7 days allowed.).    Vit D, 25-Hydroxy   Date Value Ref Range Status   09/15/2022 28 (L) 30 - 96 ng/mL Final     Comment:     Vitamin D deficiency.........<10 ng/mL                              Vitamin D insufficiency......10-29 ng/mL       Vitamin D sufficiency........> or equal to 30 ng/mL  Vitamin D toxicity............>100 ng/mL           PHYSICAL EXAMINATION  Deferred - video visit        Goals         80 <= Glucose <= 180 (pt-stated)       Blood Pressure < 130/80 (pt-stated)       Exercise  at least 150 minutes per week.       HEMOGLOBIN A1C < 7       Take at least one BP reading per week at various times of the day                 Assessment/Plan  1. Type 2 diabetes mellitus with complication, without long-term current use of insulin  -- A1c stable. Goal is to optimize Mounjaro dose and hopefully decrease insulin dose.   -- increase Mounjaro to 7.5 mg weekly   -- continue Jardiance, glimepiride, metformin, Toujeo Max at current doses.   -- once on Mounjaro 7.5 mg dose, recommended that he do a short trial phase off of Jardiance. If BG remain stable, he may be able to do without, which would then delay the start of dontonny tabares in 2024.   -- check BG 2x/day  -- takes ARB,statin, aspirin   2. Coronary artery disease involving native coronary artery of native heart without angina pectoris  -- optimize DM control.  -- follows with cardiology   3. Hypertension  -- continue current meds. Managed by cardiology   4. Hypercholesterolemia  -- uncontrolled with elevated TRG  -- optimize DM control  -- taking Crestor, fenofibrate   -- follows with cardiology   5. Morbid obesity with BMI of 40.0-44.9, adult  -- slightly improves since resuming Mounjaro   -- increasing insulin resistance.  There is no height or weight on file to calculate BMI.        FOLLOW UP  Follow up in about 4 months (around 3/29/2024).  Patient instructed to bring BG logs to each follow up   Patient encouraged to call for any BG/medication issues, concerns, or questions.    Orders Placed This Encounter   Procedures    Hemoglobin A1C    Comprehensive Metabolic Panel

## 2023-12-08 DIAGNOSIS — E11.8 TYPE 2 DIABETES MELLITUS WITH COMPLICATION, WITHOUT LONG-TERM CURRENT USE OF INSULIN: ICD-10-CM

## 2023-12-08 RX ORDER — PEN NEEDLE, DIABETIC 32 GX 1/4"
NEEDLE, DISPOSABLE MISCELLANEOUS
Qty: 100 EACH | Refills: 2 | Status: SHIPPED | OUTPATIENT
Start: 2023-12-08

## 2024-01-23 ENCOUNTER — PATIENT MESSAGE (OUTPATIENT)
Dept: ADMINISTRATIVE | Facility: OTHER | Age: 64
End: 2024-01-23
Payer: MEDICARE

## 2024-01-24 ENCOUNTER — PATIENT MESSAGE (OUTPATIENT)
Dept: ADMINISTRATIVE | Facility: OTHER | Age: 64
End: 2024-01-24
Payer: MEDICARE

## 2024-02-17 ENCOUNTER — PATIENT MESSAGE (OUTPATIENT)
Dept: ADMINISTRATIVE | Facility: CLINIC | Age: 64
End: 2024-02-17
Payer: MEDICARE

## 2024-02-21 ENCOUNTER — OFFICE VISIT (OUTPATIENT)
Dept: HOME HEALTH SERVICES | Facility: CLINIC | Age: 64
End: 2024-02-21
Payer: MEDICARE

## 2024-02-21 DIAGNOSIS — I25.118 CORONARY ARTERY DISEASE INVOLVING NATIVE CORONARY ARTERY OF NATIVE HEART WITH OTHER FORM OF ANGINA PECTORIS: ICD-10-CM

## 2024-02-21 DIAGNOSIS — Z79.4 TYPE 2 DIABETES MELLITUS WITH OTHER SPECIFIED COMPLICATION, WITH LONG-TERM CURRENT USE OF INSULIN: ICD-10-CM

## 2024-02-21 DIAGNOSIS — I70.0 THORACIC AORTA ATHEROSCLEROSIS: ICD-10-CM

## 2024-02-21 DIAGNOSIS — Z00.00 ENCOUNTER FOR PREVENTIVE HEALTH EXAMINATION: Primary | ICD-10-CM

## 2024-02-21 DIAGNOSIS — E11.69 TYPE 2 DIABETES MELLITUS WITH OTHER SPECIFIED COMPLICATION, WITH LONG-TERM CURRENT USE OF INSULIN: ICD-10-CM

## 2024-02-21 DIAGNOSIS — E11.8 TYPE 2 DIABETES MELLITUS WITH COMPLICATION, WITHOUT LONG-TERM CURRENT USE OF INSULIN: ICD-10-CM

## 2024-02-21 DIAGNOSIS — E66.01 MORBID OBESITY WITH BMI OF 40.0-44.9, ADULT: ICD-10-CM

## 2024-02-21 DIAGNOSIS — I50.22 CHRONIC SYSTOLIC CONGESTIVE HEART FAILURE: ICD-10-CM

## 2024-02-21 PROCEDURE — 1160F RVW MEDS BY RX/DR IN RCRD: CPT | Mod: CPTII,95,, | Performed by: NURSE PRACTITIONER

## 2024-02-21 PROCEDURE — 1159F MED LIST DOCD IN RCRD: CPT | Mod: CPTII,95,, | Performed by: NURSE PRACTITIONER

## 2024-02-21 PROCEDURE — 3072F LOW RISK FOR RETINOPATHY: CPT | Mod: CPTII,95,, | Performed by: NURSE PRACTITIONER

## 2024-02-21 PROCEDURE — G0439 PPPS, SUBSEQ VISIT: HCPCS | Mod: 95,,, | Performed by: NURSE PRACTITIONER

## 2024-02-21 NOTE — PATIENT INSTRUCTIONS
Counseling and Referral of Other Preventative  (Italic type indicates deductible and co-insurance are waived)    Patient Name: Rashel Munson  Today's Date: 2/21/2024    Health Maintenance       Date Due Completion Date    HIV Screening Never done ---    Shingles Vaccine (1 of 2) Never done ---    RSV Vaccine (Age 60+ and Pregnant patients) (1 - 1-dose 60+ series) Never done ---    Pneumococcal Vaccines (Age 0-64) (2 of 2 - PCV) 08/19/2021 8/19/2020    COVID-19 Vaccine (4 - 2023-24 season) 09/01/2023 12/23/2021    Hemoglobin A1c 05/21/2024 11/21/2023    Lipid Panel 07/27/2024 7/27/2023    Foot Exam 07/31/2024 7/31/2023    High Dose Statin 10/02/2024 10/2/2023    Eye Exam 11/15/2024 11/15/2023    Diabetes Urine Screening 11/21/2024 11/21/2023    TETANUS VACCINE 08/19/2030 8/19/2020    Colorectal Cancer Screening 08/12/2031 8/12/2021        No orders of the defined types were placed in this encounter.      The following information is provided to all patients.  This information is to help you find resources for any of the problems found today that may be affecting your health:                  Living healthy guide: www.[facility].[place].gov      Understanding Diabetes: www.diabetes.org      Eating healthy: www.cdc.gov/healthyweight      CDC home safety checklist: www.cdc.gov/steadi/patient.html      Agency on Aging: www.goea.[place].gov      Alcoholics anonymous (AA): www.aa.org      Physical Activity: www.benjamin.nih.gov/oe3svtb      Tobacco use: www.quitwithusla.org

## 2024-02-21 NOTE — PROGRESS NOTES
The patient location is: [place]  The chief complaint leading to consultation is: awv    Visit type: audiovisual    Face to Face time with patient: 60  60 minutes of total time spent on the encounter, which includes face to face time and non-face to face time preparing to see the patient (eg, review of tests), Obtaining and/or reviewing separately obtained history, Documenting clinical information in the electronic or other health record, Independently interpreting results (not separately reported) and communicating results to the patient/family/caregiver, or Care coordination (not separately reported).         Each patient to whom he or she provides medical services by telemedicine is:  (1) informed of the relationship between the physician and patient and the respective role of any other health care provider with respect to management of the patient; and (2) notified that he or she may decline to receive medical services by telemedicine and may withdraw from such care at any time.    Notes:       Rashel Munson presented for an initial Medicare AWV today. The following components were reviewed and updated:    Medical history  Family History  Social history  Allergies and Current Medications  Health Risk Assessment  Health Maintenance  Care Team    **See Completed Assessments for Annual Wellness visit with in the encounter summary    The following assessments were completed:  Depression Screening  Cognitive function Screening  Timed Get Up Test  Whisper Test      Opioid documentation:      Patient does not have a current opioid prescription.          There were no vitals filed for this visit.  There is no height or weight on file to calculate BMI.       Physical Exam  Constitutional:       Appearance: Normal appearance.   Neurological:      Mental Status: He is alert.   Psychiatric:         Attention and Perception: Attention and perception normal.         Mood and Affect: Mood and affect normal.         Speech:  Speech normal.         Behavior: Behavior normal. Behavior is cooperative.         Thought Content: Thought content normal.         Cognition and Memory: Cognition and memory normal.         Judgment: Judgment normal.           Diagnoses and health risks identified today and associated recommendations/orders:  1. Encounter for preventive health examination  Stable, followed by provider    2. Morbid obesity with BMI of 40.0-44.9, adult  Stable, followed by provider    3. Chronic systolic congestive heart failure  Stable, followed by provider, takes amlodipine, aspirin, hydralazine, nebivolol, spironolactone, valsartan     4. Type 2 diabetes mellitus with complication, without long-term current use of insulin  Stable, followed by provider, takes Tirzepatide, metformin, toujeo     5. Coronary artery disease involving native coronary artery of native heart with other form of angina pectoris  Stable, followed by provider, takes amlodipine, aspirin, hydralazine, nebivolol, spironolactone, valsartan     6. Thoracic aorta atherosclerosis  Stable, followed by provider, takes amlodipine, aspirin, hydralazine, nebivolol, spironolactone, valsartan           Provided Rashel with a 5-10 year written screening schedule and personal prevention plan. Recommendations were developed using the USPSTF age appropriate recommendations. Education, counseling, and referrals were provided as needed.  After Visit Summary printed and given to patient which includes a list of additional screenings\tests needed.    Follow up in about 1 year (around 2/21/2025) for your next annual wellness visit.      Kin Jones NP  I offered to discuss advanced care planning, including how to pick a person who would make decisions for you if you were unable to make them for yourself, called a health care power of , and what kind of decisions you might make such as use of life sustaining treatments such as ventilators and tube feeding when faced with  a life limiting illness recorded on a living will that they will need to know. (How you want to be cared for as you near the end of your natural life)     X Patient is interested in learning more about how to make advanced directives.  I provided them paperwork and offered to discuss this with them.

## 2024-03-21 ENCOUNTER — LAB VISIT (OUTPATIENT)
Dept: PRIMARY CARE CLINIC | Facility: CLINIC | Age: 64
End: 2024-03-21
Payer: MEDICARE

## 2024-03-21 DIAGNOSIS — E11.8 TYPE 2 DIABETES MELLITUS WITH COMPLICATION, WITHOUT LONG-TERM CURRENT USE OF INSULIN: ICD-10-CM

## 2024-03-21 LAB
ALBUMIN SERPL BCP-MCNC: 4 G/DL (ref 3.5–5.2)
ALP SERPL-CCNC: 72 U/L (ref 55–135)
ALT SERPL W/O P-5'-P-CCNC: 22 U/L (ref 10–44)
ANION GAP SERPL CALC-SCNC: 11 MMOL/L (ref 8–16)
AST SERPL-CCNC: 18 U/L (ref 10–40)
BILIRUB SERPL-MCNC: 0.4 MG/DL (ref 0.1–1)
BUN SERPL-MCNC: 26 MG/DL (ref 8–23)
CALCIUM SERPL-MCNC: 9.5 MG/DL (ref 8.7–10.5)
CHLORIDE SERPL-SCNC: 105 MMOL/L (ref 95–110)
CO2 SERPL-SCNC: 22 MMOL/L (ref 23–29)
CREAT SERPL-MCNC: 1.1 MG/DL (ref 0.5–1.4)
EST. GFR  (NO RACE VARIABLE): >60 ML/MIN/1.73 M^2
ESTIMATED AVG GLUCOSE: 137 MG/DL (ref 68–131)
GLUCOSE SERPL-MCNC: 179 MG/DL (ref 70–110)
HBA1C MFR BLD: 6.4 % (ref 4–5.6)
POTASSIUM SERPL-SCNC: 4.4 MMOL/L (ref 3.5–5.1)
PROT SERPL-MCNC: 6.9 G/DL (ref 6–8.4)
SODIUM SERPL-SCNC: 138 MMOL/L (ref 136–145)

## 2024-03-21 PROCEDURE — 83036 HEMOGLOBIN GLYCOSYLATED A1C: CPT | Performed by: NURSE PRACTITIONER

## 2024-03-21 PROCEDURE — 36415 COLL VENOUS BLD VENIPUNCTURE: CPT | Mod: S$GLB,,, | Performed by: NURSE PRACTITIONER

## 2024-03-21 PROCEDURE — 80053 COMPREHEN METABOLIC PANEL: CPT | Performed by: NURSE PRACTITIONER

## 2024-03-21 RX ORDER — METFORMIN HYDROCHLORIDE 1000 MG/1
TABLET ORAL
Qty: 180 TABLET | Refills: 3 | Status: SHIPPED | OUTPATIENT
Start: 2024-03-21

## 2024-04-02 ENCOUNTER — OFFICE VISIT (OUTPATIENT)
Dept: ENDOCRINOLOGY | Facility: CLINIC | Age: 64
End: 2024-04-02
Payer: MEDICARE

## 2024-04-02 ENCOUNTER — OFFICE VISIT (OUTPATIENT)
Dept: FAMILY MEDICINE | Facility: CLINIC | Age: 64
End: 2024-04-02
Payer: MEDICARE

## 2024-04-02 VITALS
DIASTOLIC BLOOD PRESSURE: 72 MMHG | OXYGEN SATURATION: 96 % | SYSTOLIC BLOOD PRESSURE: 136 MMHG | HEART RATE: 75 BPM | WEIGHT: 272.38 LBS | BODY MASS INDEX: 40.34 KG/M2 | HEIGHT: 69 IN

## 2024-04-02 DIAGNOSIS — I25.118 CORONARY ARTERY DISEASE INVOLVING NATIVE CORONARY ARTERY OF NATIVE HEART WITH OTHER FORM OF ANGINA PECTORIS: ICD-10-CM

## 2024-04-02 DIAGNOSIS — Z12.5 SCREENING FOR PROSTATE CANCER: ICD-10-CM

## 2024-04-02 DIAGNOSIS — I10 ESSENTIAL HYPERTENSION: ICD-10-CM

## 2024-04-02 DIAGNOSIS — Z79.4 TYPE 2 DIABETES MELLITUS WITH OTHER SPECIFIED COMPLICATION, WITH LONG-TERM CURRENT USE OF INSULIN: Primary | ICD-10-CM

## 2024-04-02 DIAGNOSIS — E78.00 HYPERCHOLESTEROLEMIA: ICD-10-CM

## 2024-04-02 DIAGNOSIS — R25.1 TREMOR OF RIGHT HAND: ICD-10-CM

## 2024-04-02 DIAGNOSIS — I25.10 CORONARY ARTERY DISEASE INVOLVING NATIVE CORONARY ARTERY OF NATIVE HEART WITHOUT ANGINA PECTORIS: ICD-10-CM

## 2024-04-02 DIAGNOSIS — E78.2 MIXED HYPERLIPIDEMIA: ICD-10-CM

## 2024-04-02 DIAGNOSIS — E11.69 TYPE 2 DIABETES MELLITUS WITH OTHER SPECIFIED COMPLICATION, WITH LONG-TERM CURRENT USE OF INSULIN: Primary | ICD-10-CM

## 2024-04-02 DIAGNOSIS — R06.09 CHRONIC DYSPNEA: ICD-10-CM

## 2024-04-02 DIAGNOSIS — E66.01 MORBID OBESITY WITH BMI OF 40.0-44.9, ADULT: ICD-10-CM

## 2024-04-02 DIAGNOSIS — E11.8 TYPE 2 DIABETES MELLITUS WITH COMPLICATION, WITHOUT LONG-TERM CURRENT USE OF INSULIN: Primary | ICD-10-CM

## 2024-04-02 PROBLEM — I50.22 CHRONIC SYSTOLIC CONGESTIVE HEART FAILURE: Status: RESOLVED | Noted: 2023-05-05 | Resolved: 2024-04-02

## 2024-04-02 PROCEDURE — 1159F MED LIST DOCD IN RCRD: CPT | Mod: CPTII,S$GLB,, | Performed by: INTERNAL MEDICINE

## 2024-04-02 PROCEDURE — 99214 OFFICE O/P EST MOD 30 MIN: CPT | Mod: S$GLB,,, | Performed by: INTERNAL MEDICINE

## 2024-04-02 PROCEDURE — 1160F RVW MEDS BY RX/DR IN RCRD: CPT | Mod: CPTII,95,, | Performed by: NURSE PRACTITIONER

## 2024-04-02 PROCEDURE — 3078F DIAST BP <80 MM HG: CPT | Mod: CPTII,S$GLB,, | Performed by: INTERNAL MEDICINE

## 2024-04-02 PROCEDURE — 3075F SYST BP GE 130 - 139MM HG: CPT | Mod: CPTII,S$GLB,, | Performed by: INTERNAL MEDICINE

## 2024-04-02 PROCEDURE — 3072F LOW RISK FOR RETINOPATHY: CPT | Mod: CPTII,S$GLB,, | Performed by: INTERNAL MEDICINE

## 2024-04-02 PROCEDURE — 1160F RVW MEDS BY RX/DR IN RCRD: CPT | Mod: CPTII,S$GLB,, | Performed by: INTERNAL MEDICINE

## 2024-04-02 PROCEDURE — 99999 PR PBB SHADOW E&M-EST. PATIENT-LVL IV: CPT | Mod: PBBFAC,,, | Performed by: INTERNAL MEDICINE

## 2024-04-02 PROCEDURE — G2211 COMPLEX E/M VISIT ADD ON: HCPCS | Mod: 95,,, | Performed by: NURSE PRACTITIONER

## 2024-04-02 PROCEDURE — 3044F HG A1C LEVEL LT 7.0%: CPT | Mod: CPTII,95,, | Performed by: NURSE PRACTITIONER

## 2024-04-02 PROCEDURE — 3072F LOW RISK FOR RETINOPATHY: CPT | Mod: CPTII,95,, | Performed by: NURSE PRACTITIONER

## 2024-04-02 PROCEDURE — 99214 OFFICE O/P EST MOD 30 MIN: CPT | Mod: 95,,, | Performed by: NURSE PRACTITIONER

## 2024-04-02 PROCEDURE — 3008F BODY MASS INDEX DOCD: CPT | Mod: CPTII,S$GLB,, | Performed by: INTERNAL MEDICINE

## 2024-04-02 PROCEDURE — 3044F HG A1C LEVEL LT 7.0%: CPT | Mod: CPTII,S$GLB,, | Performed by: INTERNAL MEDICINE

## 2024-04-02 PROCEDURE — 1159F MED LIST DOCD IN RCRD: CPT | Mod: CPTII,95,, | Performed by: NURSE PRACTITIONER

## 2024-04-02 NOTE — PROGRESS NOTES
"The patient location is:  [place]   The chief complaint leading to consultation is: Type 2 DM   Visit type: Virtual visit with synchronous audio and video  Total time spent with patient: 12 min  Each patient to whom he or she provides medical services by telemedicine is:  (1) informed of the relationship between the physician and patient and the respective role of any other health care provider with respect to management of the patient; and (2) notified that he or she may decline to receive medical services by telemedicine and may withdraw from such care at any time.       CC: Mr. Rashel Munson III arrives today for management of Type 2 DM and review of chronic medical conditions, as listed in the Visit Diagnosis section of this encounter.       HPI: Mr. Rashel Munson III was diagnosed with Type 2 DM in ~2010. He was diagnosed based on lab work. Initial treatment consisted of metformin. + FH of DM in maternal GM and uncles. Denies hospitalizations due to DM.   He has CAD and recently with 5 stent placements. Follows with Dr. Ogden.     He participates in the digital diabetes program.     Patient was last seen by me in November. Mounjaro dose was increased at that time.     He states that he has been "weaning off of Jardiance." Past contributor to donut hole. We discussed and feel that if he has to choose either Jardiance or Mounjaro, Mounjaro may be more effective for him.    BG readings are checked 1x/day. Reviewed digital med report. BG range mid 100s with outlier of 78 and 6 readings > 200.    Hypoglycemia: No  Symptoms: lightheaded   Treatment: juice    Missing Insulin/PO medication doses: Rare.    Exercise: No formal     Dietary Habits: Eats 3 meals/day.  Rare snack.     Last DM education appointment: 9/2020        CURRENT DIABETIC MEDS: metformin 1000 mg BID, glimepiride 4 mg BID, Jardiance 25 mg daily, Mounjaro 7.5 mg weekly, Toujeo Max 58 units QHS  Vial or pen: n/a  Glucometer type: " Darlyn    Previous DM treatments:  Glyburide   Ozempic - expensive  Tresiba     Last Eye Exam: 11/2023, no DR   Last Podiatry Exam: n/a    REVIEW OF SYSTEMS  Constitutional: no c/o fatigue, weakness, weight loss.   Cardiac: no palpitations or chest pain.  Respiratory: no cough, dyspnea   GI: no c/o abdominal pain or nausea. Denies h/o pancreatitis.    Skin: no lesions or rashes.  Neuro: no c/o numbness, tingling, paresthesias.   Endocrine: denies polyphagia, polydipsia, polyuria. Denies personal or family h/o MTC, MEN2.      Personally reviewed Past Medical, Surgical, Social History.    Vital Signs  There were no vitals taken for this visit. -- video visit     Personally reviewed the below labs:    Hemoglobin A1C   Date Value Ref Range Status   03/21/2024 6.4 (H) 4.0 - 5.6 % Final     Comment:     ADA Screening Guidelines:  5.7-6.4%  Consistent with prediabetes  >or=6.5%  Consistent with diabetes    High levels of fetal hemoglobin interfere with the HbA1C  assay. Heterozygous hemoglobin variants (HbS, HgC, etc)do  not significantly interfere with this assay.   However, presence of multiple variants may affect accuracy.     11/21/2023 6.5 (H) 4.0 - 5.6 % Final     Comment:     ADA Screening Guidelines:  5.7-6.4%  Consistent with prediabetes  >or=6.5%  Consistent with diabetes    High levels of fetal hemoglobin interfere with the HbA1C  assay. Heterozygous hemoglobin variants (HbS, HgC, etc)do  not significantly interfere with this assay.   However, presence of multiple variants may affect accuracy.     07/27/2023 6.5 (H) 4.0 - 5.6 % Final     Comment:     ADA Screening Guidelines:  5.7-6.4%  Consistent with prediabetes  >or=6.5%  Consistent with diabetes    High levels of fetal hemoglobin interfere with the HbA1C  assay. Heterozygous hemoglobin variants (HbS, HgC, etc)do  not significantly interfere with this assay.   However, presence of multiple variants may affect accuracy.         Chemistry        Component  Value Date/Time     03/21/2024 1034    K 4.4 03/21/2024 1034     03/21/2024 1034    CO2 22 (L) 03/21/2024 1034    BUN 26 (H) 03/21/2024 1034    CREATININE 1.1 03/21/2024 1034     (H) 03/21/2024 1034        Component Value Date/Time    CALCIUM 9.5 03/21/2024 1034    ALKPHOS 72 03/21/2024 1034    AST 18 03/21/2024 1034    ALT 22 03/21/2024 1034    BILITOT 0.4 03/21/2024 1034    ESTGFRAFRICA >60.0 06/16/2022 0923    EGFRNONAA >60.0 06/16/2022 0923          Lab Results   Component Value Date    CHOL 107 (L) 07/27/2023    CHOL 98 (L) 06/16/2022    CHOL 99 (L) 03/03/2022     Lab Results   Component Value Date    HDL 23 (L) 07/27/2023    HDL 26 (L) 06/16/2022    HDL 24 (L) 03/03/2022     Lab Results   Component Value Date    LDLCALC 23.4 (L) 07/27/2023    LDLCALC 10.0 (L) 06/16/2022    LDLCALC Invalid, Trig>400.0 03/03/2022     Lab Results   Component Value Date    TRIG 303 (H) 07/27/2023    TRIG 310 (H) 06/16/2022    TRIG 417 (H) 03/03/2022     Lab Results   Component Value Date    CHOLHDL 21.5 07/27/2023    CHOLHDL 26.5 06/16/2022    CHOLHDL 24.2 03/03/2022       Lab Results   Component Value Date    MICALBCREAT 9.1 11/21/2023     Lab Results   Component Value Date    TSH 1.620 09/15/2022       CrCl cannot be calculated (Patient's most recent lab result is older than the maximum 7 days allowed.).    Vit D, 25-Hydroxy   Date Value Ref Range Status   09/15/2022 28 (L) 30 - 96 ng/mL Final     Comment:     Vitamin D deficiency.........<10 ng/mL                              Vitamin D insufficiency......10-29 ng/mL       Vitamin D sufficiency........> or equal to 30 ng/mL  Vitamin D toxicity............>100 ng/mL           PHYSICAL EXAMINATION  Deferred - video visit        Goals         80 <= Glucose <= 180 (pt-stated)       Blood Pressure < 130/80 (pt-stated)       Exercise at least 150 minutes per week.       HEMOGLOBIN A1C < 7       Take at least one BP reading per week at various times of the day                  Assessment/Plan  1. Type 2 diabetes mellitus with complication, without long-term current use of insulin  -- A1c stable. Will increase Mounjaro dose to assist with decreasing insulin resistance/insulin dose, as well as weight.   -- increase Mounjaro to 10 mg weekly   -- continue glimepiride, metformin, Toujeo Max at current doses.   -- discontinue Jardiance to reduce donut hole potential.   -- check BG 2x/day  -- takes ARB,statin, aspirin   2. Coronary artery disease involving native coronary artery of native heart without angina pectoris  -- optimize DM control.  -- follows with cardiology   3. Hypertension  -- continue current meds.   -- Managed by cardiology   4. Hypercholesterolemia  -- uncontrolled with elevated TRG  -- optimize DM control  -- taking Crestor, fenofibrate   -- follows with cardiology  -- PCP to check lipid panel   5. Morbid obesity with BMI of 40.0-44.9, adult  -- increasing insulin resistance.  There is no height or weight on file to calculate BMI.        FOLLOW UP  Follow up in about 6 months (around 10/2/2024).  Patient instructed to bring BG logs to each follow up   Patient encouraged to call for any BG/medication issues, concerns, or questions.    Orders Placed This Encounter   Procedures    Comprehensive Metabolic Panel

## 2024-04-02 NOTE — PROGRESS NOTES
Patient ID: Rashel Munson III is a 64 y.o. male.    Chief Complaint: Follow-up      Assessment / Plan      1. Type 2 diabetes mellitus with other specified complication, with long-term current use of insulin  Overview:  Controlled.  Following with endo.  Has virtual visit today.  May discontinue Jardiance to help with cost of medications.      Orders:  -     Hemoglobin A1C; Future; Expected date: 04/02/2024  -     Microalbumin/Creatinine Ratio, Urine; Future; Expected date: 04/02/2024    2. Mixed hyperlipidemia  Overview:  LDL at goal but triglycerides elevated.  Continue fenofibrate and Crestor    Orders:  -     Lipid Panel; Future; Expected date: 04/02/2024    3. Essential hypertension  Overview:  Controlled, continue regimen listed.    Orders:  -     CBC Auto Differential; Future; Expected date: 04/02/2024    4. Coronary artery disease involving native coronary artery of native heart with other form of angina pectoris  Overview:  Five stents  Stable, continue aspirin, beta-blocker, statin  Continue follow-up with Cardiology.      5. Chronic dyspnea  Overview:  Seeing Dr. Ogden and metoprolol discontinued and he was put on ranexa and bistolic. Dyspnea has improved.       6. Screening for prostate cancer  -     PSA, Screening; Future; Expected date: 10/01/2024    7. Tremor of right hand  Overview:  Onset 2023. Family history of parkinson disease. hand tremor occurs when he does not feel well. Resting and with activity.              HPI     Six-month follow-up    Tremor.     Review of Systems   Constitutional:  Negative for fever.   Respiratory:  Negative for shortness of breath.    Cardiovascular:  Negative for chest pain.   Gastrointestinal:  Negative for abdominal pain.      Objective     Vitals:    04/02/24 1054   BP: 136/72   Pulse: 75      Wt Readings from Last 3 Encounters:   04/02/24 1054 123.5 kg (272 lb 6.1 oz)   03/08/24 1055 125.4 kg (276 lb 6.4 oz)   10/02/23 1105 128.1 kg (282 lb 6.6 oz)       Body mass index is 40.22 kg/m².     Physical Exam  Cardiovascular:      Rate and Rhythm: Normal rate and regular rhythm.      Heart sounds: No murmur heard.     No gallop.   Pulmonary:      Breath sounds: Normal breath sounds. No wheezing or rhonchi.   Abdominal:      Palpations: Abdomen is soft.      Tenderness: There is no abdominal tenderness.         Diabetes Medications               empagliflozin (JARDIANCE) 25 mg tablet Take 1 tablet (25 mg total) by mouth once daily.    glimepiride (AMARYL) 4 MG tablet TAKE 1 TABLET TWICE A DAY WITH MEALS    insulin glargine U-300 conc (TOUJEO MAX U-300 SOLOSTAR) 300 unit/mL (3 mL) insulin pen Inject 58 Units into the skin every evening.    metFORMIN (GLUCOPHAGE) 1000 MG tablet TAKE 1 TABLET TWICE A DAY WITH MEALS    tirzepatide 7.5 mg/0.5 mL PnIj Inject 7.5 mg into the skin every 7 days.           Hypertension Medications               amLODIPine (NORVASC) 10 MG tablet Take 1 tablet (10 mg total) by mouth once daily.    hydrALAZINE (APRESOLINE) 100 MG tablet Take 1 tablet (100 mg total) by mouth 2 (two) times daily.    nebivoloL (BYSTOLIC) 10 MG Tab Take 1 tablet (10 mg total) by mouth once daily.    spironolactone (ALDACTONE) 25 MG tablet Take 1 tablet (25 mg total) by mouth once daily.    valsartan (DIOVAN) 320 MG tablet Take 1 tablet (320 mg total) by mouth once daily.    nitroGLYCERIN (NITROSTAT) 0.4 MG SL tablet Place 1 tablet (0.4 mg total) under the tongue every 5 (five) minutes as needed.           Hyperlipidemia Medications               fenofibrate (TRICOR) 145 MG tablet Take 1 tablet (145 mg total) by mouth once daily.    rosuvastatin (CRESTOR) 5 MG tablet Take 5 mg by mouth once daily.           Medication List with Changes/Refills   Current Medications    AMLODIPINE (NORVASC) 10 MG TABLET    Take 1 tablet (10 mg total) by mouth once daily.    AMOXICILLIN (AMOXIL) 500 MG TAB    TAKE FOUR TS PO 1 HOUR B DAPP    ASPIRIN (ECOTRIN) 81 MG EC TABLET    Take 81 mg by  "mouth every evening.    BD ULTRA-FINE MICRO PEN NEEDLE 32 GAUGE X 1/4" NDLE    USE 1 NEEDLE DAILY WITH INSULIN    EMPAGLIFLOZIN (JARDIANCE) 25 MG TABLET    Take 1 tablet (25 mg total) by mouth once daily.    FENOFIBRATE (TRICOR) 145 MG TABLET    Take 1 tablet (145 mg total) by mouth once daily.    GLIMEPIRIDE (AMARYL) 4 MG TABLET    TAKE 1 TABLET TWICE A DAY WITH MEALS    HYDRALAZINE (APRESOLINE) 100 MG TABLET    Take 1 tablet (100 mg total) by mouth 2 (two) times daily.    INSULIN GLARGINE U-300 CONC (TOUJEO MAX U-300 SOLOSTAR) 300 UNIT/ML (3 ML) INSULIN PEN    Inject 58 Units into the skin every evening.    METFORMIN (GLUCOPHAGE) 1000 MG TABLET    TAKE 1 TABLET TWICE A DAY WITH MEALS    NEBIVOLOL (BYSTOLIC) 10 MG TAB    Take 1 tablet (10 mg total) by mouth once daily.    NITROGLYCERIN (NITROSTAT) 0.4 MG SL TABLET    Place 1 tablet (0.4 mg total) under the tongue every 5 (five) minutes as needed.    PANTOPRAZOLE (PROTONIX) 40 MG TABLET    Take 40 mg by mouth 2 (two) times daily.    RANOLAZINE (RANEXA) 500 MG TB12    Take 1 tablet (500 mg total) by mouth 2 (two) times daily.    ROSUVASTATIN (CRESTOR) 5 MG TABLET    Take 5 mg by mouth once daily.    SPIRONOLACTONE (ALDACTONE) 25 MG TABLET    Take 1 tablet (25 mg total) by mouth once daily.    TIRZEPATIDE 7.5 MG/0.5 ML PNIJ    Inject 7.5 mg into the skin every 7 days.    VALSARTAN (DIOVAN) 320 MG TABLET    Take 1 tablet (320 mg total) by mouth once daily.       I personally reviewed past medical, family and social history.         "

## 2024-07-02 ENCOUNTER — PATIENT MESSAGE (OUTPATIENT)
Dept: ENDOCRINOLOGY | Facility: CLINIC | Age: 64
End: 2024-07-02
Payer: MEDICARE

## 2024-07-02 DIAGNOSIS — E11.8 TYPE 2 DIABETES MELLITUS WITH COMPLICATION, WITHOUT LONG-TERM CURRENT USE OF INSULIN: Primary | ICD-10-CM

## 2024-07-02 RX ORDER — TIRZEPATIDE 12.5 MG/.5ML
12.5 INJECTION, SOLUTION SUBCUTANEOUS
Qty: 6 ML | Refills: 3 | Status: SHIPPED | OUTPATIENT
Start: 2024-07-02

## 2024-09-05 ENCOUNTER — LAB VISIT (OUTPATIENT)
Dept: PRIMARY CARE CLINIC | Facility: CLINIC | Age: 64
End: 2024-09-05
Payer: MEDICARE

## 2024-09-05 DIAGNOSIS — E11.69 TYPE 2 DIABETES MELLITUS WITH OTHER SPECIFIED COMPLICATION, WITH LONG-TERM CURRENT USE OF INSULIN: ICD-10-CM

## 2024-09-05 DIAGNOSIS — I10 ESSENTIAL HYPERTENSION: ICD-10-CM

## 2024-09-05 DIAGNOSIS — Z79.4 TYPE 2 DIABETES MELLITUS WITH OTHER SPECIFIED COMPLICATION, WITH LONG-TERM CURRENT USE OF INSULIN: ICD-10-CM

## 2024-09-05 DIAGNOSIS — Z12.5 SCREENING FOR PROSTATE CANCER: ICD-10-CM

## 2024-09-05 DIAGNOSIS — E11.8 TYPE 2 DIABETES MELLITUS WITH COMPLICATION, WITHOUT LONG-TERM CURRENT USE OF INSULIN: ICD-10-CM

## 2024-09-05 DIAGNOSIS — E78.2 MIXED HYPERLIPIDEMIA: ICD-10-CM

## 2024-09-05 LAB
ALBUMIN SERPL BCP-MCNC: 3.9 G/DL (ref 3.5–5.2)
ALBUMIN/CREAT UR: 4.9 UG/MG (ref 0–30)
ALP SERPL-CCNC: 52 U/L (ref 55–135)
ALT SERPL W/O P-5'-P-CCNC: 28 U/L (ref 10–44)
ANION GAP SERPL CALC-SCNC: 9 MMOL/L (ref 8–16)
AST SERPL-CCNC: 27 U/L (ref 10–40)
BASOPHILS # BLD AUTO: 0.1 K/UL (ref 0–0.2)
BASOPHILS NFR BLD: 1.5 % (ref 0–1.9)
BILIRUB SERPL-MCNC: 0.5 MG/DL (ref 0.1–1)
BUN SERPL-MCNC: 16 MG/DL (ref 8–23)
CALCIUM SERPL-MCNC: 9.3 MG/DL (ref 8.7–10.5)
CHLORIDE SERPL-SCNC: 104 MMOL/L (ref 95–110)
CHOLEST SERPL-MCNC: 86 MG/DL (ref 120–199)
CHOLEST/HDLC SERPL: 3.3 {RATIO} (ref 2–5)
CO2 SERPL-SCNC: 22 MMOL/L (ref 23–29)
COMPLEXED PSA SERPL-MCNC: 0.31 NG/ML (ref 0–4)
CREAT SERPL-MCNC: 1.1 MG/DL (ref 0.5–1.4)
CREAT UR-MCNC: 142 MG/DL (ref 23–375)
DIFFERENTIAL METHOD BLD: ABNORMAL
EOSINOPHIL # BLD AUTO: 0.2 K/UL (ref 0–0.5)
EOSINOPHIL NFR BLD: 2.9 % (ref 0–8)
ERYTHROCYTE [DISTWIDTH] IN BLOOD BY AUTOMATED COUNT: 13 % (ref 11.5–14.5)
EST. GFR  (NO RACE VARIABLE): >60 ML/MIN/1.73 M^2
ESTIMATED AVG GLUCOSE: 137 MG/DL (ref 68–131)
GLUCOSE SERPL-MCNC: 174 MG/DL (ref 70–110)
HBA1C MFR BLD: 6.4 % (ref 4–5.6)
HCT VFR BLD AUTO: 43.4 % (ref 40–54)
HDLC SERPL-MCNC: 26 MG/DL (ref 40–75)
HDLC SERPL: 30.2 % (ref 20–50)
HGB BLD-MCNC: 14.7 G/DL (ref 14–18)
IMM GRANULOCYTES # BLD AUTO: 0.24 K/UL (ref 0–0.04)
IMM GRANULOCYTES NFR BLD AUTO: 3.7 % (ref 0–0.5)
LDLC SERPL CALC-MCNC: 16.8 MG/DL (ref 63–159)
LYMPHOCYTES # BLD AUTO: 1.7 K/UL (ref 1–4.8)
LYMPHOCYTES NFR BLD: 26.3 % (ref 18–48)
MCH RBC QN AUTO: 29.9 PG (ref 27–31)
MCHC RBC AUTO-ENTMCNC: 33.9 G/DL (ref 32–36)
MCV RBC AUTO: 88 FL (ref 82–98)
MICROALBUMIN UR DL<=1MG/L-MCNC: 7 UG/ML
MONOCYTES # BLD AUTO: 0.5 K/UL (ref 0.3–1)
MONOCYTES NFR BLD: 7.8 % (ref 4–15)
NEUTROPHILS # BLD AUTO: 3.8 K/UL (ref 1.8–7.7)
NEUTROPHILS NFR BLD: 57.8 % (ref 38–73)
NONHDLC SERPL-MCNC: 60 MG/DL
NRBC BLD-RTO: 0 /100 WBC
PLATELET # BLD AUTO: 250 K/UL (ref 150–450)
PMV BLD AUTO: 10.7 FL (ref 9.2–12.9)
POTASSIUM SERPL-SCNC: 4.1 MMOL/L (ref 3.5–5.1)
PROT SERPL-MCNC: 6.6 G/DL (ref 6–8.4)
RBC # BLD AUTO: 4.91 M/UL (ref 4.6–6.2)
SODIUM SERPL-SCNC: 135 MMOL/L (ref 136–145)
TRIGL SERPL-MCNC: 216 MG/DL (ref 30–150)
WBC # BLD AUTO: 6.54 K/UL (ref 3.9–12.7)

## 2024-09-05 PROCEDURE — 85025 COMPLETE CBC W/AUTO DIFF WBC: CPT | Performed by: INTERNAL MEDICINE

## 2024-09-05 PROCEDURE — 82570 ASSAY OF URINE CREATININE: CPT | Performed by: INTERNAL MEDICINE

## 2024-09-05 PROCEDURE — 84153 ASSAY OF PSA TOTAL: CPT | Performed by: INTERNAL MEDICINE

## 2024-09-05 PROCEDURE — 80061 LIPID PANEL: CPT | Performed by: INTERNAL MEDICINE

## 2024-09-05 PROCEDURE — 83036 HEMOGLOBIN GLYCOSYLATED A1C: CPT | Performed by: INTERNAL MEDICINE

## 2024-09-05 PROCEDURE — 80053 COMPREHEN METABOLIC PANEL: CPT | Performed by: NURSE PRACTITIONER

## 2024-09-05 NOTE — PROGRESS NOTES
Confirmed name and , venipuncture performed  x's 2 attempt , L antecubital. Labs drawn per orders, pressure held X 1 min, Coban dressing applied. Advised pt in aftercare instructions.

## 2024-09-06 DIAGNOSIS — E11.8 TYPE 2 DIABETES MELLITUS WITH COMPLICATION, WITHOUT LONG-TERM CURRENT USE OF INSULIN: ICD-10-CM

## 2024-09-06 RX ORDER — GLIMEPIRIDE 4 MG/1
4 TABLET ORAL 2 TIMES DAILY WITH MEALS
Qty: 180 TABLET | Refills: 3 | Status: SHIPPED | OUTPATIENT
Start: 2024-09-06

## 2024-09-27 ENCOUNTER — OFFICE VISIT (OUTPATIENT)
Dept: FAMILY MEDICINE | Facility: CLINIC | Age: 64
End: 2024-09-27
Payer: MEDICARE

## 2024-09-27 VITALS
OXYGEN SATURATION: 97 % | WEIGHT: 272.5 LBS | SYSTOLIC BLOOD PRESSURE: 134 MMHG | BODY MASS INDEX: 40.36 KG/M2 | HEIGHT: 69 IN | HEART RATE: 75 BPM | DIASTOLIC BLOOD PRESSURE: 78 MMHG

## 2024-09-27 DIAGNOSIS — G47.33 OBSTRUCTIVE SLEEP APNEA: ICD-10-CM

## 2024-09-27 DIAGNOSIS — I25.118 CORONARY ARTERY DISEASE INVOLVING NATIVE CORONARY ARTERY OF NATIVE HEART WITH OTHER FORM OF ANGINA PECTORIS: ICD-10-CM

## 2024-09-27 DIAGNOSIS — Z79.4 TYPE 2 DIABETES MELLITUS WITH OTHER SPECIFIED COMPLICATION, WITH LONG-TERM CURRENT USE OF INSULIN: ICD-10-CM

## 2024-09-27 DIAGNOSIS — I10 ESSENTIAL HYPERTENSION: ICD-10-CM

## 2024-09-27 DIAGNOSIS — Z23 NEED FOR INFLUENZA VACCINATION: Primary | ICD-10-CM

## 2024-09-27 DIAGNOSIS — E78.2 MIXED HYPERLIPIDEMIA: ICD-10-CM

## 2024-09-27 DIAGNOSIS — R06.09 CHRONIC DYSPNEA: ICD-10-CM

## 2024-09-27 DIAGNOSIS — K21.9 GASTROESOPHAGEAL REFLUX DISEASE WITHOUT ESOPHAGITIS: ICD-10-CM

## 2024-09-27 DIAGNOSIS — E11.69 TYPE 2 DIABETES MELLITUS WITH OTHER SPECIFIED COMPLICATION, WITH LONG-TERM CURRENT USE OF INSULIN: ICD-10-CM

## 2024-09-27 PROCEDURE — 99999 PR PBB SHADOW E&M-EST. PATIENT-LVL IV: CPT | Mod: PBBFAC,,, | Performed by: INTERNAL MEDICINE

## 2024-09-27 NOTE — PROGRESS NOTES
Patient ID: Rashel Munson III is a 64 y.o. male.    Chief Complaint: Follow-up     Assessment and Plan   1. Need for influenza vaccination  - influenza (Flulaval, Fluzone, Fluarix) 45 mcg/0.5 mL IM vaccine (> or = 6 mo) 0.5 mL    2. Type 2 diabetes mellitus with other specified complication, with long-term current use of insulin  - Hemoglobin A1C; Future    3. Chronic dyspnea    4. Coronary artery disease involving native coronary artery of native heart with other form of angina pectoris    5. Essential hypertension    6. Gastroesophageal reflux disease without esophagitis    7. Mixed hyperlipidemia    8. Obstructive sleep apnea     Continue medications listed below at dosages listed in the comprehensive med list found further down in the note.  amLODIPine  aspirin  fenofibrate  glimepiride  hydrALAZINE  metFORMIN  MOUNJARO Pnij  nebivoloL Tab  nitroGLYCERIN  pantoprazole  ranolazine Tb12  rosuvastatin  spironolactone  valsartan      HPI     Six-month follow-up    Coronary artery disease- stable, following with Dr. Ogden.  Taking nebivolol, ranolazine, aspirin, and Crestor    Hyperlipidemia-controlled, taking rosuvastatin and fenofibrate    Hypertension- controlled on amlodipine, spironolactone, valsartan, and hydralazine    Type 2 diabetes- controlled.  Toujeo 58 units, glimepiride 4 mg, metformin, and mounjaro 12.5 mg    GERD- stable on Protonix    KATELYN- wearing cpap     Right hand tremor.- seems to be resting    Disability paperwork filled out    Review of Systems   Constitutional:  Negative for fever.   Respiratory:  Negative for shortness of breath.    Cardiovascular:  Negative for chest pain.   Gastrointestinal:  Negative for abdominal pain.       I personally reviewed past medical, family and social history.   Objective    Vitals:    09/27/24 1033   BP: 134/78   Pulse: 75      Wt Readings from Last 3 Encounters:   09/27/24 1033 123.6 kg (272 lb 7.8 oz)   04/02/24 1054 123.5 kg (272 lb 6.1 oz)   03/08/24  1055 125.4 kg (276 lb 6.4 oz)      Body mass index is 40.24 kg/m².     Physical Exam  Cardiovascular:      Rate and Rhythm: Normal rate and regular rhythm.      Heart sounds: No murmur heard.     No gallop.   Pulmonary:      Breath sounds: Normal breath sounds. No wheezing or rhonchi.   Abdominal:      Palpations: Abdomen is soft.      Tenderness: There is no abdominal tenderness.        Reference     : Visit today included increased complexity associated with the care of the episodic problem type 2 diabetes addressed and managing the longitudinal care of the patient due to the serious and/or complex managed problem(s)     Active Problem List with Overview Notes    Diagnosis Date Noted    Thoracic aorta atherosclerosis 09/21/2022     Seen on CTA chest noncoronary 03/12/2020      Obstructive sleep apnea 10/17/2019    Coronary artery disease involving native coronary artery of native heart with other form of angina pectoris 09/16/2019     Five stents  Stable, continue aspirin, beta-blocker, statin  Continue follow-up with Cardiology.      Chronic dyspnea 08/19/2019     Seeing Dr. Ogden and metoprolol discontinued and he was put on ranexa and bistolic. Dyspnea has improved.       Mixed hyperlipidemia 08/19/2019     LDL at goal but triglycerides elevated.  Continue fenofibrate and Crestor      Gastroesophageal reflux disease without esophagitis 08/15/2017    Essential hypertension 08/09/2017     Controlled, continue regimen listed.      Morbid obesity with BMI of 40.0-44.9, adult 08/09/2017    Type 2 diabetes mellitus with other specified complication, with long-term current use of insulin 08/09/2017     Controlled.  Following with endo.  Has virtual visit today.  May discontinue Jardiance to help with cost of medications.        Tremor of right hand 04/02/2024     Onset 2023. Family history of parkinson disease. hand tremor occurs when he does not feel well. Resting and with activity.       History of deep vein  "thrombosis (DVT) of lower extremity 10/17/2019    Stented coronary artery 10/07/2019    Long term (current) use of antithrombotics/antiplatelets 09/16/2019    Tachycardia 08/09/2017    Venous insufficiency 08/09/2017        Diabetes Medications               glimepiride (AMARYL) 4 MG tablet TAKE 1 TABLET TWICE A DAY WITH MEALS    insulin glargine U-300 conc (TOUJEO MAX U-300 SOLOSTAR) 300 unit/mL (3 mL) insulin pen Inject 58 Units into the skin every evening.    metFORMIN (GLUCOPHAGE) 1000 MG tablet TAKE 1 TABLET TWICE A DAY WITH MEALS    tirzepatide (MOUNJARO) 12.5 mg/0.5 mL PnIj Inject 12.5 mg into the skin every 7 days.           Hypertension Medications               amLODIPine (NORVASC) 10 MG tablet TAKE 1 TABLET DAILY    hydrALAZINE (APRESOLINE) 100 MG tablet TAKE 1 TABLET TWICE A DAY    nebivoloL (BYSTOLIC) 10 MG Tab TAKE 1 TABLET DAILY    nitroGLYCERIN (NITROSTAT) 0.4 MG SL tablet Place 1 tablet (0.4 mg total) under the tongue every 5 (five) minutes as needed.    spironolactone (ALDACTONE) 25 MG tablet Take 1 tablet (25 mg total) by mouth once daily.    valsartan (DIOVAN) 320 MG tablet TAKE 1 TABLET DAILY           Hyperlipidemia Medications               fenofibrate (TRICOR) 145 MG tablet TAKE 1 TABLET DAILY    rosuvastatin (CRESTOR) 5 MG tablet Take 5 mg by mouth once daily.           Medication List with Changes/Refills   Current Medications    AMLODIPINE (NORVASC) 10 MG TABLET    TAKE 1 TABLET DAILY    AMOXICILLIN (AMOXIL) 500 MG TAB        ASPIRIN (ECOTRIN) 81 MG EC TABLET    Take 81 mg by mouth every evening.    BD ULTRA-FINE MICRO PEN NEEDLE 32 GAUGE X 1/4" NDLE    USE 1 NEEDLE DAILY WITH INSULIN    FENOFIBRATE (TRICOR) 145 MG TABLET    TAKE 1 TABLET DAILY    GLIMEPIRIDE (AMARYL) 4 MG TABLET    TAKE 1 TABLET TWICE A DAY WITH MEALS    HYDRALAZINE (APRESOLINE) 100 MG TABLET    TAKE 1 TABLET TWICE A DAY    INSULIN GLARGINE U-300 CONC (TOUJEO MAX U-300 SOLOSTAR) 300 UNIT/ML (3 ML) INSULIN PEN    Inject 58 " Units into the skin every evening.    METFORMIN (GLUCOPHAGE) 1000 MG TABLET    TAKE 1 TABLET TWICE A DAY WITH MEALS    NEBIVOLOL (BYSTOLIC) 10 MG TAB    TAKE 1 TABLET DAILY    NITROGLYCERIN (NITROSTAT) 0.4 MG SL TABLET    Place 1 tablet (0.4 mg total) under the tongue every 5 (five) minutes as needed.    PANTOPRAZOLE (PROTONIX) 40 MG TABLET    Take 40 mg by mouth 2 (two) times daily.    RANOLAZINE (RANEXA) 500 MG TB12    TAKE 1 TABLET TWICE A DAY    ROSUVASTATIN (CRESTOR) 5 MG TABLET    Take 5 mg by mouth once daily.    SPIRONOLACTONE (ALDACTONE) 25 MG TABLET    Take 1 tablet (25 mg total) by mouth once daily.    TIRZEPATIDE (MOUNJARO) 12.5 MG/0.5 ML PNIJ    Inject 12.5 mg into the skin every 7 days.    VALSARTAN (DIOVAN) 320 MG TABLET    TAKE 1 TABLET DAILY         Sodium   Date Value Ref Range Status   09/05/2024 135 (L) 136 - 145 mmol/L Final     Potassium   Date Value Ref Range Status   09/05/2024 4.1 3.5 - 5.1 mmol/L Final     Chloride   Date Value Ref Range Status   09/05/2024 104 95 - 110 mmol/L Final     CO2   Date Value Ref Range Status   09/05/2024 22 (L) 23 - 29 mmol/L Final     Glucose   Date Value Ref Range Status   09/05/2024 174 (H) 70 - 110 mg/dL Final     BUN   Date Value Ref Range Status   09/05/2024 16 8 - 23 mg/dL Final     Creatinine   Date Value Ref Range Status   09/05/2024 1.1 0.5 - 1.4 mg/dL Final     Calcium   Date Value Ref Range Status   09/05/2024 9.3 8.7 - 10.5 mg/dL Final     Total Protein   Date Value Ref Range Status   09/05/2024 6.6 6.0 - 8.4 g/dL Final     Albumin   Date Value Ref Range Status   09/05/2024 3.9 3.5 - 5.2 g/dL Final     Total Bilirubin   Date Value Ref Range Status   09/05/2024 0.5 0.1 - 1.0 mg/dL Final     Comment:     For infants and newborns, interpretation of results should be based  on gestational age, weight and in agreement with clinical  observations.    Premature Infant recommended reference ranges:  Up to 24 hours.............<8.0 mg/dL  Up to 48  hours............<12.0 mg/dL  3-5 days..................<15.0 mg/dL  6-29 days.................<15.0 mg/dL       Alkaline Phosphatase   Date Value Ref Range Status   09/05/2024 52 (L) 55 - 135 U/L Final     AST   Date Value Ref Range Status   09/05/2024 27 10 - 40 U/L Final     ALT   Date Value Ref Range Status   09/05/2024 28 10 - 44 U/L Final     Anion Gap   Date Value Ref Range Status   09/05/2024 9 8 - 16 mmol/L Final     eGFR   Date Value Ref Range Status   09/05/2024 >60.0 >60 mL/min/1.73 m^2 Final      Lab Results   Component Value Date    HGBA1C 6.4 (H) 09/05/2024    HGBA1C 6.4 (H) 03/21/2024    HGBA1C 6.5 (H) 11/21/2023      Lab Results   Component Value Date    TSH 1.620 09/15/2022      Lab Results   Component Value Date    CHOL 86 (L) 09/05/2024    CHOL 107 (L) 07/27/2023    CHOL 98 (L) 06/16/2022     Lab Results   Component Value Date    HDL 26 (L) 09/05/2024    HDL 23 (L) 07/27/2023    HDL 26 (L) 06/16/2022     Lab Results   Component Value Date    LDLCALC 16.8 (L) 09/05/2024    LDLCALC 23.4 (L) 07/27/2023    LDLCALC 10.0 (L) 06/16/2022     Lab Results   Component Value Date    TRIG 216 (H) 09/05/2024    TRIG 303 (H) 07/27/2023    TRIG 310 (H) 06/16/2022     Lab Results   Component Value Date    CHOLHDL 30.2 09/05/2024    CHOLHDL 21.5 07/27/2023    CHOLHDL 26.5 06/16/2022       Results for orders placed during the hospital encounter of 04/24/23    Echo    Interpretation Summary  · Concentric remodeling and normal systolic function.  · Mild left atrial enlargement.  · The estimated ejection fraction is 60%.  · Indeterminate left ventricular diastolic function.  · Normal right ventricular size with normal right ventricular systolic function.  · Intermediate central venous pressure (8 mmHg).  · The estimated PA systolic pressure is 32 mmHg.     Results for orders placed during the hospital encounter of 04/17/23    Exercise Stress - EKG    Interpretation Summary    The patient exercised for 6 minutes 0  seconds on a Leonard protocol, corresponding to a functional capacity of 7 METS, achieving a peak heart rate of 156 bpm, which is 99 % of the age predicted maximum heart rate. The patient experienced no angina during the test.    The ECG portion of the study is positive for ischemia, horizontal ST depressions at peak exercise in the inferior lateral leads, 1 mm    The patient reported no chest pain during the stress test.    During stress, frequent PVCs are noted.    Medium risk stress test by Torres treadmill score

## 2024-10-01 ENCOUNTER — PATIENT MESSAGE (OUTPATIENT)
Dept: PHARMACY | Facility: CLINIC | Age: 64
End: 2024-10-01
Payer: MEDICARE

## 2024-10-01 ENCOUNTER — TELEPHONE (OUTPATIENT)
Dept: ENDOCRINOLOGY | Facility: CLINIC | Age: 64
End: 2024-10-01

## 2024-10-01 ENCOUNTER — TELEPHONE (OUTPATIENT)
Dept: PHARMACY | Facility: CLINIC | Age: 64
End: 2024-10-01
Payer: MEDICARE

## 2024-10-01 ENCOUNTER — OFFICE VISIT (OUTPATIENT)
Dept: ENDOCRINOLOGY | Facility: CLINIC | Age: 64
End: 2024-10-01
Payer: MEDICARE

## 2024-10-01 DIAGNOSIS — E11.69 TYPE 2 DIABETES MELLITUS WITH OTHER SPECIFIED COMPLICATION, WITH LONG-TERM CURRENT USE OF INSULIN: Primary | ICD-10-CM

## 2024-10-01 DIAGNOSIS — E66.01 MORBID OBESITY WITH BMI OF 40.0-44.9, ADULT: ICD-10-CM

## 2024-10-01 DIAGNOSIS — I10 ESSENTIAL HYPERTENSION: ICD-10-CM

## 2024-10-01 DIAGNOSIS — E78.00 HYPERCHOLESTEROLEMIA: ICD-10-CM

## 2024-10-01 DIAGNOSIS — Z79.4 TYPE 2 DIABETES MELLITUS WITH OTHER SPECIFIED COMPLICATION, WITH LONG-TERM CURRENT USE OF INSULIN: Primary | ICD-10-CM

## 2024-10-01 DIAGNOSIS — I25.10 CORONARY ARTERY DISEASE INVOLVING NATIVE CORONARY ARTERY OF NATIVE HEART WITHOUT ANGINA PECTORIS: ICD-10-CM

## 2024-10-01 DIAGNOSIS — E11.8 TYPE 2 DIABETES MELLITUS WITH COMPLICATION, WITHOUT LONG-TERM CURRENT USE OF INSULIN: ICD-10-CM

## 2024-10-01 PROCEDURE — 99214 OFFICE O/P EST MOD 30 MIN: CPT | Mod: 95,,, | Performed by: NURSE PRACTITIONER

## 2024-10-01 PROCEDURE — 1160F RVW MEDS BY RX/DR IN RCRD: CPT | Mod: CPTII,95,, | Performed by: NURSE PRACTITIONER

## 2024-10-01 PROCEDURE — 3072F LOW RISK FOR RETINOPATHY: CPT | Mod: CPTII,95,, | Performed by: NURSE PRACTITIONER

## 2024-10-01 PROCEDURE — 4010F ACE/ARB THERAPY RXD/TAKEN: CPT | Mod: CPTII,95,, | Performed by: NURSE PRACTITIONER

## 2024-10-01 PROCEDURE — 3044F HG A1C LEVEL LT 7.0%: CPT | Mod: CPTII,95,, | Performed by: NURSE PRACTITIONER

## 2024-10-01 PROCEDURE — G2211 COMPLEX E/M VISIT ADD ON: HCPCS | Mod: 95,,, | Performed by: NURSE PRACTITIONER

## 2024-10-01 PROCEDURE — 3066F NEPHROPATHY DOC TX: CPT | Mod: CPTII,95,, | Performed by: NURSE PRACTITIONER

## 2024-10-01 PROCEDURE — 3061F NEG MICROALBUMINURIA REV: CPT | Mod: CPTII,95,, | Performed by: NURSE PRACTITIONER

## 2024-10-01 PROCEDURE — 1159F MED LIST DOCD IN RCRD: CPT | Mod: CPTII,95,, | Performed by: NURSE PRACTITIONER

## 2024-10-01 RX ORDER — BLOOD SUGAR DIAGNOSTIC
STRIP MISCELLANEOUS
Qty: 100 EACH | Refills: 3 | Status: SHIPPED | OUTPATIENT
Start: 2024-10-01

## 2024-10-01 RX ORDER — INSULIN GLARGINE 300 U/ML
58 INJECTION, SOLUTION SUBCUTANEOUS NIGHTLY
Qty: 18 ML | Refills: 3 | Status: SHIPPED | OUTPATIENT
Start: 2024-10-01 | End: 2025-10-01

## 2024-10-01 RX ORDER — INSULIN DEGLUDEC 200 U/ML
58 INJECTION, SOLUTION SUBCUTANEOUS NIGHTLY
Start: 2024-10-01 | End: 2025-10-01

## 2024-10-01 RX ORDER — INSULIN GLARGINE 300 U/ML
30 INJECTION, SOLUTION SUBCUTANEOUS NIGHTLY
Qty: 9 ML | Refills: 3 | Status: SHIPPED | OUTPATIENT
Start: 2024-10-01 | End: 2024-10-01

## 2024-10-01 RX ORDER — SEMAGLUTIDE 1.34 MG/ML
1 INJECTION, SOLUTION SUBCUTANEOUS
Start: 2024-10-01 | End: 2025-10-01

## 2024-10-01 NOTE — TELEPHONE ENCOUNTER
Please contact pt regarding PAP for Ozempic 1 mg weekly, Tresiba U200, and pen needles. Thank you!

## 2024-10-01 NOTE — PROGRESS NOTES
The patient location is:  [place]   The chief complaint leading to consultation is: Type 2 DM   Visit type: Virtual visit with synchronous audio and video  Total time spent with patient: 20 min  Each patient to whom he or she provides medical services by telemedicine is:  (1) informed of the relationship between the physician and patient and the respective role of any other health care provider with respect to management of the patient; and (2) notified that he or she may decline to receive medical services by telemedicine and may withdraw from such care at any time.       CC: Mr. Rashel Munson III arrives today for management of Type 2 DM and review of chronic medical conditions, as listed in the Visit Diagnosis section of this encounter.       HPI: Mr. Rashel Munson III was diagnosed with Type 2 DM in ~2010. He was diagnosed based on lab work. Initial treatment consisted of metformin. + FH of DM in maternal GM and uncles. Denies hospitalizations due to DM.   He has CAD and recently with 5 stent placements. Follows with Dr. Ogden.     He participates in the digital diabetes program.     Patient was last seen by me in April. Mounjaro dose was increased at that time.     He stopped taking Toujeo Max about 2 months ago, due to reported good control. Also stopped the Jardiance, due to cost.     Today, he reports that he is in the donut hole and can no longer afford Mounjaro.     BG readings are checked 1x/day          Hypoglycemia: No    Missing Insulin/PO medication doses: Rare.    Exercise: No formal     Dietary Habits: Eats 3 meals/day.  Rare snacking. Avoids sugary beverages.     Last DM education appointment: 9/2020        CURRENT DIABETIC MEDS: metformin 1000 mg BID, glimepiride 4 mg BID, Mounjaro 12.5 mg weekly  Vial or pen: n/a  Glucometer type: Darlyn    Previous DM treatments:  Glyburide   Ozempic - expensive  Tresiba   Jardiance   Toujeo Max (dose was 58 units)    Last Eye Exam: 11/2023, no     Last Podiatry Exam: n/a    REVIEW OF SYSTEMS  Constitutional: no c/o fatigue, weakness, weight loss.   Cardiac: no palpitations or chest pain.  Respiratory: no cough. Reports stable, chronic dyspnea   GI: no c/o abdominal pain or nausea. Denies h/o pancreatitis.    Skin: no lesions or rashes.  Neuro: no c/o numbness, tingling, paresthesias.   Endocrine: denies polyphagia, polydipsia, polyuria. Denies personal or family h/o MTC, MEN2.      Personally reviewed Past Medical, Surgical, Social History.    Vital Signs  There were no vitals taken for this visit. -- video visit     Personally reviewed the below labs:    Hemoglobin A1C   Date Value Ref Range Status   09/05/2024 6.4 (H) 4.0 - 5.6 % Final     Comment:     ADA Screening Guidelines:  5.7-6.4%  Consistent with prediabetes  >or=6.5%  Consistent with diabetes    High levels of fetal hemoglobin interfere with the HbA1C  assay. Heterozygous hemoglobin variants (HbS, HgC, etc)do  not significantly interfere with this assay.   However, presence of multiple variants may affect accuracy.     03/21/2024 6.4 (H) 4.0 - 5.6 % Final     Comment:     ADA Screening Guidelines:  5.7-6.4%  Consistent with prediabetes  >or=6.5%  Consistent with diabetes    High levels of fetal hemoglobin interfere with the HbA1C  assay. Heterozygous hemoglobin variants (HbS, HgC, etc)do  not significantly interfere with this assay.   However, presence of multiple variants may affect accuracy.     11/21/2023 6.5 (H) 4.0 - 5.6 % Final     Comment:     ADA Screening Guidelines:  5.7-6.4%  Consistent with prediabetes  >or=6.5%  Consistent with diabetes    High levels of fetal hemoglobin interfere with the HbA1C  assay. Heterozygous hemoglobin variants (HbS, HgC, etc)do  not significantly interfere with this assay.   However, presence of multiple variants may affect accuracy.         Chemistry        Component Value Date/Time     (L) 09/05/2024 0800    K 4.1 09/05/2024 0800     09/05/2024  0800    CO2 22 (L) 09/05/2024 0800    BUN 16 09/05/2024 0800    CREATININE 1.1 09/05/2024 0800     (H) 09/05/2024 0800        Component Value Date/Time    CALCIUM 9.3 09/05/2024 0800    ALKPHOS 52 (L) 09/05/2024 0800    AST 27 09/05/2024 0800    ALT 28 09/05/2024 0800    BILITOT 0.5 09/05/2024 0800    ESTGFRAFRICA >60.0 06/16/2022 0923    EGFRNONAA >60.0 06/16/2022 0923          Lab Results   Component Value Date    CHOL 86 (L) 09/05/2024    CHOL 107 (L) 07/27/2023    CHOL 98 (L) 06/16/2022     Lab Results   Component Value Date    HDL 26 (L) 09/05/2024    HDL 23 (L) 07/27/2023    HDL 26 (L) 06/16/2022     Lab Results   Component Value Date    LDLCALC 16.8 (L) 09/05/2024    LDLCALC 23.4 (L) 07/27/2023    LDLCALC 10.0 (L) 06/16/2022     Lab Results   Component Value Date    TRIG 216 (H) 09/05/2024    TRIG 303 (H) 07/27/2023    TRIG 310 (H) 06/16/2022     Lab Results   Component Value Date    CHOLHDL 30.2 09/05/2024    CHOLHDL 21.5 07/27/2023    CHOLHDL 26.5 06/16/2022       Lab Results   Component Value Date    MICALBCREAT 4.9 09/05/2024     Lab Results   Component Value Date    TSH 1.620 09/15/2022       CrCl cannot be calculated (Patient's most recent lab result is older than the maximum 7 days allowed.).    Vit D, 25-Hydroxy   Date Value Ref Range Status   09/15/2022 28 (L) 30 - 96 ng/mL Final     Comment:     Vitamin D deficiency.........<10 ng/mL                              Vitamin D insufficiency......10-29 ng/mL       Vitamin D sufficiency........> or equal to 30 ng/mL  Vitamin D toxicity............>100 ng/mL           PHYSICAL EXAMINATION  Deferred - video visit        Goals         80 <= Glucose <= 180 (pt-stated)       Blood Pressure < 130/80       Exercise at least 150 minutes per week.       HEMOGLOBIN A1C < 7       Take at least one BP reading per week at various times of the day                 Assessment/Plan  1. Type 2 diabetes mellitus with complication, with long-term current use of insulin   -- A1c stable. However, glucoses are on the rise since stopping Toujeo Max. Unable to continue on Mounjaro, due to cost. Will resume Toujeo Max.  -- will contact Patient Assistance rep regarding PAP for Ozempic 1 mg and Tresiba U200 (to replace Mounjaro and Toujeo Max).  -- resume Toujeo Max at 30 units. Anticipate increasing back up to his prior dose of 58 units nightly.   -- continue glimepiride, metformin  -- check BG 2x/day. Notify me if fasting blood sugars remain > 150.   -- takes ARB,statin, aspirin   2. Coronary artery disease involving native coronary artery of native heart without angina pectoris  -- optimize DM control.  -- follows with cardiology   3. Hypertension  -- continue current meds.   -- Managed by cardiology   4. Hypercholesterolemia  -- uncontrolled with elevated TRG but this is trending down.  -- optimize DM control  -- taking Crestor, fenofibrate   -- follows with cardiology   5. Morbid obesity with BMI of 40.0-44.9, adult  -- increasing insulin resistance.  There is no height or weight on file to calculate BMI.        FOLLOW UP  Follow up in about 3 months (around 1/1/2025).  Patient instructed to bring BG logs to each follow up   Patient encouraged to call for any BG/medication issues, concerns, or questions.    Orders Placed This Encounter   Procedures    TSH

## 2024-10-01 NOTE — TELEPHONE ENCOUNTER
Rashel Munson III has been informed of the [employer] application process for Ozempic 1mg, Pen Needles, and Tresiba Pen and what's required to apply.  He will provide the following documents: Proof of household Income( such as social security statement, 1099 form, pension statement or 3 consecutive pay stubs, Copy of all Insurance cards( front and back), and Completed [facility] Authorization Forms        Follow-up will be made in 5 business days.     Ninfa Cottrell  Pharmacy Patient Assistance Team

## 2024-10-02 ENCOUNTER — PATIENT MESSAGE (OUTPATIENT)
Dept: FAMILY MEDICINE | Facility: CLINIC | Age: 64
End: 2024-10-02
Payer: MEDICARE

## 2024-10-07 ENCOUNTER — PATIENT MESSAGE (OUTPATIENT)
Dept: ENDOCRINOLOGY | Facility: CLINIC | Age: 64
End: 2024-10-07
Payer: MEDICARE

## 2024-12-24 DIAGNOSIS — I10 ESSENTIAL HYPERTENSION: ICD-10-CM

## 2024-12-24 RX ORDER — AMLODIPINE BESYLATE 10 MG/1
10 TABLET ORAL
Qty: 90 TABLET | Refills: 3 | Status: ON HOLD | OUTPATIENT
Start: 2024-12-24

## 2024-12-24 NOTE — TELEPHONE ENCOUNTER
No care due was identified.  [facility] Embedded Care Due Messages. Reference number: [id number].   12/24/2024 3:27:09 PM CST

## 2024-12-25 NOTE — TELEPHONE ENCOUNTER
Refill Decision Note   Rashel Munson  is requesting a refill authorization.  Brief Assessment and Rationale for Refill:  Approve     Medication Therapy Plan:       Medication Reconciliation Completed: No   Comments:     No Care Gaps recommended.     Note composed:6:11 PM 12/24/2024

## 2024-12-28 PROBLEM — E83.42 HYPOMAGNESEMIA: Status: ACTIVE | Noted: 2024-12-28

## 2024-12-28 PROBLEM — R07.89 OTHER CHEST PAIN: Status: ACTIVE | Noted: 2024-12-28

## 2024-12-30 PROBLEM — I25.9 CHEST PAIN DUE TO MYOCARDIAL ISCHEMIA: Status: ACTIVE | Noted: 2024-12-28

## 2024-12-31 PROBLEM — I20.0 UNSTABLE ANGINA: Status: ACTIVE | Noted: 2024-12-31

## 2025-01-07 ENCOUNTER — PATIENT MESSAGE (OUTPATIENT)
Dept: FAMILY MEDICINE | Facility: CLINIC | Age: 65
End: 2025-01-07

## 2025-01-07 ENCOUNTER — OFFICE VISIT (OUTPATIENT)
Dept: FAMILY MEDICINE | Facility: CLINIC | Age: 65
End: 2025-01-07
Payer: MEDICARE

## 2025-01-07 VITALS
OXYGEN SATURATION: 96 % | SYSTOLIC BLOOD PRESSURE: 124 MMHG | DIASTOLIC BLOOD PRESSURE: 66 MMHG | HEIGHT: 69 IN | WEIGHT: 278.88 LBS | HEART RATE: 76 BPM | BODY MASS INDEX: 41.31 KG/M2

## 2025-01-07 DIAGNOSIS — Z79.4 TYPE 2 DIABETES MELLITUS WITH OTHER SPECIFIED COMPLICATION, WITH LONG-TERM CURRENT USE OF INSULIN: ICD-10-CM

## 2025-01-07 DIAGNOSIS — E66.01 SEVERE OBESITY (BMI >= 40): ICD-10-CM

## 2025-01-07 DIAGNOSIS — E11.69 TYPE 2 DIABETES MELLITUS WITH OTHER SPECIFIED COMPLICATION, WITH LONG-TERM CURRENT USE OF INSULIN: ICD-10-CM

## 2025-01-07 DIAGNOSIS — Z09 HOSPITAL DISCHARGE FOLLOW-UP: Primary | ICD-10-CM

## 2025-01-07 PROCEDURE — 99999 PR PBB SHADOW E&M-EST. PATIENT-LVL IV: CPT | Mod: PBBFAC,,, | Performed by: INTERNAL MEDICINE

## 2025-01-07 PROCEDURE — 3008F BODY MASS INDEX DOCD: CPT | Mod: CPTII,S$GLB,, | Performed by: INTERNAL MEDICINE

## 2025-01-07 PROCEDURE — 1159F MED LIST DOCD IN RCRD: CPT | Mod: CPTII,S$GLB,, | Performed by: INTERNAL MEDICINE

## 2025-01-07 PROCEDURE — 3074F SYST BP LT 130 MM HG: CPT | Mod: CPTII,S$GLB,, | Performed by: INTERNAL MEDICINE

## 2025-01-07 PROCEDURE — 3078F DIAST BP <80 MM HG: CPT | Mod: CPTII,S$GLB,, | Performed by: INTERNAL MEDICINE

## 2025-01-07 PROCEDURE — 99214 OFFICE O/P EST MOD 30 MIN: CPT | Mod: S$GLB,,, | Performed by: INTERNAL MEDICINE

## 2025-01-07 PROCEDURE — G2211 COMPLEX E/M VISIT ADD ON: HCPCS | Mod: S$GLB,,, | Performed by: INTERNAL MEDICINE

## 2025-01-07 PROCEDURE — 1160F RVW MEDS BY RX/DR IN RCRD: CPT | Mod: CPTII,S$GLB,, | Performed by: INTERNAL MEDICINE

## 2025-01-07 RX ORDER — TIRZEPATIDE 2.5 MG/.5ML
2.5 INJECTION, SOLUTION SUBCUTANEOUS
Qty: 4 PEN | Refills: 0 | Status: SHIPPED | OUTPATIENT
Start: 2025-01-07

## 2025-01-07 NOTE — PROGRESS NOTES
Patient ID: Rashel Munson III is a 64 y.o. male.    Chief Complaint: Hospital Follow Up     Assessment and Plan     1. Type 2 diabetes mellitus with other specified complication, with long-term current use of insulin  - tirzepatide (MOUNJARO) 2.5 mg/0.5 mL PnIj; Inject 2.5 mg into the skin every 7 days.  Dispense: 4 Pen; Refill: 0    2. Severe obesity (BMI >= 40)    3. Hospital discharge follow-up       Assessment & Plan    ANGINA:  - Continued ranolazine 500 mg for angina management.  - Continued Bisoprolol 10 mg for angina control.  - Observed patient experiencing chest pain and discomfort, especially when supine.    CORONARY ARTERY DISEASE:  - Maintained Plavix for recent stent placement and aspirin 81 mg for antiplatelet therapy.  - Noted patient's recent hospitalization for chest pain and stent placement.  echocardiogram showing EF of 60-65% with mild aortic valve sclerosis.  - Conducted physical exam revealing normal heart sounds without murmurs, rubs, or gallops.  - Continued rosuvastatin (Crestor) 5 mg for coronary artery disease management.  - Confirmed patient has not undergone bypass surgery, only stent placement.    HYPERTENSION:  - Continued amlodipine 10 mg, spironolactone 25 mg, valsartan 320 mg, and hydralazine 100 mg twice daily for hypertension management.  - Observed improved blood pressure at today's visit, specific values not recorded.    DIABETES:  - Educated patient on the importance of regular ophthalmological exams for diabetic patients.  - Instructed patient to schedule eye exam for diabetic retinopathy screening.  - Initiated Mounjaro 2.5 mg for diabetes management.  - Continued glimepiride (Amaryl) 4 mg twice daily, metformin, and Toujeo 58 units for diabetes control.  - Discontinued Ozempic.  - Ordered Hemoglobin A1C test for 6 months from now.  - Instructed patient to contact office if unable to obtain Mounjaro or if any issues arise.  - Noted patient's elevated glucose levels,  with recent readings of 212 and 239.  - Reviewed last A1C from September, which was 6.4.  - Observed slightly elevated glucose in recent basic metabolic panel.  - Planned to reassess A1C in 4 months.    HYPERLIPIDEMIA:  - Continued fenofibrate 145 mg and rosuvastatin (Crestor) 5 mg for hyperlipidemia management.  - Noted recent lipid panel showing low LDL but elevated triglycerides.    AORTIC VALVE SCLEROSIS:  - Observed mild aortic valve sclerosis on echocardiogram.    EDEMA:  - Noted mild pitting edema (approximately 1+) on bilateral lower extremities during physical exam.    RSV VACCINATION:  - Recommend RSV and pneumonia vaccines.  - Advised patient to discuss vaccination with cardiologist and consider administration at a local pharmacy.  - Discussed RSV vaccine and its potential benefits for patients over 60 with risk factors.    FOLLOW UP:  - Scheduled follow-up visit in 6 months.  - Follow up in 6 months.         No follow-ups on file.   HPI     History of Present Illness    CHIEF COMPLAINT:  Rashel presents for a follow-up visit after a recent hospitalization for chest pain and stent placement in the right coronary artery.    HPI:  Rashel experienced chest pain approximately 1 week ago, leading to hospitalization. The pain was similar to breathing in cold air, with discomfort when lying down. Symptoms began on Jose Alfredo night and were intermittent over several days. On the night of hospitalization, the patient's blood pressure spiked to 208, prompting emergency room visit. During hospitalization, the patient failed a stress test and subsequently had a stent placed in his distal right coronary artery, marking his sixth stent placement overall. Rashel denies having had bypass surgery. Post-procedure, the patient reports improvement in chest symptoms but notes minimal physical activity. Recent blood sugar have been elevated, with readings of 212 and 239 observed yesterday. Rashel discontinued Mounjaro 4-5 months  ago due to cost issues and is interested in restarting if affordable.      ROS:  Cardiovascular: +chest pain         Review of Systems   Constitutional:  Negative for fever.   Respiratory:  Negative for shortness of breath.    Cardiovascular:  Negative for chest pain.   Gastrointestinal:  Negative for abdominal pain.       I personally reviewed past medical, family and social history.     Objective    Vitals:    01/07/25 1448   BP: 124/66   Pulse: 76      Wt Readings from Last 3 Encounters:   01/07/25 1448 126.5 kg (278 lb 14.1 oz)   12/28/24 0741 128.4 kg (283 lb)   12/27/24 2221 128.8 kg (283 lb 15.2 oz)   10/28/24 0959 123.4 kg (272 lb)      Body mass index is 41.18 kg/m².     Physical Exam    Cardiovascular: No murmurs. No rubs. No gallops.  Respiratory: Clear to auscultation bilaterally.  Extremities: Mild pitting edema in both lower extremities.        Reference     : Visit today included increased complexity associated with the care of the episodic problem Hospital discharge follow-up [Z09] addressed and managing the longitudinal care of the patient due to the serious and/or complex managed problem(s)     Active Problem List with Overview Notes    Diagnosis Date Noted    Thoracic aorta atherosclerosis 09/21/2022     Seen on CTA chest noncoronary 03/12/2020      Obstructive sleep apnea 10/17/2019    Coronary artery disease involving native coronary artery of native heart with other form of angina pectoris 09/16/2019     Five stents  Stable, continue aspirin, beta-blocker, statin  Continue follow-up with Cardiology.      Chronic dyspnea 08/19/2019     Seeing Dr. Ogden and metoprolol discontinued and he was put on ranexa and bistolic. Dyspnea has improved.       Mixed hyperlipidemia 08/19/2019     LDL at goal but triglycerides elevated.  Continue fenofibrate and Crestor      Gastroesophageal reflux disease without esophagitis 08/15/2017    Essential hypertension 08/09/2017     Controlled, continue regimen  listed.      Severe obesity (BMI >= 40) 08/09/2017    Type 2 diabetes mellitus with other specified complication, with long-term current use of insulin 08/09/2017     Controlled.  Following with endo.  Has virtual visit today.  May discontinue Jardiance to help with cost of medications.        Unstable angina 12/31/2024    Chest pain due to myocardial ischemia 12/28/2024    Hypomagnesemia 12/28/2024    Tremor of right hand 04/02/2024     Onset 2023. Family history of parkinson disease. hand tremor occurs when he does not feel well. Resting and with activity.       History of deep vein thrombosis (DVT) of lower extremity 10/17/2019    Stented coronary artery 10/07/2019    Long term (current) use of antithrombotics/antiplatelets 09/16/2019    Tachycardia 08/09/2017    Venous insufficiency 08/09/2017        Diabetes Medications               glimepiride (AMARYL) 4 MG tablet TAKE 1 TABLET TWICE A DAY WITH MEALS    metFORMIN (GLUCOPHAGE) 1000 MG tablet TAKE 1 TABLET TWICE A DAY WITH MEALS    semaglutide (OZEMPIC) 1 mg/dose (4 mg/3 mL) Inject 1 mg into the skin every 7 days.    TOUJEO MAX U-300 SOLOSTAR 300 unit/mL (3 mL) insulin pen Inject 58 Units into the skin every evening.    TRESIBA FLEXTOUCH U-200 200 unit/mL (3 mL) insulin pen Inject 58 Units into the skin every evening.           Hypertension Medications               amLODIPine (NORVASC) 10 MG tablet TAKE 1 TABLET DAILY    hydrALAZINE (APRESOLINE) 100 MG tablet TAKE 1 TABLET TWICE A DAY    nebivoloL (BYSTOLIC) 10 MG Tab TAKE 1 TABLET DAILY    spironolactone (ALDACTONE) 25 MG tablet TAKE 1 TABLET DAILY    valsartan (DIOVAN) 320 MG tablet TAKE 1 TABLET DAILY           Hyperlipidemia Medications               fenofibrate (TRICOR) 145 MG tablet TAKE 1 TABLET DAILY    rosuvastatin (CRESTOR) 5 MG tablet Take 1 tablet (5 mg total) by mouth every evening.           Medication List with Changes/Refills   New Medications    TIRZEPATIDE (MOUNJARO) 2.5 MG/0.5 ML PNIJ     "Inject 2.5 mg into the skin every 7 days.   Current Medications    ACETAMINOPHEN (TYLENOL) 325 MG TABLET    Take 2 tablets (650 mg total) by mouth every 4 (four) hours as needed for Temperature greater than or Pain.    AMLODIPINE (NORVASC) 10 MG TABLET    TAKE 1 TABLET DAILY    ASPIRIN (ECOTRIN) 81 MG EC TABLET    Take 81 mg by mouth every evening.    CLOPIDOGREL (PLAVIX) 75 MG TABLET    Take 1 tablet (75 mg total) by mouth once daily.    FENOFIBRATE (TRICOR) 145 MG TABLET    TAKE 1 TABLET DAILY    GLIMEPIRIDE (AMARYL) 4 MG TABLET    TAKE 1 TABLET TWICE A DAY WITH MEALS    HYDRALAZINE (APRESOLINE) 100 MG TABLET    TAKE 1 TABLET TWICE A DAY    METFORMIN (GLUCOPHAGE) 1000 MG TABLET    TAKE 1 TABLET TWICE A DAY WITH MEALS    NEBIVOLOL (BYSTOLIC) 10 MG TAB    TAKE 1 TABLET DAILY    PANTOPRAZOLE (PROTONIX) 40 MG TABLET    Take 40 mg by mouth 2 (two) times daily.    PEN NEEDLE, DIABETIC (BD ULTRA-FINE MICRO PEN NEEDLE) 32 GAUGE X 1/4" NDLE    Use one daily with insulin    RANOLAZINE (RANEXA) 500 MG TB12    TAKE 1 TABLET TWICE A DAY    ROSUVASTATIN (CRESTOR) 5 MG TABLET    Take 1 tablet (5 mg total) by mouth every evening.    SILDENAFIL (VIAGRA) 50 MG TABLET    Take 1 tablet (50 mg total) by mouth daily as needed for Erectile Dysfunction.    SPIRONOLACTONE (ALDACTONE) 25 MG TABLET    TAKE 1 TABLET DAILY    TOUJEO MAX U-300 SOLOSTAR 300 UNIT/ML (3 ML) INSULIN PEN    Inject 58 Units into the skin every evening.    TRESIBA FLEXTOUCH U-200 200 UNIT/ML (3 ML) INSULIN PEN    Inject 58 Units into the skin every evening.    VALSARTAN (DIOVAN) 320 MG TABLET    TAKE 1 TABLET DAILY   Discontinued Medications    SEMAGLUTIDE (OZEMPIC) 1 MG/DOSE (4 MG/3 ML)    Inject 1 mg into the skin every 7 days.         This note was generated with the assistance of ambient listening technology. Verbal consent was obtained by the patient and accompanying visitor(s) for the recording of patient appointment to facilitate this note. I attest to having " reviewed and edited the generated note for accuracy, though some syntax or spelling errors may persist. Please contact the author of this note for any clarification.

## 2025-01-14 PROBLEM — R00.0 TACHYCARDIA: Status: RESOLVED | Noted: 2017-08-09 | Resolved: 2025-01-14

## 2025-01-14 PROBLEM — Z95.5 S/P RIGHT CORONARY ARTERY (RCA) STENT PLACEMENT: Status: ACTIVE | Noted: 2025-01-14

## 2025-01-14 PROBLEM — I20.0 UNSTABLE ANGINA: Status: RESOLVED | Noted: 2024-12-31 | Resolved: 2025-01-14

## 2025-01-14 PROBLEM — I25.9 CHEST PAIN DUE TO MYOCARDIAL ISCHEMIA: Status: RESOLVED | Noted: 2024-12-28 | Resolved: 2025-01-14

## 2025-01-16 ENCOUNTER — PATIENT MESSAGE (OUTPATIENT)
Dept: ENDOCRINOLOGY | Facility: CLINIC | Age: 65
End: 2025-01-16
Payer: MEDICARE

## 2025-01-16 DIAGNOSIS — E11.69 TYPE 2 DIABETES MELLITUS WITH OTHER SPECIFIED COMPLICATION, WITH LONG-TERM CURRENT USE OF INSULIN: ICD-10-CM

## 2025-01-16 DIAGNOSIS — Z79.4 TYPE 2 DIABETES MELLITUS WITH OTHER SPECIFIED COMPLICATION, WITH LONG-TERM CURRENT USE OF INSULIN: ICD-10-CM

## 2025-01-17 RX ORDER — TIRZEPATIDE 2.5 MG/.5ML
2.5 INJECTION, SOLUTION SUBCUTANEOUS
Qty: 2 ML | Refills: 0 | Status: SHIPPED | OUTPATIENT
Start: 2025-01-17

## 2025-02-05 DIAGNOSIS — E11.8 TYPE 2 DIABETES MELLITUS WITH COMPLICATION, WITHOUT LONG-TERM CURRENT USE OF INSULIN: ICD-10-CM

## 2025-02-06 ENCOUNTER — LAB VISIT (OUTPATIENT)
Dept: PRIMARY CARE CLINIC | Facility: CLINIC | Age: 65
End: 2025-02-06
Payer: MEDICARE

## 2025-02-06 DIAGNOSIS — E11.9 TYPE 2 DIABETES MELLITUS WITHOUT COMPLICATION, WITH LONG-TERM CURRENT USE OF INSULIN: ICD-10-CM

## 2025-02-06 DIAGNOSIS — Z79.4 TYPE 2 DIABETES MELLITUS WITHOUT COMPLICATION, WITH LONG-TERM CURRENT USE OF INSULIN: ICD-10-CM

## 2025-02-06 DIAGNOSIS — E11.69 TYPE 2 DIABETES MELLITUS WITH OTHER SPECIFIED COMPLICATION, WITH LONG-TERM CURRENT USE OF INSULIN: ICD-10-CM

## 2025-02-06 DIAGNOSIS — Z79.4 TYPE 2 DIABETES MELLITUS WITH OTHER SPECIFIED COMPLICATION, WITH LONG-TERM CURRENT USE OF INSULIN: ICD-10-CM

## 2025-02-06 LAB
ESTIMATED AVG GLUCOSE: 200 MG/DL (ref 68–131)
HBA1C MFR BLD: 8.6 % (ref 4–5.6)
TSH SERPL DL<=0.005 MIU/L-ACNC: 1.4 UIU/ML (ref 0.4–4)

## 2025-02-06 PROCEDURE — 83036 HEMOGLOBIN GLYCOSYLATED A1C: CPT | Performed by: INTERNAL MEDICINE

## 2025-02-06 PROCEDURE — 84443 ASSAY THYROID STIM HORMONE: CPT | Performed by: NURSE PRACTITIONER

## 2025-02-07 RX ORDER — METFORMIN HYDROCHLORIDE 1000 MG/1
TABLET ORAL
Qty: 180 TABLET | Refills: 3 | Status: SHIPPED | OUTPATIENT
Start: 2025-02-07

## 2025-02-12 ENCOUNTER — OFFICE VISIT (OUTPATIENT)
Dept: ENDOCRINOLOGY | Facility: CLINIC | Age: 65
End: 2025-02-12
Payer: MEDICARE

## 2025-02-12 VITALS
HEART RATE: 69 BPM | DIASTOLIC BLOOD PRESSURE: 60 MMHG | HEIGHT: 69 IN | BODY MASS INDEX: 41.09 KG/M2 | WEIGHT: 277.44 LBS | SYSTOLIC BLOOD PRESSURE: 120 MMHG

## 2025-02-12 DIAGNOSIS — Z79.4 TYPE 2 DIABETES MELLITUS WITH OTHER SPECIFIED COMPLICATION, WITH LONG-TERM CURRENT USE OF INSULIN: ICD-10-CM

## 2025-02-12 DIAGNOSIS — E78.00 HYPERCHOLESTEROLEMIA: ICD-10-CM

## 2025-02-12 DIAGNOSIS — E11.8 TYPE 2 DIABETES MELLITUS WITH COMPLICATION, WITHOUT LONG-TERM CURRENT USE OF INSULIN: Primary | ICD-10-CM

## 2025-02-12 DIAGNOSIS — I10 ESSENTIAL HYPERTENSION: ICD-10-CM

## 2025-02-12 DIAGNOSIS — E66.01 MORBID OBESITY WITH BMI OF 40.0-44.9, ADULT: ICD-10-CM

## 2025-02-12 DIAGNOSIS — I25.10 CORONARY ARTERY DISEASE INVOLVING NATIVE CORONARY ARTERY OF NATIVE HEART WITHOUT ANGINA PECTORIS: ICD-10-CM

## 2025-02-12 DIAGNOSIS — E11.69 TYPE 2 DIABETES MELLITUS WITH OTHER SPECIFIED COMPLICATION, WITH LONG-TERM CURRENT USE OF INSULIN: ICD-10-CM

## 2025-02-12 PROCEDURE — 3008F BODY MASS INDEX DOCD: CPT | Mod: CPTII,S$GLB,, | Performed by: NURSE PRACTITIONER

## 2025-02-12 PROCEDURE — 3078F DIAST BP <80 MM HG: CPT | Mod: CPTII,S$GLB,, | Performed by: NURSE PRACTITIONER

## 2025-02-12 PROCEDURE — 1160F RVW MEDS BY RX/DR IN RCRD: CPT | Mod: CPTII,S$GLB,, | Performed by: NURSE PRACTITIONER

## 2025-02-12 PROCEDURE — 1159F MED LIST DOCD IN RCRD: CPT | Mod: CPTII,S$GLB,, | Performed by: NURSE PRACTITIONER

## 2025-02-12 PROCEDURE — 3288F FALL RISK ASSESSMENT DOCD: CPT | Mod: CPTII,S$GLB,, | Performed by: NURSE PRACTITIONER

## 2025-02-12 PROCEDURE — 3052F HG A1C>EQUAL 8.0%<EQUAL 9.0%: CPT | Mod: CPTII,S$GLB,, | Performed by: NURSE PRACTITIONER

## 2025-02-12 PROCEDURE — 3074F SYST BP LT 130 MM HG: CPT | Mod: CPTII,S$GLB,, | Performed by: NURSE PRACTITIONER

## 2025-02-12 PROCEDURE — 99999 PR PBB SHADOW E&M-EST. PATIENT-LVL IV: CPT | Mod: PBBFAC,,, | Performed by: NURSE PRACTITIONER

## 2025-02-12 PROCEDURE — G2211 COMPLEX E/M VISIT ADD ON: HCPCS | Mod: S$GLB,,, | Performed by: NURSE PRACTITIONER

## 2025-02-12 PROCEDURE — 1101F PT FALLS ASSESS-DOCD LE1/YR: CPT | Mod: CPTII,S$GLB,, | Performed by: NURSE PRACTITIONER

## 2025-02-12 PROCEDURE — 99214 OFFICE O/P EST MOD 30 MIN: CPT | Mod: S$GLB,,, | Performed by: NURSE PRACTITIONER

## 2025-02-12 RX ORDER — INSULIN GLARGINE 300 U/ML
68 INJECTION, SOLUTION SUBCUTANEOUS NIGHTLY
Start: 2025-02-12 | End: 2026-02-12

## 2025-02-12 RX ORDER — TIRZEPATIDE 7.5 MG/.5ML
7.5 INJECTION, SOLUTION SUBCUTANEOUS
Qty: 2 ML | Refills: 0 | Status: SHIPPED | OUTPATIENT
Start: 2025-03-12

## 2025-02-12 RX ORDER — TIRZEPATIDE 5 MG/.5ML
5 INJECTION, SOLUTION SUBCUTANEOUS
Qty: 2 ML | Refills: 0 | Status: SHIPPED | OUTPATIENT
Start: 2025-02-12

## 2025-02-12 RX ORDER — TIRZEPATIDE 10 MG/.5ML
10 INJECTION, SOLUTION SUBCUTANEOUS
Qty: 2 ML | Refills: 0 | Status: SHIPPED | OUTPATIENT
Start: 2025-04-11

## 2025-02-12 NOTE — PROGRESS NOTES
CC: Mr. Rashel Munson III arrives today for management of Type 2 DM and review of chronic medical conditions, as listed in the Visit Diagnosis section of this encounter.       HPI: Mr. Rashel Munson III was diagnosed with Type 2 DM in ~2010. He was diagnosed based on lab work. Initial treatment consisted of metformin. + FH of DM in maternal GM and uncles. Denies hospitalizations due to DM.   He has CAD and recently with multiple stent placements. Follows with Dr. Ogden.     He participates in the digital diabetes program.    Patient was last seen by me in October.    He received new stent in December, following bout of increased chest pain.     He stopped taking Mounjaro in October, due to cost. He resumed this in January at 2.5 mg weekly. He has 1 pen left.    He had been working with WILMAN Cottrell with the Patient Assistance team with regard to Novolog, Tresiba U200, pen needles assistance from [employer]. Documentation was requested from the pt in 10/2024 in order to fulfill application requirements but he states that he never submitted.     BG readings are checked 1x/day          Hypoglycemia: No    Missing Insulin/PO medication doses: Rare.    Exercise: Yes - cardiac rehab 3 days/week     Dietary Habits: Eats 3 meals/day.  Rare snacking. Avoids sugary beverages.     Last DM education appointment: 9/2020        CURRENT DIABETIC MEDS: metformin 1000 mg BID, glimepiride 4 mg BID, Mounjaro 2.5 mg weekly, Toujeo Max 58 units QHS  Vial or pen: n/a  Glucometer type: Darlyn    Previous DM treatments:  Glyburide   Ozempic - expensive  Tresiba   Jardiance   Toujeo Max (dose was 58 units)    Last Eye Exam: 11/2023, no DR   Last Podiatry Exam: n/a    REVIEW OF SYSTEMS  Constitutional: no c/o fatigue, weakness, weight loss.   Cardiac: no palpitations or chest pain.  Respiratory: no cough. Reports stable, chronic dyspnea   GI: no c/o abdominal pain or nausea. Denies h/o pancreatitis.    Skin: no lesions or  "rashes.  Neuro: no c/o numbness, tingling, paresthesias.   Endocrine: denies polyphagia, polydipsia, polyuria. Denies personal or family h/o MTC, MEN2.      Personally reviewed Past Medical, Surgical, Social History.    Vital Signs  /60   Pulse 69   Ht 5' 9" (1.753 m)   Wt 125.8 kg (277 lb 7.2 oz)   BMI 40.97 kg/m²      Personally reviewed the below labs:    Hemoglobin A1C   Date Value Ref Range Status   02/06/2025 8.6 (H) 4.0 - 5.6 % Final     Comment:     ADA Screening Guidelines:  5.7-6.4%  Consistent with prediabetes  >or=6.5%  Consistent with diabetes    High levels of fetal hemoglobin interfere with the HbA1C  assay. Heterozygous hemoglobin variants (HbS, HgC, etc)do  not significantly interfere with this assay.   However, presence of multiple variants may affect accuracy.     09/05/2024 6.4 (H) 4.0 - 5.6 % Final     Comment:     ADA Screening Guidelines:  5.7-6.4%  Consistent with prediabetes  >or=6.5%  Consistent with diabetes    High levels of fetal hemoglobin interfere with the HbA1C  assay. Heterozygous hemoglobin variants (HbS, HgC, etc)do  not significantly interfere with this assay.   However, presence of multiple variants may affect accuracy.     03/21/2024 6.4 (H) 4.0 - 5.6 % Final     Comment:     ADA Screening Guidelines:  5.7-6.4%  Consistent with prediabetes  >or=6.5%  Consistent with diabetes    High levels of fetal hemoglobin interfere with the HbA1C  assay. Heterozygous hemoglobin variants (HbS, HgC, etc)do  not significantly interfere with this assay.   However, presence of multiple variants may affect accuracy.         Chemistry        Component Value Date/Time     12/30/2024 0503    K 3.7 12/30/2024 0503     12/30/2024 0503    CO2 24 12/30/2024 0503    BUN 19 12/30/2024 0503    CREATININE 1.06 12/30/2024 0503     (H) 12/30/2024 0503        Component Value Date/Time    CALCIUM 8.8 12/30/2024 0503    ALKPHOS 116 12/27/2024 2242    AST 34 12/27/2024 2242    ALT 32 " 12/27/2024 2242    BILITOT 0.3 12/27/2024 2242    ESTGFRAFRICA >60.0 06/16/2022 0923    EGFRNONAA >60.0 06/16/2022 0923          Lab Results   Component Value Date    CHOL 108 (L) 12/28/2024    CHOL 86 (L) 09/05/2024    CHOL 107 (L) 07/27/2023     Lab Results   Component Value Date    HDL 22 (L) 12/28/2024    HDL 26 (L) 09/05/2024    HDL 23 (L) 07/27/2023     Lab Results   Component Value Date    LDLCALC 20.4 (L) 12/28/2024    LDLCALC 16.8 (L) 09/05/2024    LDLCALC 23.4 (L) 07/27/2023     Lab Results   Component Value Date    TRIG 328 (H) 12/28/2024    TRIG 216 (H) 09/05/2024    TRIG 303 (H) 07/27/2023     Lab Results   Component Value Date    CHOLHDL 20.4 12/28/2024    CHOLHDL 30.2 09/05/2024    CHOLHDL 21.5 07/27/2023       Lab Results   Component Value Date    MICALBCREAT 4.9 09/05/2024     Lab Results   Component Value Date    TSH 1.403 02/06/2025       CrCl cannot be calculated (Patient's most recent lab result is older than the maximum 7 days allowed.).    Vit D, 25-Hydroxy   Date Value Ref Range Status   09/15/2022 28 (L) 30 - 96 ng/mL Final     Comment:     Vitamin D deficiency.........<10 ng/mL                              Vitamin D insufficiency......10-29 ng/mL       Vitamin D sufficiency........> or equal to 30 ng/mL  Vitamin D toxicity............>100 ng/mL           PHYSICAL EXAMINATION  Constitutional: Appears well, no distress  Respiratory: CTA, even and unlabored.  Cardiovascular: RRR, no murmurs, no carotid bruits.   GI: active bowel sounds, no hernia noted.  Skin: warm and dry; no visible wounds  Neuro: oriented to person, place, time  Feet: appropriate footwear.  Protective Sensation (w/ 10 gram monofilament):  Right: Intact  Left: Intact    Visual Inspection:  Normal -  Bilateral    Pedal Pulses:   Right: Present  Left: Present    Posterior Tibialis Pulses:   Right:Present  Left: Present         Goals         80 <= Glucose <= 180 (pt-stated)       Blood Pressure < 130/80       Exercise at least  150 minutes per week.       HEMOGLOBIN A1C < 7       Take at least one BP reading per week at various times of the day                 Assessment/Plan  1. Type 2 diabetes mellitus with complication, with long-term current use of insulin  -- Worsening. A1c elevation is likely related to being off of Mounjaro. He has since resumed and we will gradually work dose up to max tolerated. Previously tolerated 12.5 mg well. Would ideally add Jardiance but will hold off for now, as pt has cost concerns with branded meds.  -- increase Toujeo Max to 68 units nightly for the time being.   -- increase Mounjaro to 5 mg next month. Will continue increasing every 4 weeks until reaching 10 mg. Will likely increase further.   -- continue glimepiride, metformin  -- check BG 2x/day.   -- schedule eye exam    -- takes ARB, statin, aspirin   2. Coronary artery disease involving native coronary artery of native heart without angina pectoris  -- optimize DM control.  -- follows with cardiology   3. Hypertension  -- continue current meds.   -- Managed by cardiology   4. Hypercholesterolemia  -- uncontrolled with elevated TRG  -- optimize DM control. Reduce white carbs, added sugars.  -- taking Crestor, fenofibrate   -- follows with cardiology   5. Morbid obesity with BMI of 40.0-44.9, adult  -- increasing insulin resistance.  Body mass index is 40.97 kg/m².        FOLLOW UP  Follow up in about 4 months (around 6/12/2025).  Patient instructed to bring BG logs to each follow up   Patient encouraged to call for any BG/medication issues, concerns, or questions.    Orders Placed This Encounter   Procedures    Comprehensive Metabolic Panel

## 2025-02-12 NOTE — PATIENT INSTRUCTIONS
Increase Toujeo Max to 68 units.     Increase Mounjaro to 5 mg. The next month will be 7.5 mg. The next month will be 10 mg.     Continue metformin, glimepiride.     Notify me if having any readings less than 100.

## 2025-04-04 ENCOUNTER — PATIENT OUTREACH (OUTPATIENT)
Dept: ADMINISTRATIVE | Facility: HOSPITAL | Age: 65
End: 2025-04-04
Payer: MEDICARE

## 2025-04-14 ENCOUNTER — PATIENT MESSAGE (OUTPATIENT)
Dept: ADMINISTRATIVE | Facility: OTHER | Age: 65
End: 2025-04-14
Payer: MEDICARE

## 2025-04-28 ENCOUNTER — OFFICE VISIT (OUTPATIENT)
Dept: FAMILY MEDICINE | Facility: CLINIC | Age: 65
End: 2025-04-28
Payer: MEDICARE

## 2025-04-28 VITALS
HEART RATE: 72 BPM | BODY MASS INDEX: 40.81 KG/M2 | OXYGEN SATURATION: 95 % | HEIGHT: 69 IN | SYSTOLIC BLOOD PRESSURE: 132 MMHG | DIASTOLIC BLOOD PRESSURE: 78 MMHG | WEIGHT: 275.56 LBS

## 2025-04-28 DIAGNOSIS — M54.50 ACUTE RIGHT-SIDED LOW BACK PAIN WITHOUT SCIATICA: Primary | ICD-10-CM

## 2025-04-28 PROCEDURE — 3288F FALL RISK ASSESSMENT DOCD: CPT | Mod: CPTII,S$GLB,, | Performed by: INTERNAL MEDICINE

## 2025-04-28 PROCEDURE — 3078F DIAST BP <80 MM HG: CPT | Mod: CPTII,S$GLB,, | Performed by: INTERNAL MEDICINE

## 2025-04-28 PROCEDURE — 1160F RVW MEDS BY RX/DR IN RCRD: CPT | Mod: CPTII,S$GLB,, | Performed by: INTERNAL MEDICINE

## 2025-04-28 PROCEDURE — 3008F BODY MASS INDEX DOCD: CPT | Mod: CPTII,S$GLB,, | Performed by: INTERNAL MEDICINE

## 2025-04-28 PROCEDURE — 4010F ACE/ARB THERAPY RXD/TAKEN: CPT | Mod: CPTII,S$GLB,, | Performed by: INTERNAL MEDICINE

## 2025-04-28 PROCEDURE — 99213 OFFICE O/P EST LOW 20 MIN: CPT | Mod: S$GLB,,, | Performed by: INTERNAL MEDICINE

## 2025-04-28 PROCEDURE — 3075F SYST BP GE 130 - 139MM HG: CPT | Mod: CPTII,S$GLB,, | Performed by: INTERNAL MEDICINE

## 2025-04-28 PROCEDURE — G2211 COMPLEX E/M VISIT ADD ON: HCPCS | Mod: S$GLB,,, | Performed by: INTERNAL MEDICINE

## 2025-04-28 PROCEDURE — 99999 PR PBB SHADOW E&M-EST. PATIENT-LVL V: CPT | Mod: PBBFAC,,, | Performed by: INTERNAL MEDICINE

## 2025-04-28 PROCEDURE — 3052F HG A1C>EQUAL 8.0%<EQUAL 9.0%: CPT | Mod: CPTII,S$GLB,, | Performed by: INTERNAL MEDICINE

## 2025-04-28 PROCEDURE — 1159F MED LIST DOCD IN RCRD: CPT | Mod: CPTII,S$GLB,, | Performed by: INTERNAL MEDICINE

## 2025-04-28 PROCEDURE — 1101F PT FALLS ASSESS-DOCD LE1/YR: CPT | Mod: CPTII,S$GLB,, | Performed by: INTERNAL MEDICINE

## 2025-04-28 RX ORDER — TIZANIDINE 2 MG/1
2 TABLET ORAL NIGHTLY
Qty: 30 TABLET | Refills: 0 | Status: SHIPPED | OUTPATIENT
Start: 2025-04-28 | End: 2025-05-28

## 2025-04-28 NOTE — PROGRESS NOTES
Patient ID: Rashel Barrera III is a 65 y.o. male.    Chief Complaint: Back Pain (Lasting for a couple weeks, worse over time )       Assessment and plan   Acute right-sided low back pain without sciatica  -     tiZANidine (ZANAFLEX) 2 MG tablet; Take 1 tablet (2 mg total) by mouth every evening.  Dispense: 30 tablet; Refill: 0       Low back exercises.   Muscle relaxer trial     History of present illness     Right low back/ side pain x 2 weeks. Worse when standing. Friday night it was unbearable. It was lessened some. Thinks he may have pulled a muscle.  Positive right straight leg    Review of Systems   Constitutional:  Negative for fever.   Respiratory:  Negative for shortness of breath.    Cardiovascular:  Negative for chest pain.   Gastrointestinal:  Negative for abdominal pain.   Musculoskeletal:  Positive for back pain.       I personally reviewed past medical, family and social history.     Objective    Vitals:    04/28/25 1522   BP: 132/78   Pulse: 72      Wt Readings from Last 3 Encounters:   04/28/25 1522 125 kg (275 lb 9.2 oz)   04/14/25 1107 125.2 kg (276 lb)   02/12/25 1031 125.8 kg (277 lb 7.2 oz)      Body mass index is 40.7 kg/m².     Physical Exam  Cardiovascular:      Rate and Rhythm: Normal rate and regular rhythm.      Heart sounds: No murmur heard.     No gallop.   Pulmonary:      Breath sounds: Normal breath sounds. No wheezing or rhonchi.   Abdominal:      Palpations: Abdomen is soft.      Tenderness: There is no abdominal tenderness.        Reference     : Visit today included increased complexity associated with the care of the episodic problem Acute right-sided low back pain without sciatica [M54.50] addressed and managing the longitudinal care of the patient due to the serious and/or complex managed problem(s)     Active Problem List with Overview Notes    Diagnosis Date Noted    Thoracic aorta atherosclerosis 09/21/2022     Seen on CTA chest noncoronary 03/12/2020       Obstructive sleep apnea 10/17/2019    Coronary artery disease involving native coronary artery of native heart with other form of angina pectoris 09/16/2019     Five stents  Stable, continue aspirin, beta-blocker, statin  Continue follow-up with Cardiology.      Chronic dyspnea 08/19/2019     Seeing Dr. Ogden and metoprolol discontinued and he was put on ranexa and bistolic. Dyspnea has improved.       Mixed hyperlipidemia 08/19/2019     LDL at goal but triglycerides elevated.  Continue fenofibrate and Crestor      Gastroesophageal reflux disease without esophagitis 08/15/2017    Essential hypertension 08/09/2017     Controlled, continue regimen listed.      Severe obesity (BMI >= 40) 08/09/2017    Type 2 diabetes mellitus with other specified complication, with long-term current use of insulin 08/09/2017     Controlled.  Following with endo.  Has virtual visit today.  May discontinue Jardiance to help with cost of medications.        S/P right coronary artery (RCA) stent placement 01/14/2025    Hypomagnesemia 12/28/2024    Tremor of right hand 04/02/2024     Onset 2023. Family history of parkinson disease. hand tremor occurs when he does not feel well. Resting and with activity.       History of deep vein thrombosis (DVT) of lower extremity 10/17/2019    Stented coronary artery 10/07/2019    Long term (current) use of antithrombotics/antiplatelets 09/16/2019    Venous insufficiency 08/09/2017        Diabetes Medications              glimepiride (AMARYL) 4 MG tablet TAKE 1 TABLET TWICE A DAY WITH MEALS    metFORMIN (GLUCOPHAGE) 1000 MG tablet TAKE 1 TABLET TWICE A DAY WITH MEALS    MOUNJARO 10 mg/0.5 mL PnIj Inject 10 mg into the skin every 7 days.    MOUNJARO 2.5 mg/0.5 mL PnIj Inject 2.5 mg into the skin every 7 days.    MOUNJARO 5 mg/0.5 mL PnIj Inject 5 mg into the skin every 7 days.    MOUNJARO 7.5 mg/0.5 mL PnIj Inject 7.5 mg into the skin every 7 days.    TOUJEO MAX U-300 SOLOSTAR 300 unit/mL (3 mL) insulin  "pen Inject 68 Units into the skin every evening.           Hypertension Medications              amLODIPine (NORVASC) 10 MG tablet TAKE 1 TABLET DAILY    hydrALAZINE (APRESOLINE) 100 MG tablet TAKE 1 TABLET TWICE A DAY    nebivoloL (BYSTOLIC) 10 MG Tab TAKE 1 TABLET DAILY    spironolactone (ALDACTONE) 25 MG tablet TAKE 1 TABLET DAILY    valsartan (DIOVAN) 320 MG tablet TAKE 1 TABLET DAILY           Hyperlipidemia Medications              fenofibrate (TRICOR) 145 MG tablet TAKE 1 TABLET DAILY    rosuvastatin (CRESTOR) 5 MG tablet Take 1 tablet (5 mg total) by mouth every evening.           Medication List with Changes/Refills   New Medications    TIZANIDINE (ZANAFLEX) 2 MG TABLET    Take 1 tablet (2 mg total) by mouth every evening.   Current Medications    ACETAMINOPHEN (TYLENOL) 325 MG TABLET    Take 2 tablets (650 mg total) by mouth every 4 (four) hours as needed for Temperature greater than or Pain.    AMLODIPINE (NORVASC) 10 MG TABLET    TAKE 1 TABLET DAILY    ASPIRIN (ECOTRIN) 81 MG EC TABLET    Take 81 mg by mouth every evening.    CLOPIDOGREL (PLAVIX) 75 MG TABLET    Take 1 tablet (75 mg total) by mouth once daily.    FENOFIBRATE (TRICOR) 145 MG TABLET    TAKE 1 TABLET DAILY    GLIMEPIRIDE (AMARYL) 4 MG TABLET    TAKE 1 TABLET TWICE A DAY WITH MEALS    HYDRALAZINE (APRESOLINE) 100 MG TABLET    TAKE 1 TABLET TWICE A DAY    METFORMIN (GLUCOPHAGE) 1000 MG TABLET    TAKE 1 TABLET TWICE A DAY WITH MEALS    MOUNJARO 10 MG/0.5 ML PNIJ    Inject 10 mg into the skin every 7 days.    MOUNJARO 2.5 MG/0.5 ML PNIJ    Inject 2.5 mg into the skin every 7 days.    MOUNJARO 5 MG/0.5 ML PNIJ    Inject 5 mg into the skin every 7 days.    MOUNJARO 7.5 MG/0.5 ML PNIJ    Inject 7.5 mg into the skin every 7 days.    NEBIVOLOL (BYSTOLIC) 10 MG TAB    TAKE 1 TABLET DAILY    PANTOPRAZOLE (PROTONIX) 40 MG TABLET    Take 40 mg by mouth 2 (two) times daily.    PEN NEEDLE, DIABETIC (BD ULTRA-FINE MICRO PEN NEEDLE) 32 GAUGE X 1/4" NDLE    " Use one daily with insulin    RANOLAZINE (RANEXA) 500 MG TB12    TAKE 1 TABLET TWICE A DAY    ROSUVASTATIN (CRESTOR) 5 MG TABLET    Take 1 tablet (5 mg total) by mouth every evening.    SILDENAFIL (VIAGRA) 50 MG TABLET    Take 1 tablet (50 mg total) by mouth daily as needed for Erectile Dysfunction.    SPIRONOLACTONE (ALDACTONE) 25 MG TABLET    TAKE 1 TABLET DAILY    TOUJEO MAX U-300 SOLOSTAR 300 UNIT/ML (3 ML) INSULIN PEN    Inject 68 Units into the skin every evening.    VALSARTAN (DIOVAN) 320 MG TABLET    TAKE 1 TABLET DAILY

## 2025-05-02 ENCOUNTER — OFFICE VISIT (OUTPATIENT)
Dept: OPTOMETRY | Facility: CLINIC | Age: 65
End: 2025-05-02
Payer: MEDICARE

## 2025-05-02 DIAGNOSIS — H25.13 NUCLEAR SCLEROSIS OF BOTH EYES: ICD-10-CM

## 2025-05-02 DIAGNOSIS — E11.9 TYPE 2 DIABETES MELLITUS WITHOUT RETINOPATHY: Primary | ICD-10-CM

## 2025-05-02 PROCEDURE — 99999 PR PBB SHADOW E&M-EST. PATIENT-LVL III: CPT | Mod: PBBFAC,,, | Performed by: OPTOMETRIST

## 2025-05-02 NOTE — PROGRESS NOTES
HPI     Diabetic Eye Exam            Comments: Ldme 11/15/2023          Comments    Pt states vision is stable since last exam does not need specs for dva   only otc readers   No gtts used   Denies F/F  Hemoglobin A1C       Date                     Value               Ref Range             Status                02/06/2025               8.6 (H)             4.0 - 5.6 %           Final                        Last edited by Brenda Son on 5/2/2025  1:14 PM.            Assessment /Plan     For exam results, see Encounter Report.    Type 2 diabetes mellitus without retinopathy    Nuclear sclerosis of both eyes      No diabetic retinopathy, no csme. Return in 1 year for dilated eye exam.  2. Educated pt on presence of cataracts and effects on vision. No surgery at this time. Recheck in one year.

## 2025-05-08 ENCOUNTER — PATIENT MESSAGE (OUTPATIENT)
Dept: ENDOCRINOLOGY | Facility: CLINIC | Age: 65
End: 2025-05-08
Payer: MEDICARE

## 2025-05-08 DIAGNOSIS — E11.8 TYPE 2 DIABETES MELLITUS WITH COMPLICATION, WITHOUT LONG-TERM CURRENT USE OF INSULIN: ICD-10-CM

## 2025-05-08 RX ORDER — TIRZEPATIDE 10 MG/.5ML
10 INJECTION, SOLUTION SUBCUTANEOUS
Qty: 2 ML | Refills: 6 | Status: SHIPPED | OUTPATIENT
Start: 2025-05-08

## 2025-05-23 ENCOUNTER — PATIENT MESSAGE (OUTPATIENT)
Dept: ENDOCRINOLOGY | Facility: CLINIC | Age: 65
End: 2025-05-23
Payer: MEDICARE

## 2025-05-23 DIAGNOSIS — E11.8 TYPE 2 DIABETES MELLITUS WITH COMPLICATION, WITHOUT LONG-TERM CURRENT USE OF INSULIN: Primary | ICD-10-CM

## 2025-05-23 NOTE — TELEPHONE ENCOUNTER
I can send in. But want to make sure he is going to be able to keep taking when his deductible restarts at the beginning of the year?  Its better to be on a stable regimen that he will be on year round and not changing based on deductible.   And also will need to let us know if any hypoglycemia

## 2025-06-26 ENCOUNTER — RESULTS FOLLOW-UP (OUTPATIENT)
Dept: FAMILY MEDICINE | Facility: CLINIC | Age: 65
End: 2025-06-26

## 2025-06-26 ENCOUNTER — LAB VISIT (OUTPATIENT)
Dept: PRIMARY CARE CLINIC | Facility: CLINIC | Age: 65
End: 2025-06-26
Attending: INTERNAL MEDICINE
Payer: MEDICARE

## 2025-06-26 DIAGNOSIS — E11.69 TYPE 2 DIABETES MELLITUS WITH OTHER SPECIFIED COMPLICATION, WITH LONG-TERM CURRENT USE OF INSULIN: ICD-10-CM

## 2025-06-26 DIAGNOSIS — E11.8 TYPE 2 DIABETES MELLITUS WITH COMPLICATION, WITHOUT LONG-TERM CURRENT USE OF INSULIN: ICD-10-CM

## 2025-06-26 DIAGNOSIS — Z79.4 TYPE 2 DIABETES MELLITUS WITH OTHER SPECIFIED COMPLICATION, WITH LONG-TERM CURRENT USE OF INSULIN: ICD-10-CM

## 2025-06-26 LAB
ALBUMIN SERPL BCP-MCNC: 4.1 G/DL (ref 3.5–5.2)
ALP SERPL-CCNC: 53 UNIT/L (ref 40–150)
ALT SERPL W/O P-5'-P-CCNC: 38 UNIT/L (ref 10–44)
ANION GAP (OHS): 10 MMOL/L (ref 8–16)
AST SERPL-CCNC: 30 UNIT/L (ref 11–45)
BILIRUB SERPL-MCNC: 0.5 MG/DL (ref 0.1–1)
BUN SERPL-MCNC: 18 MG/DL (ref 8–23)
CALCIUM SERPL-MCNC: 9.1 MG/DL (ref 8.7–10.5)
CHLORIDE SERPL-SCNC: 105 MMOL/L (ref 95–110)
CO2 SERPL-SCNC: 20 MMOL/L (ref 23–29)
CREAT SERPL-MCNC: 1.1 MG/DL (ref 0.5–1.4)
EAG (OHS): 131 MG/DL (ref 68–131)
GFR SERPLBLD CREATININE-BSD FMLA CKD-EPI: >60 ML/MIN/1.73/M2
GLUCOSE SERPL-MCNC: 172 MG/DL (ref 70–110)
HBA1C MFR BLD: 6.2 % (ref 4–5.6)
POTASSIUM SERPL-SCNC: 4.2 MMOL/L (ref 3.5–5.1)
PROT SERPL-MCNC: 7 GM/DL (ref 6–8.4)
SODIUM SERPL-SCNC: 135 MMOL/L (ref 136–145)

## 2025-06-26 PROCEDURE — 80053 COMPREHEN METABOLIC PANEL: CPT | Performed by: NURSE PRACTITIONER

## 2025-06-26 PROCEDURE — 36415 COLL VENOUS BLD VENIPUNCTURE: CPT | Mod: S$GLB,,, | Performed by: INTERNAL MEDICINE

## 2025-06-30 ENCOUNTER — TELEPHONE (OUTPATIENT)
Dept: ENDOCRINOLOGY | Facility: CLINIC | Age: 65
End: 2025-06-30
Payer: MEDICARE

## 2025-07-01 ENCOUNTER — OFFICE VISIT (OUTPATIENT)
Dept: ENDOCRINOLOGY | Facility: CLINIC | Age: 65
End: 2025-07-01
Payer: MEDICARE

## 2025-07-01 VITALS
HEART RATE: 67 BPM | BODY MASS INDEX: 39.53 KG/M2 | DIASTOLIC BLOOD PRESSURE: 60 MMHG | HEIGHT: 69 IN | SYSTOLIC BLOOD PRESSURE: 122 MMHG | WEIGHT: 266.88 LBS

## 2025-07-01 DIAGNOSIS — E66.01 MORBID OBESITY WITH BMI OF 40.0-44.9, ADULT: ICD-10-CM

## 2025-07-01 DIAGNOSIS — I25.10 CORONARY ARTERY DISEASE INVOLVING NATIVE CORONARY ARTERY OF NATIVE HEART WITHOUT ANGINA PECTORIS: ICD-10-CM

## 2025-07-01 DIAGNOSIS — E11.8 TYPE 2 DIABETES MELLITUS WITH COMPLICATION, WITHOUT LONG-TERM CURRENT USE OF INSULIN: Primary | ICD-10-CM

## 2025-07-01 DIAGNOSIS — I10 ESSENTIAL HYPERTENSION: ICD-10-CM

## 2025-07-01 DIAGNOSIS — E78.00 HYPERCHOLESTEROLEMIA: ICD-10-CM

## 2025-07-01 PROCEDURE — 99999 PR PBB SHADOW E&M-EST. PATIENT-LVL IV: CPT | Mod: PBBFAC,,, | Performed by: NURSE PRACTITIONER

## 2025-07-01 PROCEDURE — 3008F BODY MASS INDEX DOCD: CPT | Mod: CPTII,S$GLB,, | Performed by: NURSE PRACTITIONER

## 2025-07-01 PROCEDURE — G2211 COMPLEX E/M VISIT ADD ON: HCPCS | Mod: S$GLB,,, | Performed by: NURSE PRACTITIONER

## 2025-07-01 PROCEDURE — 1159F MED LIST DOCD IN RCRD: CPT | Mod: CPTII,S$GLB,, | Performed by: NURSE PRACTITIONER

## 2025-07-01 PROCEDURE — 3078F DIAST BP <80 MM HG: CPT | Mod: CPTII,S$GLB,, | Performed by: NURSE PRACTITIONER

## 2025-07-01 PROCEDURE — 4010F ACE/ARB THERAPY RXD/TAKEN: CPT | Mod: CPTII,S$GLB,, | Performed by: NURSE PRACTITIONER

## 2025-07-01 PROCEDURE — 1160F RVW MEDS BY RX/DR IN RCRD: CPT | Mod: CPTII,S$GLB,, | Performed by: NURSE PRACTITIONER

## 2025-07-01 PROCEDURE — 1101F PT FALLS ASSESS-DOCD LE1/YR: CPT | Mod: CPTII,S$GLB,, | Performed by: NURSE PRACTITIONER

## 2025-07-01 PROCEDURE — 99214 OFFICE O/P EST MOD 30 MIN: CPT | Mod: S$GLB,,, | Performed by: NURSE PRACTITIONER

## 2025-07-01 PROCEDURE — 3044F HG A1C LEVEL LT 7.0%: CPT | Mod: CPTII,S$GLB,, | Performed by: NURSE PRACTITIONER

## 2025-07-01 PROCEDURE — 3074F SYST BP LT 130 MM HG: CPT | Mod: CPTII,S$GLB,, | Performed by: NURSE PRACTITIONER

## 2025-07-01 PROCEDURE — 3288F FALL RISK ASSESSMENT DOCD: CPT | Mod: CPTII,S$GLB,, | Performed by: NURSE PRACTITIONER

## 2025-07-01 RX ORDER — EMPAGLIFLOZIN 25 MG/1
25 TABLET, FILM COATED ORAL DAILY
Qty: 90 TABLET | Refills: 3 | Status: SHIPPED | OUTPATIENT
Start: 2025-07-01

## 2025-07-01 RX ORDER — TIRZEPATIDE 12.5 MG/.5ML
12.5 INJECTION, SOLUTION SUBCUTANEOUS
Qty: 6 ML | Refills: 3 | Status: SHIPPED | OUTPATIENT
Start: 2025-07-01

## 2025-07-01 NOTE — PROGRESS NOTES
CC: Mr. Rashel Barrera III arrives today for management of Type 2 DM and review of chronic medical conditions, as listed in the Visit Diagnosis section of this encounter.       HPI: Mr. Rashel Barrera III was diagnosed with Type 2 DM in ~2010. He was diagnosed based on lab work. Initial treatment consisted of metformin. + FH of DM in maternal GM and uncles. Denies hospitalizations due to DM.   He has CAD and recently with multiple stent placements. Follows with Dr. Ogden.     He participates in the digital diabetes program.    Patient was last seen by me in February. He resumed Mounjaro at the beginning of the year. He has increased the Mounjaro incrementally up to his current dose of 10 mg. He resumed Jardiance in May since cost had decreased.     Since starting Jardiance, he stopped the Toujeo, due to improved control.     BG readings are checked 1x/day, usually postprandial.           Hypoglycemia: No    Missing Insulin/PO medication doses: Rare.    Exercise: goes to gym a few days/week     Dietary Habits: Eats 3 meals/day.  Rare snacking. Avoids sugary beverages.     Last DM education appointment: 9/2020        CURRENT DIABETIC MEDS: metformin 1000 mg BID, glimepiride 4 mg BID, Jardiance 10 mg daily, Mounjaro 10 mg weekly  Vial or pen: n/a  Glucometer type: Darlyn    Previous DM treatments:  Glyburide   Ozempic - expensive  Tresiba   Jardiance   Toujeo Max (dose was 58 units) - stopped since starting Jardiance.    Last Eye Exam: 5/2025, no DR   Last Podiatry Exam: n/a    REVIEW OF SYSTEMS  Constitutional: no c/o fatigue, weakness. + 11# weight loss since resuming Mounjaro.    Cardiac: no palpitations or chest pain.  Respiratory: no cough. Reports stable, chronic dyspnea on exertion  GI: no c/o abdominal pain or nausea. Denies h/o pancreatitis.    Skin: no lesions or rashes.  Neuro: no c/o numbness, tingling, paresthesias.   Endocrine: denies polyphagia, polydipsia, polyuria. Denies personal or family h/o  "MTC, MEN2.      Personally reviewed Past Medical, Surgical, Social History.    Vital Signs  /60   Pulse 67   Ht 5' 9" (1.753 m)   Wt 121.1 kg (266 lb 13.9 oz)   BMI 39.41 kg/m²      Personally reviewed the below labs:    Hemoglobin A1C   Date Value Ref Range Status   02/06/2025 8.6 (H) 4.0 - 5.6 % Final     Comment:     ADA Screening Guidelines:  5.7-6.4%  Consistent with prediabetes  >or=6.5%  Consistent with diabetes    High levels of fetal hemoglobin interfere with the HbA1C  assay. Heterozygous hemoglobin variants (HbS, HgC, etc)do  not significantly interfere with this assay.   However, presence of multiple variants may affect accuracy.     09/05/2024 6.4 (H) 4.0 - 5.6 % Final     Comment:     ADA Screening Guidelines:  5.7-6.4%  Consistent with prediabetes  >or=6.5%  Consistent with diabetes    High levels of fetal hemoglobin interfere with the HbA1C  assay. Heterozygous hemoglobin variants (HbS, HgC, etc)do  not significantly interfere with this assay.   However, presence of multiple variants may affect accuracy.     03/21/2024 6.4 (H) 4.0 - 5.6 % Final     Comment:     ADA Screening Guidelines:  5.7-6.4%  Consistent with prediabetes  >or=6.5%  Consistent with diabetes    High levels of fetal hemoglobin interfere with the HbA1C  assay. Heterozygous hemoglobin variants (HbS, HgC, etc)do  not significantly interfere with this assay.   However, presence of multiple variants may affect accuracy.       Hemoglobin A1c   Date Value Ref Range Status   06/26/2025 6.2 (H) 4.0 - 5.6 % Final     Comment:     ADA Screening Guidelines:  5.7-6.4%  Consistent with prediabetes  >=6.5%  Consistent with diabetes    High levels of fetal hemoglobin interfere with the HbA1C  assay. Heterozygous hemoglobin variants (HbS, HgC, etc)do  not significantly interfere with this assay.   However, presence of multiple variants may affect accuracy.       Chemistry        Component Value Date/Time     (L) 06/26/2025 0856    NA " 136 12/30/2024 0503    K 4.2 06/26/2025 0856    K 3.7 12/30/2024 0503     06/26/2025 0856     12/30/2024 0503    CO2 20 (L) 06/26/2025 0856    CO2 24 12/30/2024 0503    BUN 18 06/26/2025 0856    CREATININE 1.1 06/26/2025 0856     (H) 06/26/2025 0856     (H) 12/30/2024 0503        Component Value Date/Time    CALCIUM 9.1 06/26/2025 0856    CALCIUM 8.8 12/30/2024 0503    ALKPHOS 53 06/26/2025 0856    ALKPHOS 116 12/27/2024 2242    AST 30 06/26/2025 0856    AST 34 12/27/2024 2242    ALT 38 06/26/2025 0856    ALT 32 12/27/2024 2242    BILITOT 0.5 06/26/2025 0856    BILITOT 0.3 12/27/2024 2242    ESTGFRAFRICA >60.0 06/16/2022 0923    EGFRNONAA >60.0 06/16/2022 0923          Lab Results   Component Value Date    CHOL 108 (L) 12/28/2024    CHOL 86 (L) 09/05/2024    CHOL 107 (L) 07/27/2023     Lab Results   Component Value Date    HDL 22 (L) 12/28/2024    HDL 26 (L) 09/05/2024    HDL 23 (L) 07/27/2023     Lab Results   Component Value Date    LDLCALC 20.4 (L) 12/28/2024    LDLCALC 16.8 (L) 09/05/2024    LDLCALC 23.4 (L) 07/27/2023     Lab Results   Component Value Date    TRIG 328 (H) 12/28/2024    TRIG 216 (H) 09/05/2024    TRIG 303 (H) 07/27/2023     Lab Results   Component Value Date    CHOLHDL 20.4 12/28/2024    CHOLHDL 30.2 09/05/2024    CHOLHDL 21.5 07/27/2023       Lab Results   Component Value Date    MICALBCREAT 4.9 09/05/2024     Lab Results   Component Value Date    TSH 1.403 02/06/2025       Estimated Creatinine Clearance: 86.1 mL/min (based on SCr of 1.1 mg/dL).    Vit D, 25-Hydroxy   Date Value Ref Range Status   09/15/2022 28 (L) 30 - 96 ng/mL Final     Comment:     Vitamin D deficiency.........<10 ng/mL                              Vitamin D insufficiency......10-29 ng/mL       Vitamin D sufficiency........> or equal to 30 ng/mL  Vitamin D toxicity............>100 ng/mL           PHYSICAL EXAMINATION  Constitutional: Appears well, no distress  Respiratory: CTA, even and  unlabored.  Cardiovascular: RRR, no murmurs, no carotid bruits.   GI: active bowel sounds, no hernia noted.  Skin: warm and dry; no visible wounds  Neuro: oriented to person, place, time  Feet: appropriate footwear.         Goals         80 <= Glucose <= 180 (pt-stated)       Blood Pressure < 130/80       Exercise at least 150 minutes per week.       HEMOGLOBIN A1C < 7       Take at least one BP reading per week at various times of the day                 Assessment/Plan  1. Type 2 diabetes mellitus with complication, with long-term current use of insulin  -- Improving. Will increase Jardiance and Mounjaro in an effort to remain off of insulin.  -- increase Jardiance to 25 mg daily  -- increase Mounjaro to 12.5 mg weekly  -- continue glimepiride, metformin  -- will hold off on resuming Toujeo Max until effects of Mounjaro and Jardiance dose increases are known.  -- check BG 2x/day. Notify me if fasting blood sugars are often > 150.   -- schedule eye exam    -- takes ARB, statin, aspirin   2. Coronary artery disease involving native coronary artery of native heart without angina pectoris  -- optimize DM control.  -- follows with cardiology   3. Hypertension  -- continue current meds.   -- Managed by cardiology   4. Hypercholesterolemia  -- uncontrolled with elevated TRG  -- optimize DM control. Reduce white carbs, added sugars.  -- taking Crestor, fenofibrate   -- follows with cardiology   5. Obesity  -- improving with use of Mounjaro  Body mass index is 39.41 kg/m².        FOLLOW UP  Follow up in about 3 months (around 10/1/2025).  Patient instructed to bring BG logs to each follow up   Patient encouraged to call for any BG/medication issues, concerns, or questions.    Orders Placed This Encounter   Procedures    Hemoglobin A1C    Comprehensive Metabolic Panel    Microalbumin/Creatinine Ratio, Urine

## 2025-07-07 ENCOUNTER — OFFICE VISIT (OUTPATIENT)
Dept: FAMILY MEDICINE | Facility: CLINIC | Age: 65
End: 2025-07-07
Payer: MEDICARE

## 2025-07-07 VITALS
DIASTOLIC BLOOD PRESSURE: 70 MMHG | SYSTOLIC BLOOD PRESSURE: 122 MMHG | HEART RATE: 74 BPM | WEIGHT: 266.75 LBS | RESPIRATION RATE: 20 BRPM | OXYGEN SATURATION: 95 % | BODY MASS INDEX: 39.51 KG/M2 | HEIGHT: 69 IN

## 2025-07-07 DIAGNOSIS — E78.2 MIXED HYPERLIPIDEMIA: ICD-10-CM

## 2025-07-07 DIAGNOSIS — I25.118 CORONARY ARTERY DISEASE INVOLVING NATIVE CORONARY ARTERY OF NATIVE HEART WITH OTHER FORM OF ANGINA PECTORIS: ICD-10-CM

## 2025-07-07 DIAGNOSIS — E66.812 CLASS 2 OBESITY: ICD-10-CM

## 2025-07-07 DIAGNOSIS — Z12.5 SCREENING FOR PROSTATE CANCER: ICD-10-CM

## 2025-07-07 DIAGNOSIS — I10 ESSENTIAL HYPERTENSION: Primary | ICD-10-CM

## 2025-07-07 DIAGNOSIS — E11.69 TYPE 2 DIABETES MELLITUS WITH OTHER SPECIFIED COMPLICATION, WITH LONG-TERM CURRENT USE OF INSULIN: ICD-10-CM

## 2025-07-07 DIAGNOSIS — Z79.4 TYPE 2 DIABETES MELLITUS WITH OTHER SPECIFIED COMPLICATION, WITH LONG-TERM CURRENT USE OF INSULIN: ICD-10-CM

## 2025-07-07 PROBLEM — E66.01 SEVERE OBESITY (BMI >= 40): Status: RESOLVED | Noted: 2017-08-09 | Resolved: 2025-07-07

## 2025-07-07 PROCEDURE — 4010F ACE/ARB THERAPY RXD/TAKEN: CPT | Mod: CPTII,S$GLB,, | Performed by: INTERNAL MEDICINE

## 2025-07-07 PROCEDURE — 99999 PR PBB SHADOW E&M-EST. PATIENT-LVL V: CPT | Mod: PBBFAC,,, | Performed by: INTERNAL MEDICINE

## 2025-07-07 PROCEDURE — 1160F RVW MEDS BY RX/DR IN RCRD: CPT | Mod: CPTII,S$GLB,, | Performed by: INTERNAL MEDICINE

## 2025-07-07 PROCEDURE — 1159F MED LIST DOCD IN RCRD: CPT | Mod: CPTII,S$GLB,, | Performed by: INTERNAL MEDICINE

## 2025-07-07 PROCEDURE — 3008F BODY MASS INDEX DOCD: CPT | Mod: CPTII,S$GLB,, | Performed by: INTERNAL MEDICINE

## 2025-07-07 PROCEDURE — G2211 COMPLEX E/M VISIT ADD ON: HCPCS | Mod: S$GLB,,, | Performed by: INTERNAL MEDICINE

## 2025-07-07 PROCEDURE — 3074F SYST BP LT 130 MM HG: CPT | Mod: CPTII,S$GLB,, | Performed by: INTERNAL MEDICINE

## 2025-07-07 PROCEDURE — 3288F FALL RISK ASSESSMENT DOCD: CPT | Mod: CPTII,S$GLB,, | Performed by: INTERNAL MEDICINE

## 2025-07-07 PROCEDURE — 99214 OFFICE O/P EST MOD 30 MIN: CPT | Mod: S$GLB,,, | Performed by: INTERNAL MEDICINE

## 2025-07-07 PROCEDURE — 1101F PT FALLS ASSESS-DOCD LE1/YR: CPT | Mod: CPTII,S$GLB,, | Performed by: INTERNAL MEDICINE

## 2025-07-07 PROCEDURE — 3078F DIAST BP <80 MM HG: CPT | Mod: CPTII,S$GLB,, | Performed by: INTERNAL MEDICINE

## 2025-07-07 PROCEDURE — 3044F HG A1C LEVEL LT 7.0%: CPT | Mod: CPTII,S$GLB,, | Performed by: INTERNAL MEDICINE

## 2025-07-07 NOTE — PROGRESS NOTES
Patient ID: Rashel Barrera III is a 65 y.o. male.    Chief Complaint: Hypertension and Diabetes (Coronary artery disease)       Assessment and plan:   Essential hypertension  -     CBC Auto Differential; Future; Expected date: 07/07/2025    Class 2 obesity    Type 2 diabetes mellitus with other specified complication, with long-term current use of insulin    Mixed hyperlipidemia  -     Lipid Panel; Future; Expected date: 07/07/2025    Coronary artery disease involving native coronary artery of native heart with other form of angina pectoris    Screening for prostate cancer  -     PSA, Screening; Future; Expected date: 11/06/2025       Continue current medications listed below     HPI:     Assessment & Plan  Essential hypertension  Controlled on   Hypertension Medications              amLODIPine (NORVASC) 10 MG tablet TAKE 1 TABLET DAILY    hydrALAZINE (APRESOLINE) 100 MG tablet Take 1 tablet (100 mg total) by mouth 2 times daily.    nebivoloL (BYSTOLIC) 10 MG Tab TAKE 1 TABLET DAILY    spironolactone (ALDACTONE) 25 MG tablet TAKE 1 TABLET DAILY    valsartan (DIOVAN) 320 MG tablet TAKE 1 TABLET DAILY           Class 2 obesity  Lost 9 lbs   Type 2 diabetes mellitus with other specified complication, with long-term current use of insulin  Controlled. A1c went from 8.6 to 6.2. endo started him on jardiance. He is currently taking   glimepiride (AMARYL) 4 MG tablet TAKE 1 TABLET TWICE A DAY WITH MEALS   JARDIANCE 25 mg tablet Take 1 tablet (25 mg total) by mouth once daily.   metFORMIN (GLUCOPHAGE) 1000 MG tablet TAKE 1 TABLET TWICE A DAY WITH MEALS   MOUNJARO 12.5 mg/0.5 mL PnIj Inject 12.5 mg into the skin every 7 days.     Mixed hyperlipidemia  LDL 20,   Taking  Hyperlipidemia Medications              fenofibrate (TRICOR) 145 MG tablet TAKE 1 TABLET DAILY    rosuvastatin (CRESTOR) 5 MG tablet Take 1 tablet (5 mg total) by mouth every evening.             Coronary artery disease involving native coronary  artery of native heart with other form of angina pectoris  Taking ranexa, plavix, and asa 81 mg        Review of Systems   Respiratory:  Negative for shortness of breath.    Cardiovascular:  Negative for chest pain.   Gastrointestinal:  Negative for abdominal pain.      Objective   Vitals:    07/07/25 1448   BP: 122/70   Pulse: 74   Resp: 20        Wt Readings from Last 3 Encounters:   07/07/25 1448 121 kg (266 lb 12.1 oz)   07/01/25 1030 121.1 kg (266 lb 13.9 oz)   04/28/25 1522 125 kg (275 lb 9.2 oz)        Body mass index is 39.39 kg/m².     Physical Exam  Cardiovascular:      Rate and Rhythm: Normal rate and regular rhythm.   Pulmonary:      Breath sounds: Normal breath sounds. No wheezing.        Reference   Diabetes Medications              glimepiride (AMARYL) 4 MG tablet TAKE 1 TABLET TWICE A DAY WITH MEALS    insulin glargine U-300 conc (TOUJEO MAX U-300 SOLOSTAR) 300 unit/mL (3 mL) insulin pen Inject 68 Units into the skin every evening.    JARDIANCE 25 mg tablet Take 1 tablet (25 mg total) by mouth once daily.    metFORMIN (GLUCOPHAGE) 1000 MG tablet TAKE 1 TABLET TWICE A DAY WITH MEALS    MOUNJARO 12.5 mg/0.5 mL PnIj Inject 12.5 mg into the skin every 7 days.           Hypertension Medications              amLODIPine (NORVASC) 10 MG tablet TAKE 1 TABLET DAILY    hydrALAZINE (APRESOLINE) 100 MG tablet Take 1 tablet (100 mg total) by mouth 3 (three) times daily.    nebivoloL (BYSTOLIC) 10 MG Tab TAKE 1 TABLET DAILY    spironolactone (ALDACTONE) 25 MG tablet TAKE 1 TABLET DAILY    valsartan (DIOVAN) 320 MG tablet TAKE 1 TABLET DAILY           Hyperlipidemia Medications              fenofibrate (TRICOR) 145 MG tablet TAKE 1 TABLET DAILY    rosuvastatin (CRESTOR) 5 MG tablet Take 1 tablet (5 mg total) by mouth every evening.             Medication List with Changes/Refills   Current Medications    ACETAMINOPHEN (TYLENOL) 325 MG TABLET    Take 2 tablets (650 mg total) by mouth every 4 (four) hours as needed  "for Temperature greater than or Pain.    AMLODIPINE (NORVASC) 10 MG TABLET    TAKE 1 TABLET DAILY    ASPIRIN (ECOTRIN) 81 MG EC TABLET    Take 81 mg by mouth every evening.    CLOPIDOGREL (PLAVIX) 75 MG TABLET    Take 1 tablet (75 mg total) by mouth once daily.    FENOFIBRATE (TRICOR) 145 MG TABLET    TAKE 1 TABLET DAILY    GLIMEPIRIDE (AMARYL) 4 MG TABLET    TAKE 1 TABLET TWICE A DAY WITH MEALS    HYDRALAZINE (APRESOLINE) 100 MG TABLET    Take 1 tablet (100 mg total) by mouth 3 (three) times daily.    INSULIN GLARGINE U-300 CONC (TOUJEO MAX U-300 SOLOSTAR) 300 UNIT/ML (3 ML) INSULIN PEN    Inject 68 Units into the skin every evening.    JARDIANCE 25 MG TABLET    Take 1 tablet (25 mg total) by mouth once daily.    METFORMIN (GLUCOPHAGE) 1000 MG TABLET    TAKE 1 TABLET TWICE A DAY WITH MEALS    MOUNJARO 12.5 MG/0.5 ML PNIJ    Inject 12.5 mg into the skin every 7 days.    NEBIVOLOL (BYSTOLIC) 10 MG TAB    TAKE 1 TABLET DAILY    PANTOPRAZOLE (PROTONIX) 40 MG TABLET    Take 40 mg by mouth 2 (two) times daily.    PEN NEEDLE, DIABETIC (BD ULTRA-FINE MICRO PEN NEEDLE) 32 GAUGE X 1/4" NDLE    Use one daily with insulin    RANOLAZINE (RANEXA) 500 MG TB12    TAKE 1 TABLET TWICE A DAY    ROSUVASTATIN (CRESTOR) 5 MG TABLET    Take 1 tablet (5 mg total) by mouth every evening.    SILDENAFIL (VIAGRA) 50 MG TABLET    Take 1 tablet (50 mg total) by mouth daily as needed for Erectile Dysfunction.    SPIRONOLACTONE (ALDACTONE) 25 MG TABLET    TAKE 1 TABLET DAILY    VALSARTAN (DIOVAN) 320 MG TABLET    TAKE 1 TABLET DAILY      : Visit today included increased complexity associated with the care of the episodic problem Essential hypertension [I10] addressed and managing the longitudinal care of the patient due to the serious and/or complex managed problem(s)    Active Problem List with Overview Notes    Diagnosis Date Noted    Thoracic aorta atherosclerosis 09/21/2022     Seen on CTA chest noncoronary 03/12/2020      Obstructive " sleep apnea 10/17/2019    Coronary artery disease involving native coronary artery of native heart with other form of angina pectoris 09/16/2019     Five stents  Stable, continue aspirin, beta-blocker, statin  Continue follow-up with Cardiology.      Chronic dyspnea 08/19/2019     Seeing Dr. Ogden and metoprolol discontinued and he was put on ranexa and bistolic. Dyspnea has improved.       Mixed hyperlipidemia 08/19/2019     LDL at goal but triglycerides elevated.  Continue fenofibrate and Crestor      Gastroesophageal reflux disease without esophagitis 08/15/2017    Essential hypertension 08/09/2017     Controlled, continue regimen listed.      Type 2 diabetes mellitus with other specified complication, with long-term current use of insulin 08/09/2017     Controlled.  Following with endo.  Has virtual visit today.  May discontinue Jardiance to help with cost of medications.        Class 2 obesity 07/07/2025    S/P right coronary artery (RCA) stent placement 01/14/2025    Hypomagnesemia 12/28/2024    Tremor of right hand 04/02/2024     Onset 2023. Family history of parkinson disease. hand tremor occurs when he does not feel well. Resting and with activity.       History of deep vein thrombosis (DVT) of lower extremity 10/17/2019    Stented coronary artery 10/07/2019    Long term (current) use of antithrombotics/antiplatelets 09/16/2019    Venous insufficiency 08/09/2017

## 2025-08-01 DIAGNOSIS — E11.8 TYPE 2 DIABETES MELLITUS WITH COMPLICATION, WITHOUT LONG-TERM CURRENT USE OF INSULIN: ICD-10-CM

## 2025-08-04 RX ORDER — GLIMEPIRIDE 4 MG/1
4 TABLET ORAL 2 TIMES DAILY WITH MEALS
Qty: 180 TABLET | Refills: 3 | Status: SHIPPED | OUTPATIENT
Start: 2025-08-04